# Patient Record
Sex: MALE | Race: WHITE | NOT HISPANIC OR LATINO | URBAN - METROPOLITAN AREA
[De-identification: names, ages, dates, MRNs, and addresses within clinical notes are randomized per-mention and may not be internally consistent; named-entity substitution may affect disease eponyms.]

---

## 2019-07-02 ENCOUNTER — OUTPATIENT (OUTPATIENT)
Dept: OUTPATIENT SERVICES | Facility: HOSPITAL | Age: 54
LOS: 1 days | Discharge: HOME | End: 2019-07-02

## 2019-07-02 DIAGNOSIS — E11.9 TYPE 2 DIABETES MELLITUS WITHOUT COMPLICATIONS: ICD-10-CM

## 2019-07-02 DIAGNOSIS — D64.9 ANEMIA, UNSPECIFIED: ICD-10-CM

## 2019-07-02 DIAGNOSIS — E78.00 PURE HYPERCHOLESTEROLEMIA, UNSPECIFIED: ICD-10-CM

## 2019-07-02 DIAGNOSIS — E03.9 HYPOTHYROIDISM, UNSPECIFIED: ICD-10-CM

## 2019-09-07 ENCOUNTER — EMERGENCY (EMERGENCY)
Facility: HOSPITAL | Age: 54
LOS: 0 days | Discharge: HOME | End: 2019-09-08
Attending: EMERGENCY MEDICINE | Admitting: EMERGENCY MEDICINE
Payer: MEDICARE

## 2019-09-07 VITALS
SYSTOLIC BLOOD PRESSURE: 138 MMHG | DIASTOLIC BLOOD PRESSURE: 81 MMHG | RESPIRATION RATE: 20 BRPM | OXYGEN SATURATION: 96 % | TEMPERATURE: 98 F | HEART RATE: 87 BPM

## 2019-09-07 DIAGNOSIS — M25.551 PAIN IN RIGHT HIP: ICD-10-CM
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
## 2019-09-07 DIAGNOSIS — K82.9 DISEASE OF GALLBLADDER, UNSPECIFIED: Chronic | ICD-10-CM

## 2019-09-07 DIAGNOSIS — Z79.899 OTHER LONG TERM (CURRENT) DRUG THERAPY: ICD-10-CM
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
## 2019-09-07 DIAGNOSIS — J36 PERITONSILLAR ABSCESS: Chronic | ICD-10-CM
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
## 2019-09-07 DIAGNOSIS — M25.511 PAIN IN RIGHT SHOULDER: ICD-10-CM

## 2019-09-07 DIAGNOSIS — M54.9 DORSALGIA, UNSPECIFIED: ICD-10-CM

## 2019-09-07 DIAGNOSIS — M25.552 PAIN IN LEFT HIP: ICD-10-CM

## 2019-09-07 PROCEDURE — 99284 EMERGENCY DEPT VISIT MOD MDM: CPT

## 2019-09-07 RX ORDER — MORPHINE SULFATE 50 MG/1
6 CAPSULE, EXTENDED RELEASE ORAL ONCE
Refills: 0 | Status: DISCONTINUED | OUTPATIENT
Start: 2019-09-07 | End: 2019-09-07

## 2019-09-07 RX ORDER — ROSUVASTATIN CALCIUM 5 MG/1
150 TABLET ORAL
Qty: 0 | Refills: 0 | DISCHARGE

## 2019-09-07 NOTE — ED ADULT TRIAGE NOTE - CHIEF COMPLAINT QUOTE
pt BIBA s/p fall off chair, c/o back pain, unable to ambulate. + head trauma, denies LOC, no abrasion noted. Pt has multiple hx of back surgeries and uses morphine pump for pain. Pt denies numbness or tingling.

## 2019-09-07 NOTE — ED ADULT NURSE NOTE - PMH
Hypertension, unspecified type    Hypogammaglobulinemia    Moderate mixed hyperlipidemia not requiring statin therapy    Spinal stenosis, unspecified spinal region    ST elevation myocardial infarction (STEMI), unspecified artery

## 2019-09-08 VITALS
TEMPERATURE: 98 F | DIASTOLIC BLOOD PRESSURE: 53 MMHG | RESPIRATION RATE: 20 BRPM | HEART RATE: 69 BPM | SYSTOLIC BLOOD PRESSURE: 110 MMHG

## 2019-09-08 PROCEDURE — 71045 X-RAY EXAM CHEST 1 VIEW: CPT | Mod: 26

## 2019-09-08 PROCEDURE — 73030 X-RAY EXAM OF SHOULDER: CPT | Mod: 26,RT

## 2019-09-08 PROCEDURE — 70450 CT HEAD/BRAIN W/O DYE: CPT | Mod: 26

## 2019-09-08 PROCEDURE — 73502 X-RAY EXAM HIP UNI 2-3 VIEWS: CPT | Mod: 26,RT

## 2019-09-08 PROCEDURE — 72170 X-RAY EXAM OF PELVIS: CPT | Mod: 26,59

## 2019-09-08 RX ORDER — MORPHINE SULFATE 50 MG/1
6 CAPSULE, EXTENDED RELEASE ORAL ONCE
Refills: 0 | Status: DISCONTINUED | OUTPATIENT
Start: 2019-09-08 | End: 2019-09-08

## 2019-09-08 RX ADMIN — MORPHINE SULFATE 6 MILLIGRAM(S): 50 CAPSULE, EXTENDED RELEASE ORAL at 01:53

## 2019-09-08 RX ADMIN — MORPHINE SULFATE 6 MILLIGRAM(S): 50 CAPSULE, EXTENDED RELEASE ORAL at 00:56

## 2019-09-08 RX ADMIN — MORPHINE SULFATE 6 MILLIGRAM(S): 50 CAPSULE, EXTENDED RELEASE ORAL at 02:30

## 2019-09-08 RX ADMIN — MORPHINE SULFATE 6 MILLIGRAM(S): 50 CAPSULE, EXTENDED RELEASE ORAL at 02:09

## 2019-09-08 NOTE — ED PROVIDER NOTE - OBJECTIVE STATEMENT
fall from seated height, fell on to right side, then chair fell on to his left side, he has morphine pump for chronic back pain his pain is right shoulder and bilateral hips. no loc, he did hit head.

## 2019-09-08 NOTE — ED PROVIDER NOTE - PATIENT PORTAL LINK FT
You can access the FollowMyHealth Patient Portal offered by [facility] by registering at the following website: [email or web address]. By joining [employer]’s FollowMyHealth portal, you will also be able to view your health information using other applications (apps) compatible with our system.

## 2019-09-08 NOTE — ED PROVIDER NOTE - NS ED ROS FT
Constitutional:  no fevers, no chills, no malaise  Eyes:  No visual changes  ENMT: No neck pain or stiffness, no nasal congestion, no ear pain, no throat pain  Cardiac:  No chest pain  Respiratory:  No cough or sob  GI:  No nausea, vomiting, diarrhea or abdominal pain.  :  No dysuria, frequency or burning.  MS: + joint pain  Neuro:  No headache, no dizziness, no change in mental status  Skin:  No skin rash  Except as documented in the HPI,  all other systems are negative

## 2020-08-30 ENCOUNTER — INPATIENT (INPATIENT)
Facility: HOSPITAL | Age: 55
LOS: 2 days | Discharge: HOME | DRG: 41 | End: 2020-09-02
Attending: HOSPITALIST | Admitting: HOSPITALIST
Payer: MEDICARE

## 2020-08-30 VITALS
DIASTOLIC BLOOD PRESSURE: 76 MMHG | HEART RATE: 96 BPM | HEIGHT: 70 IN | SYSTOLIC BLOOD PRESSURE: 146 MMHG | OXYGEN SATURATION: 95 % | RESPIRATION RATE: 18 BRPM | WEIGHT: 240.08 LBS | TEMPERATURE: 99 F

## 2020-08-30 DIAGNOSIS — D64.9 ANEMIA, UNSPECIFIED: ICD-10-CM

## 2020-08-30 DIAGNOSIS — K82.9 DISEASE OF GALLBLADDER, UNSPECIFIED: Chronic | ICD-10-CM

## 2020-08-30 DIAGNOSIS — D80.1 NONFAMILIAL HYPOGAMMAGLOBULINEMIA: ICD-10-CM

## 2020-08-30 DIAGNOSIS — Z45.42 ENCOUNTER FOR ADJUSTMENT AND MANAGEMENT OF NEUROSTIMULATOR: ICD-10-CM

## 2020-08-30 DIAGNOSIS — E78.00 PURE HYPERCHOLESTEROLEMIA, UNSPECIFIED: ICD-10-CM

## 2020-08-30 DIAGNOSIS — E78.5 HYPERLIPIDEMIA, UNSPECIFIED: ICD-10-CM

## 2020-08-30 DIAGNOSIS — I25.10 ATHEROSCLEROTIC HEART DISEASE OF NATIVE CORONARY ARTERY WITHOUT ANGINA PECTORIS: ICD-10-CM

## 2020-08-30 DIAGNOSIS — Z98.1 ARTHRODESIS STATUS: Chronic | ICD-10-CM

## 2020-08-30 DIAGNOSIS — M54.5 LOW BACK PAIN: ICD-10-CM

## 2020-08-30 DIAGNOSIS — Z79.84 LONG TERM (CURRENT) USE OF ORAL HYPOGLYCEMIC DRUGS: ICD-10-CM

## 2020-08-30 DIAGNOSIS — J30.2 OTHER SEASONAL ALLERGIC RHINITIS: ICD-10-CM

## 2020-08-30 DIAGNOSIS — K21.9 GASTRO-ESOPHAGEAL REFLUX DISEASE WITHOUT ESOPHAGITIS: ICD-10-CM

## 2020-08-30 DIAGNOSIS — R07.9 CHEST PAIN, UNSPECIFIED: ICD-10-CM

## 2020-08-30 DIAGNOSIS — K50.90 CROHN'S DISEASE, UNSPECIFIED, WITHOUT COMPLICATIONS: ICD-10-CM
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
## 2020-08-30 DIAGNOSIS — E11.9 TYPE 2 DIABETES MELLITUS WITHOUT COMPLICATIONS: ICD-10-CM

## 2020-08-30 DIAGNOSIS — R10.9 UNSPECIFIED ABDOMINAL PAIN: ICD-10-CM

## 2020-08-30 DIAGNOSIS — I10 ESSENTIAL (PRIMARY) HYPERTENSION: ICD-10-CM

## 2020-08-30 DIAGNOSIS — C85.90 NON-HODGKIN LYMPHOMA, UNSPECIFIED, UNSPECIFIED SITE: ICD-10-CM

## 2020-08-30 DIAGNOSIS — I25.2 OLD MYOCARDIAL INFARCTION: ICD-10-CM

## 2020-08-30 DIAGNOSIS — J36 PERITONSILLAR ABSCESS: Chronic | ICD-10-CM

## 2020-08-30 DIAGNOSIS — M54.9 DORSALGIA, UNSPECIFIED: ICD-10-CM

## 2020-08-30 DIAGNOSIS — Z79.82 LONG TERM (CURRENT) USE OF ASPIRIN: ICD-10-CM

## 2020-08-30 DIAGNOSIS — M54.16 RADICULOPATHY, LUMBAR REGION: ICD-10-CM

## 2020-08-30 DIAGNOSIS — Z29.9 ENCOUNTER FOR PROPHYLACTIC MEASURES, UNSPECIFIED: ICD-10-CM

## 2020-08-30 DIAGNOSIS — G62.9 POLYNEUROPATHY, UNSPECIFIED: ICD-10-CM

## 2020-08-30 LAB
ALBUMIN SERPL ELPH-MCNC: 3.4 G/DL — SIGNIFICANT CHANGE UP (ref 3.3–5)
ALP SERPL-CCNC: 88 U/L — SIGNIFICANT CHANGE UP (ref 40–120)
ALT FLD-CCNC: 25 U/L — SIGNIFICANT CHANGE UP (ref 10–45)
ANION GAP SERPL CALC-SCNC: 12 MMOL/L — SIGNIFICANT CHANGE UP (ref 5–17)
APTT BLD: 31.2 SEC — SIGNIFICANT CHANGE UP (ref 27.5–35.5)
AST SERPL-CCNC: 53 U/L — HIGH (ref 10–40)
BASOPHILS # BLD AUTO: 0.06 K/UL — SIGNIFICANT CHANGE UP (ref 0–0.2)
BASOPHILS NFR BLD AUTO: 0.7 % — SIGNIFICANT CHANGE UP (ref 0–2)
BILIRUB SERPL-MCNC: 0.4 MG/DL — SIGNIFICANT CHANGE UP (ref 0.2–1.2)
BLD GP AB SCN SERPL QL: NEGATIVE — SIGNIFICANT CHANGE UP
BUN SERPL-MCNC: 9 MG/DL — SIGNIFICANT CHANGE UP (ref 7–23)
CALCIUM SERPL-MCNC: 9 MG/DL — SIGNIFICANT CHANGE UP (ref 8.4–10.5)
CHLORIDE SERPL-SCNC: 98 MMOL/L — SIGNIFICANT CHANGE UP (ref 96–108)
CO2 SERPL-SCNC: 29 MMOL/L — SIGNIFICANT CHANGE UP (ref 22–31)
CREAT SERPL-MCNC: 0.68 MG/DL — SIGNIFICANT CHANGE UP (ref 0.5–1.3)
EOSINOPHIL # BLD AUTO: 0.46 K/UL — SIGNIFICANT CHANGE UP (ref 0–0.5)
EOSINOPHIL NFR BLD AUTO: 5.1 % — SIGNIFICANT CHANGE UP (ref 0–6)
GLUCOSE BLDC GLUCOMTR-MCNC: 104 MG/DL — HIGH (ref 70–99)
GLUCOSE BLDC GLUCOMTR-MCNC: 123 MG/DL — HIGH (ref 70–99)
GLUCOSE BLDC GLUCOMTR-MCNC: 150 MG/DL — HIGH (ref 70–99)
GLUCOSE SERPL-MCNC: 122 MG/DL — HIGH (ref 70–99)
HCT VFR BLD CALC: 36.9 % — LOW (ref 39–50)
HGB BLD-MCNC: 12.1 G/DL — LOW (ref 13–17)
IMM GRANULOCYTES NFR BLD AUTO: 0.3 % — SIGNIFICANT CHANGE UP (ref 0–1.5)
INR BLD: 1.17 — HIGH (ref 0.88–1.16)
LYMPHOCYTES # BLD AUTO: 2.68 K/UL — SIGNIFICANT CHANGE UP (ref 1–3.3)
LYMPHOCYTES # BLD AUTO: 29.7 % — SIGNIFICANT CHANGE UP (ref 13–44)
MCHC RBC-ENTMCNC: 28.1 PG — SIGNIFICANT CHANGE UP (ref 27–34)
MCHC RBC-ENTMCNC: 32.8 GM/DL — SIGNIFICANT CHANGE UP (ref 32–36)
MCV RBC AUTO: 85.6 FL — SIGNIFICANT CHANGE UP (ref 80–100)
MONOCYTES # BLD AUTO: 0.73 K/UL — SIGNIFICANT CHANGE UP (ref 0–0.9)
MONOCYTES NFR BLD AUTO: 8.1 % — SIGNIFICANT CHANGE UP (ref 2–14)
NEUTROPHILS # BLD AUTO: 5.05 K/UL — SIGNIFICANT CHANGE UP (ref 1.8–7.4)
NEUTROPHILS NFR BLD AUTO: 56.1 % — SIGNIFICANT CHANGE UP (ref 43–77)
NRBC # BLD: 0 /100 WBCS — SIGNIFICANT CHANGE UP (ref 0–0)
PLATELET # BLD AUTO: 149 K/UL — LOW (ref 150–400)
POTASSIUM SERPL-MCNC: 3.6 MMOL/L — SIGNIFICANT CHANGE UP (ref 3.5–5.3)
POTASSIUM SERPL-SCNC: 3.6 MMOL/L — SIGNIFICANT CHANGE UP (ref 3.5–5.3)
PROT SERPL-MCNC: 6.4 G/DL — SIGNIFICANT CHANGE UP (ref 6–8.3)
PROTHROM AB SERPL-ACNC: 13.9 SEC — HIGH (ref 10.6–13.6)
RBC # BLD: 4.31 M/UL — SIGNIFICANT CHANGE UP (ref 4.2–5.8)
RBC # FLD: 13.6 % — SIGNIFICANT CHANGE UP (ref 10.3–14.5)
RH IG SCN BLD-IMP: NEGATIVE — SIGNIFICANT CHANGE UP
SARS-COV-2 RNA SPEC QL NAA+PROBE: SIGNIFICANT CHANGE UP
SODIUM SERPL-SCNC: 139 MMOL/L — SIGNIFICANT CHANGE UP (ref 135–145)
WBC # BLD: 9.01 K/UL — SIGNIFICANT CHANGE UP (ref 3.8–10.5)
WBC # FLD AUTO: 9.01 K/UL — SIGNIFICANT CHANGE UP (ref 3.8–10.5)

## 2020-08-30 PROCEDURE — 99285 EMERGENCY DEPT VISIT HI MDM: CPT

## 2020-08-30 PROCEDURE — 71045 X-RAY EXAM CHEST 1 VIEW: CPT | Mod: 26

## 2020-08-30 PROCEDURE — 93010 ELECTROCARDIOGRAM REPORT: CPT

## 2020-08-30 PROCEDURE — 99223 1ST HOSP IP/OBS HIGH 75: CPT | Mod: GC

## 2020-08-30 RX ORDER — HYDROMORPHONE HYDROCHLORIDE 2 MG/ML
2 INJECTION INTRAMUSCULAR; INTRAVENOUS; SUBCUTANEOUS ONCE
Refills: 0 | Status: DISCONTINUED | OUTPATIENT
Start: 2020-08-30 | End: 2020-08-30

## 2020-08-30 RX ORDER — DEXTROSE 50 % IN WATER 50 %
25 SYRINGE (ML) INTRAVENOUS ONCE
Refills: 0 | Status: DISCONTINUED | OUTPATIENT
Start: 2020-08-30 | End: 2020-09-01

## 2020-08-30 RX ORDER — DEXTROSE 50 % IN WATER 50 %
15 SYRINGE (ML) INTRAVENOUS ONCE
Refills: 0 | Status: DISCONTINUED | OUTPATIENT
Start: 2020-08-30 | End: 2020-09-01

## 2020-08-30 RX ORDER — ATORVASTATIN CALCIUM 80 MG/1
20 TABLET, FILM COATED ORAL AT BEDTIME
Refills: 0 | Status: DISCONTINUED | OUTPATIENT
Start: 2020-08-30 | End: 2020-09-01

## 2020-08-30 RX ORDER — PANTOPRAZOLE SODIUM 20 MG/1
40 TABLET, DELAYED RELEASE ORAL
Refills: 0 | Status: DISCONTINUED | OUTPATIENT
Start: 2020-08-30 | End: 2020-09-01

## 2020-08-30 RX ORDER — METOPROLOL TARTRATE 50 MG
50 TABLET ORAL
Refills: 0 | Status: DISCONTINUED | OUTPATIENT
Start: 2020-08-30 | End: 2020-09-01

## 2020-08-30 RX ORDER — SODIUM CHLORIDE 9 MG/ML
1000 INJECTION INTRAMUSCULAR; INTRAVENOUS; SUBCUTANEOUS
Refills: 0 | Status: DISCONTINUED | OUTPATIENT
Start: 2020-08-30 | End: 2020-08-31

## 2020-08-30 RX ORDER — HYDROMORPHONE HYDROCHLORIDE 2 MG/ML
4 INJECTION INTRAMUSCULAR; INTRAVENOUS; SUBCUTANEOUS EVERY 4 HOURS
Refills: 0 | Status: DISCONTINUED | OUTPATIENT
Start: 2020-08-30 | End: 2020-08-31

## 2020-08-30 RX ORDER — FENOFIBRATE,MICRONIZED 130 MG
145 CAPSULE ORAL DAILY
Refills: 0 | Status: DISCONTINUED | OUTPATIENT
Start: 2020-08-30 | End: 2020-09-01

## 2020-08-30 RX ORDER — LORATADINE 10 MG/1
10 TABLET ORAL DAILY
Refills: 0 | Status: DISCONTINUED | OUTPATIENT
Start: 2020-08-30 | End: 2020-09-01

## 2020-08-30 RX ORDER — DEXTROSE 50 % IN WATER 50 %
12.5 SYRINGE (ML) INTRAVENOUS ONCE
Refills: 0 | Status: DISCONTINUED | OUTPATIENT
Start: 2020-08-30 | End: 2020-09-01

## 2020-08-30 RX ORDER — CYCLOBENZAPRINE HYDROCHLORIDE 10 MG/1
10 TABLET, FILM COATED ORAL THREE TIMES A DAY
Refills: 0 | Status: DISCONTINUED | OUTPATIENT
Start: 2020-08-30 | End: 2020-09-01

## 2020-08-30 RX ORDER — POLYETHYLENE GLYCOL 3350 17 G/17G
17 POWDER, FOR SOLUTION ORAL ONCE
Refills: 0 | Status: COMPLETED | OUTPATIENT
Start: 2020-08-30 | End: 2020-08-30

## 2020-08-30 RX ORDER — HYDROMORPHONE HYDROCHLORIDE 2 MG/ML
4 INJECTION INTRAMUSCULAR; INTRAVENOUS; SUBCUTANEOUS EVERY 4 HOURS
Refills: 0 | Status: DISCONTINUED | OUTPATIENT
Start: 2020-08-30 | End: 2020-08-30

## 2020-08-30 RX ORDER — GLUCAGON INJECTION, SOLUTION 0.5 MG/.1ML
1 INJECTION, SOLUTION SUBCUTANEOUS ONCE
Refills: 0 | Status: DISCONTINUED | OUTPATIENT
Start: 2020-08-30 | End: 2020-09-01

## 2020-08-30 RX ORDER — ASPIRIN/CALCIUM CARB/MAGNESIUM 324 MG
81 TABLET ORAL DAILY
Refills: 0 | Status: DISCONTINUED | OUTPATIENT
Start: 2020-08-30 | End: 2020-09-01

## 2020-08-30 RX ORDER — SODIUM CHLORIDE 9 MG/ML
1000 INJECTION, SOLUTION INTRAVENOUS
Refills: 0 | Status: DISCONTINUED | OUTPATIENT
Start: 2020-08-30 | End: 2020-09-01

## 2020-08-30 RX ORDER — GABAPENTIN 400 MG/1
800 CAPSULE ORAL DAILY
Refills: 0 | Status: DISCONTINUED | OUTPATIENT
Start: 2020-08-30 | End: 2020-08-31

## 2020-08-30 RX ORDER — POTASSIUM CHLORIDE 20 MEQ
40 PACKET (EA) ORAL ONCE
Refills: 0 | Status: COMPLETED | OUTPATIENT
Start: 2020-08-30 | End: 2020-08-30

## 2020-08-30 RX ORDER — INSULIN LISPRO 100/ML
VIAL (ML) SUBCUTANEOUS
Refills: 0 | Status: DISCONTINUED | OUTPATIENT
Start: 2020-08-30 | End: 2020-09-01

## 2020-08-30 RX ADMIN — CYCLOBENZAPRINE HYDROCHLORIDE 10 MILLIGRAM(S): 10 TABLET, FILM COATED ORAL at 20:33

## 2020-08-30 RX ADMIN — HYDROMORPHONE HYDROCHLORIDE 4 MILLIGRAM(S): 2 INJECTION INTRAMUSCULAR; INTRAVENOUS; SUBCUTANEOUS at 14:45

## 2020-08-30 RX ADMIN — GABAPENTIN 800 MILLIGRAM(S): 400 CAPSULE ORAL at 17:04

## 2020-08-30 RX ADMIN — HYDROMORPHONE HYDROCHLORIDE 2 MILLIGRAM(S): 2 INJECTION INTRAMUSCULAR; INTRAVENOUS; SUBCUTANEOUS at 09:39

## 2020-08-30 RX ADMIN — HYDROMORPHONE HYDROCHLORIDE 4 MILLIGRAM(S): 2 INJECTION INTRAMUSCULAR; INTRAVENOUS; SUBCUTANEOUS at 22:05

## 2020-08-30 RX ADMIN — HYDROMORPHONE HYDROCHLORIDE 4 MILLIGRAM(S): 2 INJECTION INTRAMUSCULAR; INTRAVENOUS; SUBCUTANEOUS at 18:05

## 2020-08-30 RX ADMIN — HYDROMORPHONE HYDROCHLORIDE 4 MILLIGRAM(S): 2 INJECTION INTRAMUSCULAR; INTRAVENOUS; SUBCUTANEOUS at 13:48

## 2020-08-30 RX ADMIN — HYDROMORPHONE HYDROCHLORIDE 4 MILLIGRAM(S): 2 INJECTION INTRAMUSCULAR; INTRAVENOUS; SUBCUTANEOUS at 19:05

## 2020-08-30 RX ADMIN — Medication 40 MILLIEQUIVALENT(S): at 12:11

## 2020-08-30 RX ADMIN — ATORVASTATIN CALCIUM 20 MILLIGRAM(S): 80 TABLET, FILM COATED ORAL at 21:46

## 2020-08-30 RX ADMIN — POLYETHYLENE GLYCOL 3350 17 GRAM(S): 17 POWDER, FOR SOLUTION ORAL at 19:34

## 2020-08-30 RX ADMIN — Medication 50 MILLIGRAM(S): at 17:04

## 2020-08-30 RX ADMIN — HYDROMORPHONE HYDROCHLORIDE 2 MILLIGRAM(S): 2 INJECTION INTRAMUSCULAR; INTRAVENOUS; SUBCUTANEOUS at 08:57

## 2020-08-30 NOTE — PROGRESS NOTE ADULT - SUBJECTIVE AND OBJECTIVE BOX
History of Present Illness:  History of Present Illness: 	  55 y.o. M PMHx HTN, HLD, DM, MI (2012), GERD, Crohn’s, non Hodgkins lymphoma, hypogammaglobulinemia , chronic back pain s/p nerve stimulator and pain pump with morphine presented to ED with worsening back pain 2/2 malfunctioning nerve stimulator. Patient realized stimulator stopped working night prior to admission and has been having break through pain. Pt also has pain pump with morphine that is still working. Called his pain management doctor (Dr. Gerardo [phone]) and was instructed to present to ED for evaluation.  Pain is in the mid to low back area and radiates to bilateral hips. Does not travel down his legs. Also has chronic numbness to bilateral lower extremity from neuropathy. History of cervical spine surgery and herniation in lumbar and thoracic spine. Denies new lower ext weakness or numbness. Uses cane at baseline. Denies any recent changes to bowel or bladder movements.  Endorses constipation, has not had BM since 5 days prior to admission but says he has chronic constipation. Endorses b/l LE edema. Also has diffuse abdominal pain 2/2 crohn's disease. However patient has not received treatment for Crohn's. Denies any headaches, fever, chills, n/v/d, dysuria.    Labs notable for Hgb/Hct 12.1/36/9, plt 149, PT 13.9, INR 1.17, AST 53  EKG: NSR    CXR clear            Review of Systems:  Review of Systems: As per HPI	      Allergies and Intolerances:        Allergies:  	No Known Allergies:     Home Medications:   * Patient Currently Takes Medications as of 30-Aug-2020 14:51 documented in Structured Notes  · 	Mucinex 600 mg oral tablet, extended release: Last Dose Taken:  , 1 tab(s) orally every 12 hours, As Needed  · 	gabapentin 800 mg oral tablet: Last Dose Taken:  , 1 tab(s) orally once a day  · 	metFORMIN 500 mg oral tablet: Last Dose Taken:  , 1 tab(s) orally 2 times a day  · 	metoprolol tartrate 50 mg oral tablet: Last Dose Taken:  , 1 tab(s) orally 2 times a day  · 	cyclobenzaprine 10 mg oral tablet: Last Dose Taken:  , 1 tab(s) orally 3 times a day  · 	esomeprazole 40 mg oral delayed release capsule: Last Dose Taken:  , 1 cap(s) orally once a day  · 	fenofibrate 134 mg oral capsule: Last Dose Taken:  , 1 cap(s) orally once a day  · 	Aspir 81 oral delayed release tablet: Last Dose Taken:  , 1 tab(s) orally once a day  · 	ZyrTEC 10 mg oral tablet: Last Dose Taken:  , 1 tab(s) orally once a day  · 	rosuvastatin 5 mg oral tablet: Last Dose Taken:  , 1 tab(s) orally once a day    Patient History:    Past Medical, Past Surgical, and Family History:  PAST MEDICAL HISTORY:  Chronic back pain     Crohn's disease     Diabetes     GERD (gastroesophageal reflux disease)     HTN (hypertension)     Hypercholesteremia     Myocardial infarct 2012    Seasonal allergies.     PAST SURGICAL HISTORY:  S/P cervical spinal fusion.     Social History:  Social History (marital status, living situation, occupation, tobacco use, alcohol and drug use, and sexual history): On disability due to back conditions. Former  for the [employer].	     Tobacco Screening:  · Core Measure Site	No	      Physical Exam:   Physical Exam: PHYSICAL EXAM:  GENERAL: NAD, speaks in full sentences, no signs of respiratory distress sitting in the bed, complaining of pain with discomfort in any position  HEAD:  Atraumatic, Normocephalic  EYES: EOMI, PERRLA, conjunctiva and sclera clear  NECK: Supple, No JVD  CHEST/LUNG: Clear to auscultation bilaterally; No wheeze; No crackles; No accessory muscles used  HEART: Regular rate and rhythm; No murmurs;   ABDOMEN: Soft, diffusely tender to palpation, distended due to body habitus; Bowel sounds present; No guarding  EXTREMITIES:  2+ Peripheral Pulses, 1+ edema; No cyanosis  PSYCH: AAOx3  NEUROLOGY: non-focal SKIN: No rashes or lesions  Labs reviwed as per H and P 	         Assessment:  · Assessment		  55 y.o. M PMHx HTN, HLD, DM, MI (2012), GERD, Crohn’s, non Hodgkins lymphoma, hypogammaglobulinemia , chronic back pain s/p nerve stimulator and pain pump with morphine presented to ED with worsening back pain 2/2 malfunctioning nerve stimulator.  DCS surgery for tomorrow afternoon  NPO after 6 AM  Patient may have clear liquids before 6 AM   IF fluids for tomorrow  AM   pain not controlled with oral meds   starts   Hydromorphone 4mg IV q4 prn for pain >7   continue other care   patient cleared for surgery  contact nurse in floor to call intern to place orders  any questions  please call or text   [phone]/ Ledy Aguillon)  (Signed 30-Aug-2020 14:51)  	Authored: History and Physical, History of Present Illness, Allergies/Medications, Patient History, Risk Assessment, Physical Exam, Labs and Results, Assessment and Plan

## 2020-08-30 NOTE — ED ADULT NURSE NOTE - OBJECTIVE STATEMENT
Pt states "I have a back nerve stimulator and a morphine pump, the back stimulator stopped working around 6 and since then the pain has not been tolerable and my pain management doctor told me to come to ER".

## 2020-08-30 NOTE — H&P ADULT - HISTORY OF PRESENT ILLNESS
55 y.o. PMHx HTN, HLD, DM, MI (2012), Crohn’s, chronic back pain s/p nerve stimulator and pain pump with morphine presents to ED secondary to nerve stimulator not working since night prior to this admission.  Patient realized stimulator stopped working last night and has been having break through pain.  Called his pain mgmt doctor Dr. Gerardo [phone] and was instructed to present to ED for evaluation.  As per patient, he has chronic numbness to b/l lower ext from neuropathy. History of cervical spine surgery and herniation in lumbar and thoracic spine. Denies new lower ext weakness. Uses cane at baseline. Endorses constipation, has not had BM for 5 days prior to admission. Endorses chronic constipation. Pt also has pain pump with morphine that is still working.    Denies any headaches, fever, chills, n/v/d, abdominal pain, dysuria, LE edema.     In the ED  Vitals T 98.2-98.8, HR 96->82, /76-> 102/65, RR 18, O2 sat 95-98 on RA  Labs notable for Hgb/Hct 12.1/36/9, plt 149, PT 13.9, INR 1.17, AST 53    EKG     CXR clear  Given dilaudid 2mg at 9am. 55 y.o. PMHx HTN, HLD, DM, MI (2012), Crohn’s, chronic back pain s/p nerve stimulator and pain pump with morphine presents to ED secondary to nerve stimulator not working since night prior to this admission.  Patient realized stimulator stopped working last night and has been having break through pain.  Called his pain mgmt doctor Dr. Gerardo [phone] and was instructed to present to ED for evaluation.  As per patient, he has chronic numbness to b/l lower ext from neuropathy. History of cervical spine surgery and herniation in lumbar and thoracic spine. Denies new lower ext weakness. Uses cane at baseline. Endorses constipation, has not had BM for 5 days prior to admission. Endorses chronic constipation. Pt also has pain pump with morphine that is still working. LE edema.     Denies any headaches, fever, chills, n/v/d, abdominal pain, dysuria.    In the ED  Vitals T 98.2-98.8, HR 96->82, /76-> 102/65, RR 18, O2 sat 95-98 on RA  Labs notable for Hgb/Hct 12.1/36/9, plt 149, PT 13.9, INR 1.17, AST 53    EKG     CXR clear  Given dilaudid 2mg at 9am. 55 y.o. PMHx HTN, HLD, DM, MI (2012), Crohn’s, chronic back pain s/p nerve stimulator and pain pump with morphine presents to ED secondary to nerve stimulator not working since night prior to this admission.  Patient realized stimulator stopped working last night and has been having break through pain.  Called his pain mgmt doctor Dr. Gerardo [phone] and was instructed to present to ED for evaluation.  As per patient, he has chronic numbness to b/l lower ext from neuropathy. History of cervical spine surgery and herniation in lumbar and thoracic spine. Denies new lower ext weakness. Uses cane at baseline. Endorses constipation, has not had BM for 5 days prior to admission. Endorses chronic constipation. Pt also has pain pump with morphine that is still working. LE edema.     Denies any headaches, fever, chills, n/v/d, abdominal pain, dysuria.    In the ED  Vitals T 98.2-98.8, HR 96->82, /76-> 102/65, RR 18, O2 sat 95-98 on RA  Labs notable for Hgb/Hct 12.1/36/9, plt 149, PT 13.9, INR 1.17, AST 53  EKG: NSR    CXR clear    Given dilaudid 2mg at 9am. 55 y.o. M PMHx HTN, HLD, DM, MI (2012), GERD, Crohn’s, non Hodgkins lymphoma, hypogammaglobulinemia , chronic back pain s/p nerve stimulator and pain pump with morphine presented to ED with worsening back pain 2/2 malfunctioning nerve stimulator. Patient realized stimulator stopped working night prior to admission and has been having break through pain. Pt also has pain pump with morphine that is still working. Called his pain management doctor (Dr. Gerardo [phone]) and was instructed to present to ED for evaluation.  Pain is in the mid to low back area and radiates to bilateral hips. Does not travel down his legs. Also has chronic numbness to bilateral lower extremity from neuropathy. History of cervical spine surgery and herniation in lumbar and thoracic spine. Denies new lower ext weakness or numbness. Uses cane at baseline. Denies any recent changes to bowel or bladder movements.  Endorses constipation, has not had BM since 5 days prior to admission but says he has chronic constipation. Endorses b/l LE edema. Also has diffuse abdominal pain 2/2 crohn's disease. However patient has not received treatment for Crohn's. Denies any headaches, fever, chills, n/v/d, dysuria.    In the ED  Vitals T 98.2-98.8, HR 96->82, /76-> 102/65, RR 18, O2 sat 95-98 on RA  Labs notable for Hgb/Hct 12.1/36/9, plt 149, PT 13.9, INR 1.17, AST 53  EKG: NSR    CXR clear    Given dilaudid 2mg at 9am.

## 2020-08-30 NOTE — ED ADULT NURSE NOTE - PMH
Chronic back pain    Diabetes    GERD (gastroesophageal reflux disease)    HTN (hypertension)    Hypercholesteremia    Seasonal allergies

## 2020-08-30 NOTE — H&P ADULT - NSHPPHYSICALEXAM_GEN_ALL_CORE
PHYSICAL EXAM:  GENERAL: NAD, speaks in full sentences, no signs of respiratory distress  HEAD:  Atraumatic, Normocephalic  EYES: EOMI, PERRLA, conjunctiva and sclera clear  NECK: Supple, No JVD  CHEST/LUNG: Clear to auscultation bilaterally; No wheeze; No crackles; No accessory muscles used  HEART: Regular rate and rhythm; No murmurs;   ABDOMEN: Soft, diffusely tender to palpation, distended due to body habitus; Bowel sounds present; No guarding  EXTREMITIES:  2+ Peripheral Pulses, No cyanosis or edema  PSYCH: AAOx3  NEUROLOGY: non-focal  SKIN: No rashes or lesions PHYSICAL EXAM:  GENERAL: NAD, speaks in full sentences, no signs of respiratory distress  HEAD:  Atraumatic, Normocephalic  EYES: EOMI, PERRLA, conjunctiva and sclera clear  NECK: Supple, No JVD  CHEST/LUNG: Clear to auscultation bilaterally; No wheeze; No crackles; No accessory muscles used  HEART: Regular rate and rhythm; No murmurs;   ABDOMEN: Soft, diffusely tender to palpation, distended due to body habitus; Bowel sounds present; No guarding  EXTREMITIES:  2+ Peripheral Pulses, 1+ edema; No cyanosis  PSYCH: AAOx3  NEUROLOGY: non-focal  SKIN: No rashes or lesions

## 2020-08-30 NOTE — ED ADULT NURSE NOTE - CHIEF COMPLAINT QUOTE
pt arriving to ED for c/c "my back nerve stimulator and morphine pump are broken, my pain management  told me to come for evaluation." Pt endorses back pain, radiating to Lt arm and jaw. Trouble ambulating this am. Denies fever, chills, cough, CP or SOB.

## 2020-08-30 NOTE — ED PROVIDER NOTE - OBJECTIVE STATEMENT
[facility] [address]    [address]    [facility] [address]    [facility] [address]    Phone:  [phone]    Fax:  [phone]       Thank You for choosing us for your health care visit. We are glad to serve you and happy to provide you with this summary of your visit. Please help us to ensure we have accurate records. If you find anything that needs to be changed, please let our staff know as soon as possible.          Your Demographic Information     Patient Name Sex David Durbin 1938       Ethnic Group Patient Race    Not of  or  Origin White      Your Visit Details     Date & Time Provider Department    2017 2:45 PM Mervin Lema III, MD [facility] [address]      Your Upcoming Appointment*(Max 10)     2017  2:50 PM CST   Follow-up Visit with MD Shyanne العلي  [facility] ([facility])    [address] [facility] [address]   [phone]            2017  4:15 PM CDT   Office Visit with Mervin Lema III, MD   [facility] [address] ([facility])    [address]  [facility] [address]  [facility] [address]   [phone]              Your To Do List     Future Orders Please Complete On or Around Expires    CBC NO DIFFERENTIAL  2017 (Approximate) Sep 14, 2017    PROSTATE SPECIFIC ANTIGEN  2017 (Approximate) Sep 14, 2017    TESTOSTERONE TOTAL-MALE  2017 (Approximate) Sep 14, 2017      Conditions Discussed Today or Order-Related Diagnoses        Comments    Hypogonadism male    -  Primary     Malignant neoplasm of prostate           Your Vitals Were     BP Height Weight BMI Smoking Status       112/76 5' 8\" (1.727 m) 220 lb (99.8 kg) 33.45 kg/m2 Former Smoker       Medications Prescribed or Re-Ordered Today     Testosterone (AXIRON) 30  MG/ACT Solution    Sig - Route: Place 1 pump onto the skin every morning. - Transdermal    Class: Normal    Pharmacy: [facility] [address] [place] [address] [facility] Ph #: [phone]      Your Current Medications Are        Disp Refills Start End    Testosterone (AXIRON) 30 MG/ACT Solution 90 mL 5 1/17/2017     Sig - Route: Place 1 pump onto the skin every morning. - Transdermal    metoPROLOL (TOPROL-XL) 25 MG 24 hr tablet 30 tablet 0 1/5/2017     Sig - Route: Take 1 tablet by mouth daily. - Oral    Class: Eprescribe    Notes to Pharmacy: Needs to be seen by PCP before more refills given. 1-5-17.    levothyroxine (SYNTHROID, LEVOTHROID) 100 MCG tablet 60 tablet 0 10/24/2016     Sig - Route: Take 1 tablet by mouth daily. - Oral    Class: Eprescribe    Notes to Pharmacy: Needs to be seen by PCP before more refills given. 10-24-16.    alendronate (FOSAMAX) 70 MG tablet 12 tablet 1 4/1/2016     Sig: TAKE 1 TABLET EVERY 7 DAYS    Class: Eprescribe    Cosign for Ordering: Accepted by Alejandro Calle MD on 4/1/2016  6:33 PM    alendronate (FOSAMAX) 70 MG tablet 12 tablet 3 3/7/2016     Sig: TAKE 1 TABLET EVERY 7 DAYS    Class: Eprescribe    Cosign for Ordering: Accepted by Alejandro Calle MD on 3/7/2016 11:13 AM    cholecalciferol (VITAMIN D3) 1000 UNITS tablet        Sig - Route: Take 2,000 Units by mouth daily.  - Oral    Class: Historical Med    sildenafil (VIAGRA) 100 MG tablet 10 tablet 0 7/17/2013     Sig - Route: Take 1 tablet by mouth as needed for Erectile Dysfunction. - Oral    Class: Eprescribe    aspirin  MG EC tablet        Sig - Route: Take 325 mg by mouth daily. - Oral    Class: Historical Med    Ascorbic Acid (VITAMIN C) 500 MG tablet        Sig - Route: Take 500 mg by mouth daily. - Oral    Class: Historical Med    fish oil-omega-3 fatty acids 1000 MG CAPS        Sig - Route: Take 1,200 mg by mouth daily. - Oral    Class: Historical Med     vitamin - therapeutic multivitamins w/minerals (CENTRUM SILVER,THERA-M) TABS        Sig - Route: Take 1 tablet by mouth daily. - Oral    Class: Historical Med      Allergies     No Known Allergies      Immunizations History as of 1/17/2017     Name Date    Influenza 9/25/2013, 9/30/2010, 10/1/2009, 10/16/2008, 10/19/2007      Problem List as of 1/17/2017     Hypothyroidism    Hypogonadism male    Palpitations    Sinus tachycardia    Fatigue    Neuropathy    Osteoporosis    Malignant neoplasm of prostate            Patient Instructions     None       54 y/o m with PMH of HTN, HLD, DM, chronic back pain s/p nerve stimulator and pain pump with morphine presents to ED secondary to nerve stimulator not working.  As per patient, he realized stimulator stopped working last night and has been having break through pain.  Called his pain mgmt doctor Dr. Gerardo [phone] and was instructed to present to ED for evaluation.  As per patient, he has chronic numbness to b/l lower ext from neuropathy.  Denies lower ext weakness.  No other infectious complaints.  Pt also has pain pump with morphine that is still working.

## 2020-08-30 NOTE — ED ADULT NURSE NOTE - CAS EDP DISCH DISPOSITION ADMI
Patient Instructions by Pema Bishop CMA at 05/30/18 10:20 AM     Author:  Pema Bishop CMA Service:  (none) Author Type:  Certified Medical Assistant     Filed:  05/30/18 10:20 AM Encounter Date:  5/30/2018 Status:  Signed     :  Pema Bishop CMA (Certified Medical Assistant)            PATIENT INFORMATION   -- Please go to X-ray now to have your test performed.     Follow-Up  -- Make an appointment with Chaya Nolen MD in 3 weeks.      -- You have been referred to:Physical Therapy --they should be calling you to schedule.  Physical Therapy Location     [facility]   [address] [facility]  [address] [address] [facility]  [address] [address] [facility] [address],  [place]  [address] [address] [facility]  [address] [address] [place]  [address]          [phone]            · Occupational Therapy (Hand/Upper extremity therapy) is offered at the [facility]  · Please dress according to treatment area (ex: knee treatment needs to wear shorts)  · Co-payments are due at the time of appointment  · To ensure your visit goes as smooth as possible, please check your insurance benefits for coverage of physical therapy and bring a copy of your insurance card with you.    . Please call if you have not heard from that department in 3 days.     Additional Educational Resources:  For additional resources regarding your symptoms, diagnosis, or further health information, please visit the Health Resources section on [email or web address] or the Online Health Resources section in [email or web address].            Revision History        User Key Date/Time User Provider Type Action    > [N/A] 05/30/18 10:20 AM Pema Bishop CMA Certified Medical Assistant Sign             [facility]

## 2020-08-30 NOTE — ED ADULT TRIAGE NOTE - CHIEF COMPLAINT QUOTE
pt arriving to ED for c/c "my back nerve stimulator and morphine pump are broken". My pain management  told me to come for evaluation. Pt endorses back pain, radiating to Lt arm and jaw. Denies fever, chills, cough, CP or SOB. pt arriving to ED for c/c "my back nerve stimulator and morphine pump are broken, my pain management  told me to come for evaluation." Pt endorses back pain, radiating to Lt arm and jaw. Trouble ambulating this am. Denies fever, chills, cough, CP or SOB.

## 2020-08-30 NOTE — H&P ADULT - PROBLEM SELECTOR PLAN 9
F: tolerating PO, no IVF  E: replete K<4, Mg<2  N: consistent carb    VTE Prophylaxis: Lovenox 40 Q24H  GI: not needed  C: Full Code  D: F Hgb on admission 12.1, currently no signs of active bleeding (no hematochezia, melena, hemoptysis, hematuria)  - trend CBC  - maintain active T&S  - transfuse if Hgb <7

## 2020-08-30 NOTE — H&P ADULT - ASSESSMENT
55 y.o. M PMHx HTN, HLD, DM, MI (2012), Crohn’s, chronic back pain s/p nerve stimulator and pain pump with morphine presents with worsening back pain 2/2 malfunctioning nerve stimulator. 55 y.o. M PMHx HTN, HLD, DM, MI (2012), GERD, Crohn’s, non Hodgkins lymphoma, hypogammaglobulinemia , chronic back pain s/p nerve stimulator and pain pump with morphine presented to ED with worsening back pain 2/2 malfunctioning nerve stimulator.

## 2020-08-30 NOTE — H&P ADULT - PROBLEM SELECTOR PLAN 1
Patient with history of chronic back pain, with cervical spine surgery and lumbar and thoracic disc herniations. Has nerve stimulator and morphine pain pump. Pump still working but nerve stimulator stopped working yesterday evening – was initially placed in 2011. His pain mgmt doctor (Dr. Gerardo [phone]) instructed patient to present to ED for evaluation. Patient to undergo procedure under general anesthesia to replace stimulator tomorrow.  -contact Dr. Gerardo [phone] (text preferred)  -pain management w/ dilaudid 4mg PO PRN for pain >6   -NPO at midnight for OR 8/31   -EKG NSR  -c/w cyclobenzaprine 10  -c/w gabapentin 800 Patient with history of chronic back pain, with cervical spine surgery and lumbar and thoracic disc herniations. Has nerve stimulator and morphine pain pump. Pump still working but nerve stimulator stopped working yesterday evening – was initially placed in 2011. His pain mgmt doctor (Dr. Gerardo [phone]) instructed patient to present to ED for evaluation. Patient to undergo procedure under general anesthesia to replace stimulator tomorrow.  -contact Dr. Gerardo [phone] (text preferred)  -pain management w/ dilaudid 4mg PO PRN for pain >6   -morphine pump still working  -NPO at midnight for OR 8/31   -EKG NSR  -c/w cyclobenzaprine 10  -c/w gabapentin 800

## 2020-08-30 NOTE — ED PROVIDER NOTE - CLINICAL SUMMARY MEDICAL DECISION MAKING FREE TEXT BOX
Pt here because nerve stimulator battery  - sent by pain mgmt for OR replacement tomorrow.  Preop in ED.  Contacted Dr. Gerardo who states pt can have dilaudid po in addition to pain pump and will be admitted to medicine for pain control until OR tomorrow.

## 2020-08-30 NOTE — H&P ADULT - PROBLEM SELECTOR PLAN 8
Hgb on admission 12.1, currently no signs of active bleeding (no hematochezia, melena, hemoptysis, hematuria)  - trend CBC  - maintain active T&S  - transfuse if Hgb <7 Patient with diffuse constant chronic abdominal pain he says is secondary to untreated Crohn's. TTP diffusely on exam but with no rebound or guarding.  -continue to monitor  -has morphine pump and Dilaudid PRN for pain  -outpatient follow up

## 2020-08-30 NOTE — H&P ADULT - PROBLEM SELECTOR PLAN 7
F: tolerating PO, no IVF  E: replete K<4, Mg<2  N: Dash/TLC    VTE Prophylaxis: Lovenox 40 Q24H  GI: not needed  C: Full Code  D: c/w home metoprolol

## 2020-08-30 NOTE — H&P ADULT - PROBLEM SELECTOR PLAN 2
Hx of MI in 2012. EKG in ED today wnl.   -c/w asa 81  -c/w metroprolol 50 BID Hx of MI in 2012. No stents. EKG in ED today wnl.   -c/w asa 81  -c/w metroprolol 50 BID

## 2020-08-30 NOTE — H&P ADULT - PROBLEM SELECTOR PLAN 10
F: tolerating PO, no IVF  E: replete K<4, Mg<2  N: consistent carb    VTE Prophylaxis: Lovenox 40 Q24H  GI: not needed  C: Full Code  D: F F: tolerating PO, no IVF  E: replete K<4, Mg<2  N: consistent carb    VTE Prophylaxis: none, start after OR tomorrow  GI: not needed  C: Full Code  D: F

## 2020-08-30 NOTE — H&P ADULT - ATTENDING COMMENTS
55M w/ CAD (hx of MI) and chronic pain presents for nerve stimulator replacement    Nerve stimulator malfunction: OR tomorrow, NPO at midnight.  METS >4 w/ good functions cardio/resp status.  Limitations are due to pain, patient lives in 3 floor walkup, when pain controlled, he has no problem w/ 3 flights.  RCRI Class II risk (hx of CAD w/ MI), on appropriate therapy.  EKG shows  NSR w/out ischemia (q-wave in 3).  C/w b-blocker.  Hold ASA per surgeon.  Low-intermediate risk for low risk procedure.  Patient is medically optimized.    CAD: C/w b-blocker, ASA, statin.    Chronic pain: f/u pain management recs.    Dispo: Home

## 2020-08-30 NOTE — ED ADULT NURSE NOTE - NSIMPLEMENTINTERV_GEN_ALL_ED
Implemented All Fall with Harm Risk Interventions:  [place] to call system. Call bell, personal items and telephone within reach. Instruct patient to call for assistance. Room bathroom lighting operational. Non-slip footwear when patient is off stretcher. Physically safe environment: no spills, clutter or unnecessary equipment. Stretcher in lowest position, wheels locked, appropriate side rails in place. Provide visual cue, wrist band, yellow gown, etc. Monitor gait and stability. Monitor for mental status changes and reorient to person, place, and time. Review medications for side effects contributing to fall risk. Reinforce activity limits and safety measures with patient and family. Provide visual clues: red socks.

## 2020-08-30 NOTE — H&P ADULT - NSHPLABSRESULTS_GEN_ALL_CORE
LABS:                        12.1   9.01  )-----------( 149      ( 30 Aug 2020 08:58 )             36.9     08-30    139  |  98  |  9   ----------------------------<  122<H>  3.6   |  29  |  0.68    Ca    9.0      30 Aug 2020 08:58    TPro  6.4  /  Alb  3.4  /  TBili  0.4  /  DBili  x   /  AST  53<H>  /  ALT  25  /  AlkPhos  88  08-30      PT/INR - ( 30 Aug 2020 08:58 )   PT: 13.9 sec;   INR: 1.17          PTT - ( 30 Aug 2020 08:58 )  PTT:31.2 sec  CAPILLARY BLOOD GLUCOSE                        LIVER FUNCTIONS - ( 30 Aug 2020 08:58 )  Alb: 3.4 g/dL / Pro: 6.4 g/dL / ALK PHOS: 88 U/L / ALT: 25 U/L / AST: 53 U/L / GGT: x                     I & O Summary:      Microbiology:        RADIOLOGY, EKG AND ADDITIONAL TESTS: Reviewed.

## 2020-08-30 NOTE — H&P ADULT - NSICDXPASTMEDICALHX_GEN_ALL_CORE_FT
PAST MEDICAL HISTORY:  Chronic back pain     Crohn's disease     Diabetes     GERD (gastroesophageal reflux disease)     HTN (hypertension)     Hypercholesteremia     Myocardial infarct 2012    Seasonal allergies

## 2020-08-31 ENCOUNTER — TRANSCRIPTION ENCOUNTER (OUTPATIENT)
Age: 55
End: 2020-08-31

## 2020-08-31 PROBLEM — E78.2 MIXED HYPERLIPIDEMIA: Chronic | Status: ACTIVE | Noted: 2019-09-07

## 2020-08-31 PROBLEM — M48.00 SPINAL STENOSIS, SITE UNSPECIFIED: Chronic | Status: ACTIVE | Noted: 2019-09-07

## 2020-08-31 PROBLEM — I21.3 ST ELEVATION (STEMI) MYOCARDIAL INFARCTION OF UNSPECIFIED SITE: Chronic | Status: ACTIVE | Noted: 2019-09-07

## 2020-08-31 PROBLEM — I10 ESSENTIAL (PRIMARY) HYPERTENSION: Chronic | Status: ACTIVE | Noted: 2019-09-07

## 2020-08-31 PROBLEM — D80.1 NONFAMILIAL HYPOGAMMAGLOBULINEMIA: Chronic | Status: ACTIVE | Noted: 2019-09-07

## 2020-08-31 LAB
A1C WITH ESTIMATED AVERAGE GLUCOSE RESULT: 6.3 % — HIGH (ref 4–5.6)
ALBUMIN SERPL ELPH-MCNC: 3.3 G/DL — SIGNIFICANT CHANGE UP (ref 3.3–5)
ALP SERPL-CCNC: 73 U/L — SIGNIFICANT CHANGE UP (ref 40–120)
ALT FLD-CCNC: 21 U/L — SIGNIFICANT CHANGE UP (ref 10–45)
ANION GAP SERPL CALC-SCNC: 7 MMOL/L — SIGNIFICANT CHANGE UP (ref 5–17)
APTT BLD: 34.9 SEC — SIGNIFICANT CHANGE UP (ref 27.5–35.5)
AST SERPL-CCNC: 41 U/L — HIGH (ref 10–40)
BASOPHILS # BLD AUTO: 0.05 K/UL — SIGNIFICANT CHANGE UP (ref 0–0.2)
BASOPHILS NFR BLD AUTO: 0.7 % — SIGNIFICANT CHANGE UP (ref 0–2)
BILIRUB SERPL-MCNC: 0.4 MG/DL — SIGNIFICANT CHANGE UP (ref 0.2–1.2)
BUN SERPL-MCNC: 8 MG/DL — SIGNIFICANT CHANGE UP (ref 7–23)
CALCIUM SERPL-MCNC: 8.6 MG/DL — SIGNIFICANT CHANGE UP (ref 8.4–10.5)
CHLORIDE SERPL-SCNC: 102 MMOL/L — SIGNIFICANT CHANGE UP (ref 96–108)
CO2 SERPL-SCNC: 30 MMOL/L — SIGNIFICANT CHANGE UP (ref 22–31)
CREAT SERPL-MCNC: 0.67 MG/DL — SIGNIFICANT CHANGE UP (ref 0.5–1.3)
EOSINOPHIL # BLD AUTO: 0.37 K/UL — SIGNIFICANT CHANGE UP (ref 0–0.5)
EOSINOPHIL NFR BLD AUTO: 5.1 % — SIGNIFICANT CHANGE UP (ref 0–6)
ESTIMATED AVERAGE GLUCOSE: 134 MG/DL — HIGH (ref 68–114)
GLUCOSE BLDC GLUCOMTR-MCNC: 103 MG/DL — HIGH (ref 70–99)
GLUCOSE BLDC GLUCOMTR-MCNC: 128 MG/DL — HIGH (ref 70–99)
GLUCOSE BLDC GLUCOMTR-MCNC: 137 MG/DL — HIGH (ref 70–99)
GLUCOSE BLDC GLUCOMTR-MCNC: 145 MG/DL — HIGH (ref 70–99)
GLUCOSE SERPL-MCNC: 113 MG/DL — HIGH (ref 70–99)
HCT VFR BLD CALC: 36.6 % — LOW (ref 39–50)
HGB BLD-MCNC: 11.9 G/DL — LOW (ref 13–17)
IMM GRANULOCYTES NFR BLD AUTO: 0.4 % — SIGNIFICANT CHANGE UP (ref 0–1.5)
INR BLD: 1.17 — HIGH (ref 0.88–1.16)
LYMPHOCYTES # BLD AUTO: 2.4 K/UL — SIGNIFICANT CHANGE UP (ref 1–3.3)
LYMPHOCYTES # BLD AUTO: 32.8 % — SIGNIFICANT CHANGE UP (ref 13–44)
MAGNESIUM SERPL-MCNC: 1.4 MG/DL — LOW (ref 1.6–2.6)
MCHC RBC-ENTMCNC: 27.7 PG — SIGNIFICANT CHANGE UP (ref 27–34)
MCHC RBC-ENTMCNC: 32.5 GM/DL — SIGNIFICANT CHANGE UP (ref 32–36)
MCV RBC AUTO: 85.1 FL — SIGNIFICANT CHANGE UP (ref 80–100)
MONOCYTES # BLD AUTO: 0.56 K/UL — SIGNIFICANT CHANGE UP (ref 0–0.9)
MONOCYTES NFR BLD AUTO: 7.7 % — SIGNIFICANT CHANGE UP (ref 2–14)
NEUTROPHILS # BLD AUTO: 3.9 K/UL — SIGNIFICANT CHANGE UP (ref 1.8–7.4)
NEUTROPHILS NFR BLD AUTO: 53.3 % — SIGNIFICANT CHANGE UP (ref 43–77)
NRBC # BLD: 0 /100 WBCS — SIGNIFICANT CHANGE UP (ref 0–0)
PHOSPHATE SERPL-MCNC: 2.3 MG/DL — LOW (ref 2.5–4.5)
PLATELET # BLD AUTO: 143 K/UL — LOW (ref 150–400)
POTASSIUM SERPL-MCNC: 4 MMOL/L — SIGNIFICANT CHANGE UP (ref 3.5–5.3)
POTASSIUM SERPL-SCNC: 4 MMOL/L — SIGNIFICANT CHANGE UP (ref 3.5–5.3)
PROT SERPL-MCNC: 6 G/DL — SIGNIFICANT CHANGE UP (ref 6–8.3)
PROTHROM AB SERPL-ACNC: 13.9 SEC — HIGH (ref 10.6–13.6)
RBC # BLD: 4.3 M/UL — SIGNIFICANT CHANGE UP (ref 4.2–5.8)
RBC # FLD: 13.9 % — SIGNIFICANT CHANGE UP (ref 10.3–14.5)
SODIUM SERPL-SCNC: 139 MMOL/L — SIGNIFICANT CHANGE UP (ref 135–145)
TROPONIN T SERPL-MCNC: <0.01 NG/ML — SIGNIFICANT CHANGE UP (ref 0–0.01)
TROPONIN T SERPL-MCNC: <0.01 NG/ML — SIGNIFICANT CHANGE UP (ref 0–0.01)
WBC # BLD: 7.31 K/UL — SIGNIFICANT CHANGE UP (ref 3.8–10.5)
WBC # FLD AUTO: 7.31 K/UL — SIGNIFICANT CHANGE UP (ref 3.8–10.5)

## 2020-08-31 PROCEDURE — 99233 SBSQ HOSP IP/OBS HIGH 50: CPT | Mod: GC

## 2020-08-31 RX ORDER — ROSUVASTATIN CALCIUM 5 MG/1
5 TABLET ORAL
Qty: 0 | Refills: 0 | DISCHARGE

## 2020-08-31 RX ORDER — GABAPENTIN 400 MG/1
800 CAPSULE ORAL EVERY 12 HOURS
Refills: 0 | Status: DISCONTINUED | OUTPATIENT
Start: 2020-08-31 | End: 2020-09-01

## 2020-08-31 RX ORDER — HYDROMORPHONE HYDROCHLORIDE 2 MG/ML
4 INJECTION INTRAMUSCULAR; INTRAVENOUS; SUBCUTANEOUS EVERY 4 HOURS
Refills: 0 | Status: DISCONTINUED | OUTPATIENT
Start: 2020-08-31 | End: 2020-08-31

## 2020-08-31 RX ORDER — ROSUVASTATIN CALCIUM 5 MG/1
10 TABLET ORAL
Qty: 0 | Refills: 0 | DISCHARGE

## 2020-08-31 RX ORDER — ROSUVASTATIN CALCIUM 5 MG/1
50 TABLET ORAL
Qty: 0 | Refills: 0 | DISCHARGE

## 2020-08-31 RX ORDER — SODIUM CHLORIDE 9 MG/ML
1000 INJECTION INTRAMUSCULAR; INTRAVENOUS; SUBCUTANEOUS
Refills: 0 | Status: DISCONTINUED | OUTPATIENT
Start: 2020-08-31 | End: 2020-08-31

## 2020-08-31 RX ORDER — MAGNESIUM SULFATE 500 MG/ML
2 VIAL (ML) INJECTION ONCE
Refills: 0 | Status: COMPLETED | OUTPATIENT
Start: 2020-08-31 | End: 2020-08-31

## 2020-08-31 RX ORDER — HYDROMORPHONE HYDROCHLORIDE 2 MG/ML
4 INJECTION INTRAMUSCULAR; INTRAVENOUS; SUBCUTANEOUS EVERY 4 HOURS
Refills: 0 | Status: DISCONTINUED | OUTPATIENT
Start: 2020-08-31 | End: 2020-09-01

## 2020-08-31 RX ADMIN — Medication 81 MILLIGRAM(S): at 11:59

## 2020-08-31 RX ADMIN — HYDROMORPHONE HYDROCHLORIDE 4 MILLIGRAM(S): 2 INJECTION INTRAMUSCULAR; INTRAVENOUS; SUBCUTANEOUS at 07:04

## 2020-08-31 RX ADMIN — HYDROMORPHONE HYDROCHLORIDE 4 MILLIGRAM(S): 2 INJECTION INTRAMUSCULAR; INTRAVENOUS; SUBCUTANEOUS at 23:43

## 2020-08-31 RX ADMIN — Medication 50 MILLIGRAM(S): at 06:48

## 2020-08-31 RX ADMIN — SODIUM CHLORIDE 115 MILLILITER(S): 9 INJECTION INTRAMUSCULAR; INTRAVENOUS; SUBCUTANEOUS at 11:48

## 2020-08-31 RX ADMIN — ATORVASTATIN CALCIUM 20 MILLIGRAM(S): 80 TABLET, FILM COATED ORAL at 21:29

## 2020-08-31 RX ADMIN — SODIUM CHLORIDE 115 MILLILITER(S): 9 INJECTION INTRAMUSCULAR; INTRAVENOUS; SUBCUTANEOUS at 02:02

## 2020-08-31 RX ADMIN — HYDROMORPHONE HYDROCHLORIDE 4 MILLIGRAM(S): 2 INJECTION INTRAMUSCULAR; INTRAVENOUS; SUBCUTANEOUS at 19:26

## 2020-08-31 RX ADMIN — Medication 100 GRAM(S): at 11:23

## 2020-08-31 RX ADMIN — HYDROMORPHONE HYDROCHLORIDE 4 MILLIGRAM(S): 2 INJECTION INTRAMUSCULAR; INTRAVENOUS; SUBCUTANEOUS at 20:20

## 2020-08-31 RX ADMIN — LORATADINE 10 MILLIGRAM(S): 10 TABLET ORAL at 12:00

## 2020-08-31 RX ADMIN — PANTOPRAZOLE SODIUM 40 MILLIGRAM(S): 20 TABLET, DELAYED RELEASE ORAL at 06:50

## 2020-08-31 RX ADMIN — HYDROMORPHONE HYDROCHLORIDE 4 MILLIGRAM(S): 2 INJECTION INTRAMUSCULAR; INTRAVENOUS; SUBCUTANEOUS at 01:39

## 2020-08-31 RX ADMIN — HYDROMORPHONE HYDROCHLORIDE 4 MILLIGRAM(S): 2 INJECTION INTRAMUSCULAR; INTRAVENOUS; SUBCUTANEOUS at 07:06

## 2020-08-31 RX ADMIN — GABAPENTIN 800 MILLIGRAM(S): 400 CAPSULE ORAL at 11:59

## 2020-08-31 RX ADMIN — Medication 50 MILLIGRAM(S): at 18:45

## 2020-08-31 RX ADMIN — Medication 145 MILLIGRAM(S): at 11:59

## 2020-08-31 RX ADMIN — GABAPENTIN 800 MILLIGRAM(S): 400 CAPSULE ORAL at 21:29

## 2020-08-31 NOTE — PROGRESS NOTE ADULT - PROBLEM SELECTOR PLAN 2
Hx of MI in 2012. No stents. EKG today with slight t wave changes from prior. Trops negative for now will f/u repeat.  -c/w asa 81  -c/w metroprolol 50 BID

## 2020-08-31 NOTE — PROGRESS NOTE ADULT - SUBJECTIVE AND OBJECTIVE BOX
OVERNIGHT EVENTS: BAILEE     SUBJECTIVE / INTERVAL HPI: Patient seen and examined at bedside. C/o severe pain down spine and sacrum b/l. Also c/o muscle spasms in his neck and shoulders. Also reports he has not had BM in 6 days. This afternoon patient felt "like Denies N/V, CP, SOB. All other ROS otherwise negative.    VITAL SIGNS:  Vital Signs Last 24 Hrs  T(C): 37 (31 Aug 2020 13:19), Max: 37.1 (31 Aug 2020 05:23)  T(F): 98.6 (31 Aug 2020 13:19), Max: 98.7 (31 Aug 2020 05:23)  HR: 82 (31 Aug 2020 13:19) (74 - 82)  BP: 138/85 (31 Aug 2020 13:19) (113/65 - 138/85)  BP(mean): --  RR: 18 (31 Aug 2020 13:19) (17 - 18)  SpO2: 94% (31 Aug 2020 13:19) (93% - 95%)    08-30-20 @ 07:01  -  08-31-20 @ 07:00  --------------------------------------------------------  IN: 0 mL / OUT: 1650 mL / NET: -1650 mL        PHYSICAL EXAM:    General: WDWN  HEENT: NC/AT; PERRL, anicteric sclera; MMM  Neck: supple  Cardiovascular: +S1/S2; RRR  Respiratory: CTA B/L; no W/R/R  Gastrointestinal: soft, NT/ND; +BSx4  Extremities: WWP; no edema, clubbing or cyanosis  Vascular: 2+ radial, DP/PT pulses B/L  Neurological: AAOx3; no focal deficits    MEDICATIONS:  MEDICATIONS  (STANDING):  aspirin enteric coated 81 milliGRAM(s) Oral daily  atorvastatin 20 milliGRAM(s) Oral at bedtime  dextrose 5%. 1000 milliLiter(s) (50 mL/Hr) IV Continuous <Continuous>  dextrose 50% Injectable 12.5 Gram(s) IV Push once  dextrose 50% Injectable 25 Gram(s) IV Push once  dextrose 50% Injectable 25 Gram(s) IV Push once  fenofibrate Tablet 145 milliGRAM(s) Oral daily  gabapentin 800 milliGRAM(s) Oral every 12 hours  insulin lispro (HumaLOG) corrective regimen sliding scale   SubCutaneous Before meals and at bedtime  loratadine 10 milliGRAM(s) Oral daily  metoprolol tartrate 50 milliGRAM(s) Oral two times a day  pantoprazole    Tablet 40 milliGRAM(s) Oral before breakfast  sodium chloride 0.9%. 1000 milliLiter(s) (115 mL/Hr) IV Continuous <Continuous>    MEDICATIONS  (PRN):  cyclobenzaprine 10 milliGRAM(s) Oral three times a day PRN Muscle Spasm  dextrose 40% Gel 15 Gram(s) Oral once PRN Blood Glucose LESS THAN 70 milliGRAM(s)/deciliter  glucagon  Injectable 1 milliGRAM(s) IntraMuscular once PRN Glucose LESS THAN 70 milligrams/deciliter  HYDROmorphone  Injectable 4 milliGRAM(s) IV Push every 4 hours PRN Severe Pain (7 - 10)      ALLERGIES:  Allergies    No Known Allergies    Intolerances        LABS:                        11.9   7.31  )-----------( 143      ( 31 Aug 2020 06:37 )             36.6     08-31    139  |  102  |  8   ----------------------------<  113<H>  4.0   |  30  |  0.67    Ca    8.6      31 Aug 2020 06:37  Phos  2.3     08-31  Mg     1.4     08-31    TPro  6.0  /  Alb  3.3  /  TBili  0.4  /  DBili  x   /  AST  41<H>  /  ALT  21  /  AlkPhos  73  08-31    PT/INR - ( 31 Aug 2020 06:37 )   PT: 13.9 sec;   INR: 1.17          PTT - ( 31 Aug 2020 06:37 )  PTT:34.9 sec    CAPILLARY BLOOD GLUCOSE      POCT Blood Glucose.: 103 mg/dL (31 Aug 2020 12:05)        RADIOLOGY & ADDITIONAL TESTS: Reviewed. OVERNIGHT EVENTS: BAILEE     SUBJECTIVE / INTERVAL HPI: Patient seen and examined at bedside. C/o severe pain down spine and sacrum b/l. Also c/o muscle spasms in his neck and shoulders. Also reports he has not had BM in 6 days. This afternoon patient felt "like he was leaving his body" and c/o sweating and CP after getting IV magnesium. Vitals stable at the time and EKG with slight t wave changes from prior. Troponins sent. Denies N/V, fever, chills, SOB, abdominal pain. All other ROS otherwise negative.    VITAL SIGNS:  Vital Signs Last 24 Hrs  T(C): 37 (31 Aug 2020 13:19), Max: 37.1 (31 Aug 2020 05:23)  T(F): 98.6 (31 Aug 2020 13:19), Max: 98.7 (31 Aug 2020 05:23)  HR: 82 (31 Aug 2020 13:19) (74 - 82)  BP: 138/85 (31 Aug 2020 13:19) (113/65 - 138/85)  BP(mean): --  RR: 18 (31 Aug 2020 13:19) (17 - 18)  SpO2: 94% (31 Aug 2020 13:19) (93% - 95%)    08-30-20 @ 07:01  -  08-31-20 @ 07:00  --------------------------------------------------------  IN: 0 mL / OUT: 1650 mL / NET: -1650 mL        PHYSICAL EXAM:    General: WDWN. Resting in bed. Speaking in full sentences.   HEENT: NC/AT; PERRL, anicteric sclera; MMM  Neck: supple  Cardiovascular: +S1/S2; RRR  Respiratory: CTA B/L; no W/R/R  Gastrointestinal: soft, distended,  NT; +BSx4  Extremities: WWP; no edema, clubbing or cyanosis  Vascular: 2+ radial, DP/PT pulses B/L  Neurological: AAOx3; no focal deficits    MEDICATIONS:  MEDICATIONS  (STANDING):  aspirin enteric coated 81 milliGRAM(s) Oral daily  atorvastatin 20 milliGRAM(s) Oral at bedtime  dextrose 5%. 1000 milliLiter(s) (50 mL/Hr) IV Continuous <Continuous>  dextrose 50% Injectable 12.5 Gram(s) IV Push once  dextrose 50% Injectable 25 Gram(s) IV Push once  dextrose 50% Injectable 25 Gram(s) IV Push once  fenofibrate Tablet 145 milliGRAM(s) Oral daily  gabapentin 800 milliGRAM(s) Oral every 12 hours  insulin lispro (HumaLOG) corrective regimen sliding scale   SubCutaneous Before meals and at bedtime  loratadine 10 milliGRAM(s) Oral daily  metoprolol tartrate 50 milliGRAM(s) Oral two times a day  pantoprazole    Tablet 40 milliGRAM(s) Oral before breakfast  sodium chloride 0.9%. 1000 milliLiter(s) (115 mL/Hr) IV Continuous <Continuous>    MEDICATIONS  (PRN):  cyclobenzaprine 10 milliGRAM(s) Oral three times a day PRN Muscle Spasm  dextrose 40% Gel 15 Gram(s) Oral once PRN Blood Glucose LESS THAN 70 milliGRAM(s)/deciliter  glucagon  Injectable 1 milliGRAM(s) IntraMuscular once PRN Glucose LESS THAN 70 milligrams/deciliter  HYDROmorphone  Injectable 4 milliGRAM(s) IV Push every 4 hours PRN Severe Pain (7 - 10)      ALLERGIES:  Allergies    No Known Allergies    Intolerances        LABS:                        11.9   7.31  )-----------( 143      ( 31 Aug 2020 06:37 )             36.6     08-31    139  |  102  |  8   ----------------------------<  113<H>  4.0   |  30  |  0.67    Ca    8.6      31 Aug 2020 06:37  Phos  2.3     08-31  Mg     1.4     08-31    TPro  6.0  /  Alb  3.3  /  TBili  0.4  /  DBili  x   /  AST  41<H>  /  ALT  21  /  AlkPhos  73  08-31    PT/INR - ( 31 Aug 2020 06:37 )   PT: 13.9 sec;   INR: 1.17          PTT - ( 31 Aug 2020 06:37 )  PTT:34.9 sec    CAPILLARY BLOOD GLUCOSE      POCT Blood Glucose.: 103 mg/dL (31 Aug 2020 12:05)        RADIOLOGY & ADDITIONAL TESTS: Reviewed.

## 2020-08-31 NOTE — PROGRESS NOTE ADULT - PROBLEM SELECTOR PLAN 1
Patient with history of chronic back pain, with cervical spine surgery and lumbar and thoracic disc herniations. Has nerve stimulator and morphine pain pump. Pump still working but nerve stimulator stopped working yesterday evening – was initially placed in 2011. Pain mgmt doctor (Dr. Gerardo [phone]) with plan to replace nerve stimulator in OR.  -Patient initially scheduled for OR today but procedure pushed off due to EKG changes. Trops negative now, will continue to trend and plan for OR tomorrow if negative.   -pain management w/ IV hydromorphone 4mg IVP q 4 hr PRN for pain >7   -morphine pump still working  -NPO at midnight for OR 9/1   -start IV fluids in AM   -c/w cyclobenzaprine 10mg PO TID   -c/w gabapentin 800 mg BID

## 2020-08-31 NOTE — PROGRESS NOTE ADULT - SUBJECTIVE AND OBJECTIVE BOX
Patient was seen and examined by me at bedside. I agree with resident's note, subjective, objective physical exam, assessment and plan with following modifications/additions.    Greater than 35 minutes spent on total encounter; more than 50% of the visit was spent counseling and/or coordinating care by the attending physician.    55 y.o. M PMHx HTN, HLD, DM, MI (2012), GERD, Crohn’s, non Hodgkins lymphoma, hypogammaglobulinemia , chronic back pain s/p nerve stimulator and pain pump with morphine presented to ED for malfunction of nerve stimulator and associated back pain needing stimulator replacement. Plan for replacement today evening and dc home per discussion with pain attending. However this afternoon, pt developed diaphoresis and difficulty breathing and diffuse numbness in UE in setting of magnesium infusion, infusion stopped with resolution of symptoms, EKG done unchanged from prior. Will monitor today with trop x2 and repeat EKG jeri given cardiac hx to ensure no ACS, likely drug reaction to magnesium. If stable overnight, tomorrow will do pain pump replacement and dc home. Also bowel regimen today for chronic constipation.     Jeramie Chirinos MD [phone] Patient was seen and examined by me at bedside. I agree with resident's note, subjective, objective physical exam, assessment and plan with following modifications/additions.    Greater than 35 minutes spent on total encounter; more than 50% of the visit was spent counseling and/or coordinating care by the attending physician.    55 y.o. M PMHx HTN, HLD, DM, MI (2012), GERD, Crohn’s, non Hodgkins lymphoma, hypogammaglobulinemia , chronic back pain s/p nerve stimulator and pain pump with morphine presented to ED for malfunction of nerve stimulator and associated back pain needing stimulator replacement. Plan for replacement today evening and dc home per discussion with pain attending. However this afternoon, pt developed diaphoresis and difficulty breathing and diffuse numbness in UE in setting of magnesium infusion, infusion stopped with resolution of symptoms, EKG done unchanged from prior. Will monitor today with serial EKG and trop x2 jeri given cardiac hx to ensure no ACS, likely drug reaction to magnesium. If stable overnight, tomorrow will do pain pump replacement and dc home. Also bowel regimen today for chronic constipation.     Jeramie Chirinos MD [phone]

## 2020-09-01 ENCOUNTER — TRANSCRIPTION ENCOUNTER (OUTPATIENT)
Age: 55
End: 2020-09-01

## 2020-09-01 LAB
ALBUMIN SERPL ELPH-MCNC: 4 G/DL — SIGNIFICANT CHANGE UP (ref 3.3–5)
ALP SERPL-CCNC: 85 U/L — SIGNIFICANT CHANGE UP (ref 40–120)
ALT FLD-CCNC: 20 U/L — SIGNIFICANT CHANGE UP (ref 10–45)
ANION GAP SERPL CALC-SCNC: 12 MMOL/L — SIGNIFICANT CHANGE UP (ref 5–17)
APTT BLD: 37 SEC — HIGH (ref 27.5–35.5)
AST SERPL-CCNC: 35 U/L — SIGNIFICANT CHANGE UP (ref 10–40)
BASOPHILS # BLD AUTO: 0.07 K/UL — SIGNIFICANT CHANGE UP (ref 0–0.2)
BASOPHILS NFR BLD AUTO: 0.7 % — SIGNIFICANT CHANGE UP (ref 0–2)
BILIRUB SERPL-MCNC: 0.4 MG/DL — SIGNIFICANT CHANGE UP (ref 0.2–1.2)
BUN SERPL-MCNC: 8 MG/DL — SIGNIFICANT CHANGE UP (ref 7–23)
CALCIUM SERPL-MCNC: 9 MG/DL — SIGNIFICANT CHANGE UP (ref 8.4–10.5)
CHLORIDE SERPL-SCNC: 101 MMOL/L — SIGNIFICANT CHANGE UP (ref 96–108)
CO2 SERPL-SCNC: 26 MMOL/L — SIGNIFICANT CHANGE UP (ref 22–31)
CREAT SERPL-MCNC: 0.61 MG/DL — SIGNIFICANT CHANGE UP (ref 0.5–1.3)
EOSINOPHIL # BLD AUTO: 0.42 K/UL — SIGNIFICANT CHANGE UP (ref 0–0.5)
EOSINOPHIL NFR BLD AUTO: 4.3 % — SIGNIFICANT CHANGE UP (ref 0–6)
GLUCOSE BLDC GLUCOMTR-MCNC: 115 MG/DL — HIGH (ref 70–99)
GLUCOSE BLDC GLUCOMTR-MCNC: 122 MG/DL — HIGH (ref 70–99)
GLUCOSE BLDC GLUCOMTR-MCNC: 149 MG/DL — HIGH (ref 70–99)
GLUCOSE SERPL-MCNC: 127 MG/DL — HIGH (ref 70–99)
HCT VFR BLD CALC: 42 % — SIGNIFICANT CHANGE UP (ref 39–50)
HGB BLD-MCNC: 13.4 G/DL — SIGNIFICANT CHANGE UP (ref 13–17)
IMM GRANULOCYTES NFR BLD AUTO: 0.2 % — SIGNIFICANT CHANGE UP (ref 0–1.5)
INR BLD: 1.09 — SIGNIFICANT CHANGE UP (ref 0.88–1.16)
LYMPHOCYTES # BLD AUTO: 3.01 K/UL — SIGNIFICANT CHANGE UP (ref 1–3.3)
LYMPHOCYTES # BLD AUTO: 30.9 % — SIGNIFICANT CHANGE UP (ref 13–44)
MAGNESIUM SERPL-MCNC: 1.8 MG/DL — SIGNIFICANT CHANGE UP (ref 1.6–2.6)
MCHC RBC-ENTMCNC: 27.7 PG — SIGNIFICANT CHANGE UP (ref 27–34)
MCHC RBC-ENTMCNC: 31.9 GM/DL — LOW (ref 32–36)
MCV RBC AUTO: 86.8 FL — SIGNIFICANT CHANGE UP (ref 80–100)
MONOCYTES # BLD AUTO: 0.66 K/UL — SIGNIFICANT CHANGE UP (ref 0–0.9)
MONOCYTES NFR BLD AUTO: 6.8 % — SIGNIFICANT CHANGE UP (ref 2–14)
NEUTROPHILS # BLD AUTO: 5.56 K/UL — SIGNIFICANT CHANGE UP (ref 1.8–7.4)
NEUTROPHILS NFR BLD AUTO: 57.1 % — SIGNIFICANT CHANGE UP (ref 43–77)
NRBC # BLD: 0 /100 WBCS — SIGNIFICANT CHANGE UP (ref 0–0)
PHOSPHATE SERPL-MCNC: 2.6 MG/DL — SIGNIFICANT CHANGE UP (ref 2.5–4.5)
PLATELET # BLD AUTO: 187 K/UL — SIGNIFICANT CHANGE UP (ref 150–400)
POTASSIUM SERPL-MCNC: 4.4 MMOL/L — SIGNIFICANT CHANGE UP (ref 3.5–5.3)
POTASSIUM SERPL-SCNC: 4.4 MMOL/L — SIGNIFICANT CHANGE UP (ref 3.5–5.3)
PROT SERPL-MCNC: 7.4 G/DL — SIGNIFICANT CHANGE UP (ref 6–8.3)
PROTHROM AB SERPL-ACNC: 13 SEC — SIGNIFICANT CHANGE UP (ref 10.6–13.6)
RBC # BLD: 4.84 M/UL — SIGNIFICANT CHANGE UP (ref 4.2–5.8)
RBC # FLD: 13.8 % — SIGNIFICANT CHANGE UP (ref 10.3–14.5)
SODIUM SERPL-SCNC: 139 MMOL/L — SIGNIFICANT CHANGE UP (ref 135–145)
WBC # BLD: 9.74 K/UL — SIGNIFICANT CHANGE UP (ref 3.8–10.5)
WBC # FLD AUTO: 9.74 K/UL — SIGNIFICANT CHANGE UP (ref 3.8–10.5)

## 2020-09-01 PROCEDURE — 99239 HOSP IP/OBS DSCHRG MGMT >30: CPT | Mod: GC

## 2020-09-01 PROCEDURE — 88300 SURGICAL PATH GROSS: CPT | Mod: 26

## 2020-09-01 RX ORDER — SODIUM CHLORIDE 9 MG/ML
1000 INJECTION, SOLUTION INTRAVENOUS
Refills: 0 | Status: DISCONTINUED | OUTPATIENT
Start: 2020-09-01 | End: 2020-09-02

## 2020-09-01 RX ORDER — GLUCAGON INJECTION, SOLUTION 0.5 MG/.1ML
1 INJECTION, SOLUTION SUBCUTANEOUS ONCE
Refills: 0 | Status: DISCONTINUED | OUTPATIENT
Start: 2020-09-01 | End: 2020-09-02

## 2020-09-01 RX ORDER — GABAPENTIN 400 MG/1
800 CAPSULE ORAL EVERY 12 HOURS
Refills: 0 | Status: DISCONTINUED | OUTPATIENT
Start: 2020-09-01 | End: 2020-09-02

## 2020-09-01 RX ORDER — INSULIN LISPRO 100/ML
VIAL (ML) SUBCUTANEOUS
Refills: 0 | Status: DISCONTINUED | OUTPATIENT
Start: 2020-09-01 | End: 2020-09-02

## 2020-09-01 RX ORDER — DEXTROSE 50 % IN WATER 50 %
25 SYRINGE (ML) INTRAVENOUS ONCE
Refills: 0 | Status: DISCONTINUED | OUTPATIENT
Start: 2020-09-01 | End: 2020-09-02

## 2020-09-01 RX ORDER — LORATADINE 10 MG/1
10 TABLET ORAL DAILY
Refills: 0 | Status: DISCONTINUED | OUTPATIENT
Start: 2020-09-01 | End: 2020-09-02

## 2020-09-01 RX ORDER — ASPIRIN/CALCIUM CARB/MAGNESIUM 324 MG
81 TABLET ORAL DAILY
Refills: 0 | Status: DISCONTINUED | OUTPATIENT
Start: 2020-09-01 | End: 2020-09-02

## 2020-09-01 RX ORDER — DEXTROSE 50 % IN WATER 50 %
12.5 SYRINGE (ML) INTRAVENOUS ONCE
Refills: 0 | Status: DISCONTINUED | OUTPATIENT
Start: 2020-09-01 | End: 2020-09-02

## 2020-09-01 RX ORDER — PANTOPRAZOLE SODIUM 20 MG/1
40 TABLET, DELAYED RELEASE ORAL
Refills: 0 | Status: DISCONTINUED | OUTPATIENT
Start: 2020-09-01 | End: 2020-09-02

## 2020-09-01 RX ORDER — HYDROMORPHONE HYDROCHLORIDE 2 MG/ML
4 INJECTION INTRAMUSCULAR; INTRAVENOUS; SUBCUTANEOUS ONCE
Refills: 0 | Status: DISCONTINUED | OUTPATIENT
Start: 2020-09-01 | End: 2020-09-01

## 2020-09-01 RX ORDER — CYCLOBENZAPRINE HYDROCHLORIDE 10 MG/1
10 TABLET, FILM COATED ORAL THREE TIMES A DAY
Refills: 0 | Status: DISCONTINUED | OUTPATIENT
Start: 2020-09-01 | End: 2020-09-02

## 2020-09-01 RX ORDER — ATORVASTATIN CALCIUM 80 MG/1
20 TABLET, FILM COATED ORAL AT BEDTIME
Refills: 0 | Status: DISCONTINUED | OUTPATIENT
Start: 2020-09-01 | End: 2020-09-02

## 2020-09-01 RX ORDER — SODIUM CHLORIDE 9 MG/ML
1000 INJECTION INTRAMUSCULAR; INTRAVENOUS; SUBCUTANEOUS
Refills: 0 | Status: DISCONTINUED | OUTPATIENT
Start: 2020-09-01 | End: 2020-09-01

## 2020-09-01 RX ORDER — DEXTROSE 50 % IN WATER 50 %
15 SYRINGE (ML) INTRAVENOUS ONCE
Refills: 0 | Status: DISCONTINUED | OUTPATIENT
Start: 2020-09-01 | End: 2020-09-02

## 2020-09-01 RX ORDER — METOPROLOL TARTRATE 50 MG
50 TABLET ORAL
Refills: 0 | Status: DISCONTINUED | OUTPATIENT
Start: 2020-09-01 | End: 2020-09-02

## 2020-09-01 RX ORDER — FENOFIBRATE,MICRONIZED 130 MG
145 CAPSULE ORAL DAILY
Refills: 0 | Status: DISCONTINUED | OUTPATIENT
Start: 2020-09-01 | End: 2020-09-02

## 2020-09-01 RX ADMIN — ATORVASTATIN CALCIUM 20 MILLIGRAM(S): 80 TABLET, FILM COATED ORAL at 21:55

## 2020-09-01 RX ADMIN — HYDROMORPHONE HYDROCHLORIDE 4 MILLIGRAM(S): 2 INJECTION INTRAMUSCULAR; INTRAVENOUS; SUBCUTANEOUS at 19:54

## 2020-09-01 RX ADMIN — Medication 145 MILLIGRAM(S): at 12:32

## 2020-09-01 RX ADMIN — Medication 50 MILLIGRAM(S): at 05:33

## 2020-09-01 RX ADMIN — HYDROMORPHONE HYDROCHLORIDE 4 MILLIGRAM(S): 2 INJECTION INTRAMUSCULAR; INTRAVENOUS; SUBCUTANEOUS at 04:21

## 2020-09-01 RX ADMIN — SODIUM CHLORIDE 115 MILLILITER(S): 9 INJECTION INTRAMUSCULAR; INTRAVENOUS; SUBCUTANEOUS at 12:26

## 2020-09-01 RX ADMIN — LORATADINE 10 MILLIGRAM(S): 10 TABLET ORAL at 12:32

## 2020-09-01 RX ADMIN — HYDROMORPHONE HYDROCHLORIDE 4 MILLIGRAM(S): 2 INJECTION INTRAMUSCULAR; INTRAVENOUS; SUBCUTANEOUS at 13:50

## 2020-09-01 RX ADMIN — HYDROMORPHONE HYDROCHLORIDE 4 MILLIGRAM(S): 2 INJECTION INTRAMUSCULAR; INTRAVENOUS; SUBCUTANEOUS at 08:49

## 2020-09-01 RX ADMIN — GABAPENTIN 800 MILLIGRAM(S): 400 CAPSULE ORAL at 20:24

## 2020-09-01 RX ADMIN — HYDROMORPHONE HYDROCHLORIDE 4 MILLIGRAM(S): 2 INJECTION INTRAMUSCULAR; INTRAVENOUS; SUBCUTANEOUS at 13:31

## 2020-09-01 RX ADMIN — CYCLOBENZAPRINE HYDROCHLORIDE 10 MILLIGRAM(S): 10 TABLET, FILM COATED ORAL at 21:59

## 2020-09-01 RX ADMIN — HYDROMORPHONE HYDROCHLORIDE 4 MILLIGRAM(S): 2 INJECTION INTRAMUSCULAR; INTRAVENOUS; SUBCUTANEOUS at 04:36

## 2020-09-01 RX ADMIN — HYDROMORPHONE HYDROCHLORIDE 4 MILLIGRAM(S): 2 INJECTION INTRAMUSCULAR; INTRAVENOUS; SUBCUTANEOUS at 20:25

## 2020-09-01 RX ADMIN — GABAPENTIN 800 MILLIGRAM(S): 400 CAPSULE ORAL at 09:37

## 2020-09-01 RX ADMIN — Medication 81 MILLIGRAM(S): at 21:55

## 2020-09-01 RX ADMIN — PANTOPRAZOLE SODIUM 40 MILLIGRAM(S): 20 TABLET, DELAYED RELEASE ORAL at 05:33

## 2020-09-01 RX ADMIN — HYDROMORPHONE HYDROCHLORIDE 4 MILLIGRAM(S): 2 INJECTION INTRAMUSCULAR; INTRAVENOUS; SUBCUTANEOUS at 00:01

## 2020-09-01 RX ADMIN — HYDROMORPHONE HYDROCHLORIDE 4 MILLIGRAM(S): 2 INJECTION INTRAMUSCULAR; INTRAVENOUS; SUBCUTANEOUS at 10:10

## 2020-09-01 NOTE — BRIEF OPERATIVE NOTE - NSICDXBRIEFPROCEDURE_GEN_ALL_CORE_FT
PROCEDURES:  Fluoroscopic guidance, with needle or catheter tip localization, for spine or paraspinal injection 01-Sep-2020 18:04:15  Do Latanya Hair  Complex reprogramming of spinal cord stimulator for initial 60 minutes 01-Sep-2020 18:03:11  Do Latanya Hair  Adjacent tissue transfer of trunk, defect size 5.1 to 10.0 sq cm 01-Sep-2020 18:03:03  Do Latanya Hair  Insertion, spinal pulse  01-Sep-2020 18:02:46  Do Latanya Hair

## 2020-09-01 NOTE — DISCHARGE NOTE PROVIDER - HOSPITAL COURSE
55 y.o. M PMHx HTN, HLD, DM, MI (2012), GERD, Crohn’s, non Hodgkins lymphoma, hypogammaglobulinemia , chronic back pain s/p nerve stimulator and pain pump with morphine presented to ED with worsening back pain 2/2 malfunctioning nerve stimulator.        Problem List/Main Diagnoses (system-based):              #Back pain.  Plan: Patient with history of chronic back pain, with cervical spine surgery and lumbar and thoracic disc herniations. Has nerve stimulator and morphine pain pump. Pump still working but nerve stimulator stopped working yesterday evening – was initially placed in 2011. Pain mgmt doctor (Dr. Gerardo [phone]) with plan to replace nerve stimulator in OR.    -Patient initially scheduled for OR today but procedure pushed off due to EKG changes. Trops negative now, will continue to trend and plan for OR tomorrow if negative.     -pain management w/ IV hydromorphone 4mg IVP q 4 hr PRN for pain >7     -morphine pump still working    -NPO at midnight for OR 9/1     -start IV fluids in AM     -c/w cyclobenzaprine 10mg PO TID     -c/w gabapentin 800 mg BID.             #CAD (coronary artery disease).  Plan: Hx of MI in 2012. No stents. EKG today with slight t wave changes from prior. Trops negative for now will f/u repeat.    -c/w asa 81    -c/w metroprolol 50 BID.          #Hypercholesteremia.  Plan: On rosuvastatin 5 and fenofibrate 134 at home.    -c/w atorvastatin 20    -c/w fenofibrate 145.         #Diabetes.  Plan: - moderate ISF-25 lispro sliding scale AC+qHS    - monitor FS    - consistent carb diet.      GERD (gastroesophageal reflux disease).  Plan: On esomeprazole 40 at home.    -c/w pantoprazole 40.         #HTN (hypertension). Plan: c/w home metoprolol.        Inpatient treatment course:     New medications:     Labs to be followed outpatient:     Exam to be followed outpatient: 55 y.o. M PMHx HTN, HLD, DM, MI (2012), GERD, Crohn’s, non Hodgkins lymphoma, hypogammaglobulinemia , chronic back pain s/p nerve stimulator and pain pump with morphine presented to ED with worsening back pain 2/2 malfunctioning nerve stimulator. Patient now medically stable s/p procedure for safe discharge home.        Problem List/Main Diagnoses (system-based):         #Back pain: Patient with history of chronic back pain, with cervical hx of spine surgery, lumbar and thoracic disc herniations. Has nerve stimulator and morphine pain pump. Pump still working but nerve stimulator stopped working yesterday evening – was initially placed in 2011. Patient underwent procedure to replace nerve stimulator in OR (9/1).    Patient initially scheduled for OR 8/31 but procedure pushed off due to concern for possible EKG changes. Trops negative and patient cleared for surgery.     -preop patient received IV fluids and pain management w/ IV hydromorphone 4mg IVP q 4 hr PRN for pain >7.     -post op management per Dr. Gerardo     -c/w cyclobenzaprine 10mg PO TID PRN    -c/w gabapentin 800 mg BID    -c/w morphine pump PRN        #CAD (coronary artery disease): Patient w/ Hx of MI in 2012. No stents. EKG 8/31 w/ possible EKG changes. Trops negative. Patient medically cleared for procedure.     -c/w asa 81    -c/w metroprolol 50 BID         #Hypercholesteremia: On rosuvastatin 5 and fenofibrate 134 at home.    -c/w atorvastatin 20 mg PO qhs    -c/w fenofibrate 145 mg PO qd        #Prediabetes: A1c 6.3 (8/31)    -moderate ISF-25 lispro sliding scale AC+qHS    -patient counseled on importance of nutrition and exercise         #GERD: On esomeprazole 40 at home.    -c/w pantoprazole 40mg PO daily        #HTN: hx of htn on metoprolol 50 mg at home    -c/w metoprolol 50 mg PO BID        New medications: none        Labs to be followed outpatient: none        Exam to be followed outpatient: none

## 2020-09-01 NOTE — PROGRESS NOTE ADULT - REASON FOR ADMISSION
increase pain due to   DCS battery dead
Procedure- Nerve stimulator exchange
increae pain , DCS - battery dead

## 2020-09-01 NOTE — BRIEF OPERATIVE NOTE - NSICDXBRIEFPREOP_GEN_ALL_CORE_FT
PRE-OP DIAGNOSIS:  Chronic pain disorder 01-Sep-2020 18:05:05  Do Latanya Hair  Lumbar radiculitis 01-Sep-2020 18:04:58  Do Latanya Hair

## 2020-09-01 NOTE — PACU DISCHARGE NOTE - COMMENTS
Patient aaox4, denies pain , VSS, seen by rep for nerve stimulator with instructions provided at bedside, offered food to eat transferred via bed to floor

## 2020-09-01 NOTE — PROGRESS NOTE ADULT - SUBJECTIVE AND OBJECTIVE BOX
Patient seen and examined. Agree with resident's findings, assessment and plan. Agree with discharge diagnoses, as well as with plan of care and goals.  Time spent: 35 minutes for evaluation, plan of care, patient education, medication reconciliation, coordination of care, follow ups and transition to outpatient.     PE: AOx3, lungs clear CV RRR abd soft, nt ext wwp no sig le edema    55 y.o. M PMHx HTN, HLD, DM, MI (2012), GERD, Crohn’s, non Hodgkins lymphoma, hypogammaglobulinemia , chronic back pain s/p nerve stimulator and pain pump with morphine presented to ED for malfunction of nerve stimulator and associated back pain needing stimulator replacement. Course c/b episode of chest pressure and feeling unwell in setting of Mg, ruled out for ACS. Feeling back to baseline today, plan for procedure, and then dc home with pain management followup.

## 2020-09-01 NOTE — PROGRESS NOTE ADULT - SUBJECTIVE AND OBJECTIVE BOX
Pt is a 57 year old male s/p Dorsal Angelique Stimulator battery replacement under [facility]/Local with no complications.  Patient to be discharged home today  Patient was brought into PACU , battery will be started. No complications

## 2020-09-01 NOTE — PROGRESS NOTE ADULT - ASSESSMENT
Chronic  Pain Syndrome  DCS battery failure  s/p Battery removal/replacement  no complications  dc home  sent Levaquim 750 mg po qd for 10 days  postop meds sent already electronically to his pharmacy  continue intrathecal pump   continue other meds  follow up in 10 days for postop  no change in dressing required
55 y.o. M PMHx HTN, HLD, DM, MI (2012), GERD, Crohn’s, non Hodgkins lymphoma, hypogammaglobulinemia , chronic back pain s/p nerve stimulator and pain pump with morphine presented to ED with worsening back pain 2/2 malfunctioning nerve stimulator.

## 2020-09-01 NOTE — DISCHARGE NOTE PROVIDER - NSDCMRMEDTOKEN_GEN_ALL_CORE_FT
Aspir 81 oral delayed release tablet: 1 tab(s) orally once a day  aspirin 81 mg oral tablet: 1 tab(s) orally once a day  Colace 10 mg/mL oral liquid: 10 milliliter(s) orally 2 times a day  cyclobenzaprine 10 mg oral tablet: 1 tab(s) orally 3 times a day  esomeprazole 40 mg oral delayed release capsule: 1 cap(s) orally once a day  fenofibrate 134 mg oral capsule: 1 cap(s) orally once a day  fenofibrate 134 mg oral capsule: 1 cap(s) orally once a day  Flexeril 10 mg oral tablet: 1 tab(s) orally 3 times a day  gabapentin 800 mg oral tablet: 1 tab(s) orally 3 times a day  gabapentin 800 mg oral tablet: 1 tab(s) orally once a day  metFORMIN 500 mg oral tablet: 1 tab(s) orally 2 times a day  metoprolol tartrate 50 mg oral tablet: 1 tab(s) orally 2 times a day  metoprolol tartrate 50 mg oral tablet: 1 tab(s) orally 2 times a day  morphine 24 hour extended release: orally every 4 hours  Mucinex 600 mg oral tablet, extended release: 1 tab(s) orally every 12 hours, As Needed  NexIUM 40 mg oral powder for reconstitution, delayed release: 1 each orally once a day  Percocet 10/325 oral tablet: 1 tab(s) orally every 6 hours  rosuvastatin 5 mg oral tablet: 1 tab(s) orally once a day  ZyrTEC 10 mg oral tablet: 1 tab(s) orally once a day aspirin 81 mg oral tablet: 1 tab(s) orally once a day  Colace 10 mg/mL oral liquid: 10 milliliter(s) orally 2 times a day  cyclobenzaprine 10 mg oral tablet: 1 tab(s) orally 3 times a day  esomeprazole 40 mg oral delayed release capsule: 1 cap(s) orally once a day  fenofibrate 134 mg oral capsule: 1 cap(s) orally once a day  gabapentin 800 mg oral tablet: 1 tab(s) orally 3 times a day  gabapentin 800 mg oral tablet: 1 tab(s) orally once a day  levoFLOXacin 750 mg oral tablet: 1 tab(s) orally every 24 hours for 10 days per Dr Gerardo  metFORMIN 500 mg oral tablet: 1 tab(s) orally 2 times a day  metoprolol tartrate 50 mg oral tablet: 1 tab(s) orally 2 times a day  morphine 24 hour extended release: orally every 4 hours  Mucinex 600 mg oral tablet, extended release: 1 tab(s) orally every 12 hours, As Needed  NexIUM 40 mg oral powder for reconstitution, delayed release: 1 each orally once a day  Percocet 10/325 oral tablet: 1 tab(s) orally every 6 hours  rosuvastatin 5 mg oral tablet: 1 tab(s) orally once a day  ZyrTEC 10 mg oral tablet: 1 tab(s) orally once a day

## 2020-09-01 NOTE — DISCHARGE NOTE PROVIDER - NSDCCPCAREPLAN_GEN_ALL_CORE_FT
PRINCIPAL DISCHARGE DIAGNOSIS  Diagnosis: Back pain  Assessment and Plan of Treatment: You have a history of chronic back pain which requires use of a morphine pump and nerve stimulator for pain management. Your nerve stimulator malfactioned PRINCIPAL DISCHARGE DIAGNOSIS  Diagnosis: Back pain  Assessment and Plan of Treatment: You have a history of chronic back pain which requires use of a morphine pump and nerve stimulator for pain management. Your nerve stimulator malfactioned and needed to be replaced in the OR. You were inittaly scheduled for the procedure on 8/31 but your procedure was pushed off after you had an adverse reaction to the IV magnesium. We did an EKG and checked your troponins( a test to detect injury to the heart) which were normal, and you were then cleared for the procedure on 9/1. Please follow the postoperative instructions that Dr. Rubio provides you with and schedule a follow up appointment with Dr. Burr.      SECONDARY DISCHARGE DIAGNOSES  Diagnosis: Prediabetes  Assessment and Plan of Treatment: You have a history of prediabetes for which you take metformin. Your A1c on 8/31 was 6.3. It is important that you continue to follow a low carb diet, exercise (as tolerated)  and continue to take your metformin. PRINCIPAL DISCHARGE DIAGNOSIS  Diagnosis: Back pain  Assessment and Plan of Treatment: You have a history of chronic back pain which requires use of a morphine pump and nerve stimulator for pain management. Your nerve stimulator malfactioned and needed to be replaced in the OR. You were inittaly scheduled for the procedure on 8/31 but your procedure was pushed off after you had an adverse reaction to the IV magnesium. We did an EKG and checked your troponins( a test to detect injury to the heart) which were normal, and you were then cleared for the procedure on 9/1. Please follow the postoperative instructions that Dr. Rubio provides you with and schedule a follow up appointment with Dr. Burr.  Please take an anitbiotic, Levaquin 750mg once a day for 10 days per Dr. Gerardo.  Your prescription was sent to your pharmacy.      SECONDARY DISCHARGE DIAGNOSES  Diagnosis: Prediabetes  Assessment and Plan of Treatment: You have a history of prediabetes for which you take metformin. Your A1c on 8/31 was 6.3. It is important that you continue to follow a low carb diet, exercise (as tolerated)  and continue to take your metformin.

## 2020-09-02 ENCOUNTER — TRANSCRIPTION ENCOUNTER (OUTPATIENT)
Age: 55
End: 2020-09-02

## 2020-09-02 VITALS
HEART RATE: 91 BPM | DIASTOLIC BLOOD PRESSURE: 83 MMHG | OXYGEN SATURATION: 95 % | TEMPERATURE: 99 F | RESPIRATION RATE: 18 BRPM | SYSTOLIC BLOOD PRESSURE: 137 MMHG

## 2020-09-02 LAB — GLUCOSE BLDC GLUCOMTR-MCNC: 152 MG/DL — HIGH (ref 70–99)

## 2020-09-02 PROCEDURE — 99285 EMERGENCY DEPT VISIT HI MDM: CPT

## 2020-09-02 PROCEDURE — 36415 COLL VENOUS BLD VENIPUNCTURE: CPT

## 2020-09-02 PROCEDURE — 84100 ASSAY OF PHOSPHORUS: CPT

## 2020-09-02 PROCEDURE — C1787: CPT

## 2020-09-02 PROCEDURE — 85730 THROMBOPLASTIN TIME PARTIAL: CPT

## 2020-09-02 PROCEDURE — 80053 COMPREHEN METABOLIC PANEL: CPT

## 2020-09-02 PROCEDURE — 83735 ASSAY OF MAGNESIUM: CPT

## 2020-09-02 PROCEDURE — 76000 FLUOROSCOPY <1 HR PHYS/QHP: CPT

## 2020-09-02 PROCEDURE — 83036 HEMOGLOBIN GLYCOSYLATED A1C: CPT

## 2020-09-02 PROCEDURE — 71045 X-RAY EXAM CHEST 1 VIEW: CPT

## 2020-09-02 PROCEDURE — 93005 ELECTROCARDIOGRAM TRACING: CPT

## 2020-09-02 PROCEDURE — 86850 RBC ANTIBODY SCREEN: CPT

## 2020-09-02 PROCEDURE — 87635 SARS-COV-2 COVID-19 AMP PRB: CPT

## 2020-09-02 PROCEDURE — 85025 COMPLETE CBC W/AUTO DIFF WBC: CPT

## 2020-09-02 PROCEDURE — 88300 SURGICAL PATH GROSS: CPT

## 2020-09-02 PROCEDURE — 85610 PROTHROMBIN TIME: CPT

## 2020-09-02 PROCEDURE — 84484 ASSAY OF TROPONIN QUANT: CPT

## 2020-09-02 PROCEDURE — C1820: CPT

## 2020-09-02 PROCEDURE — 82962 GLUCOSE BLOOD TEST: CPT

## 2020-09-02 PROCEDURE — 86901 BLOOD TYPING SEROLOGIC RH(D): CPT

## 2020-09-02 RX ORDER — LEVOFLOXACIN 5 MG/ML
1 INJECTION, SOLUTION INTRAVENOUS
Qty: 0 | Refills: 0 | DISCHARGE
Start: 2020-09-02

## 2020-09-02 RX ORDER — HYDROMORPHONE HYDROCHLORIDE 2 MG/ML
4 INJECTION INTRAMUSCULAR; INTRAVENOUS; SUBCUTANEOUS ONCE
Refills: 0 | Status: DISCONTINUED | OUTPATIENT
Start: 2020-09-02 | End: 2020-09-02

## 2020-09-02 RX ORDER — METOPROLOL TARTRATE 50 MG
1 TABLET ORAL
Qty: 0 | Refills: 0 | DISCHARGE

## 2020-09-02 RX ORDER — ASPIRIN/CALCIUM CARB/MAGNESIUM 324 MG
1 TABLET ORAL
Qty: 0 | Refills: 0 | DISCHARGE

## 2020-09-02 RX ORDER — CYCLOBENZAPRINE HYDROCHLORIDE 10 MG/1
1 TABLET, FILM COATED ORAL
Qty: 0 | Refills: 0 | DISCHARGE

## 2020-09-02 RX ORDER — FENOFIBRATE,MICRONIZED 130 MG
1 CAPSULE ORAL
Qty: 0 | Refills: 0 | DISCHARGE

## 2020-09-02 RX ADMIN — Medication 2: at 08:04

## 2020-09-02 RX ADMIN — HYDROMORPHONE HYDROCHLORIDE 4 MILLIGRAM(S): 2 INJECTION INTRAMUSCULAR; INTRAVENOUS; SUBCUTANEOUS at 05:29

## 2020-09-02 RX ADMIN — HYDROMORPHONE HYDROCHLORIDE 4 MILLIGRAM(S): 2 INJECTION INTRAMUSCULAR; INTRAVENOUS; SUBCUTANEOUS at 01:38

## 2020-09-02 RX ADMIN — Medication 50 MILLIGRAM(S): at 06:32

## 2020-09-02 RX ADMIN — PANTOPRAZOLE SODIUM 40 MILLIGRAM(S): 20 TABLET, DELAYED RELEASE ORAL at 06:32

## 2020-09-02 RX ADMIN — HYDROMORPHONE HYDROCHLORIDE 4 MILLIGRAM(S): 2 INJECTION INTRAMUSCULAR; INTRAVENOUS; SUBCUTANEOUS at 04:59

## 2020-09-02 RX ADMIN — LORATADINE 10 MILLIGRAM(S): 10 TABLET ORAL at 11:12

## 2020-09-02 RX ADMIN — Medication 145 MILLIGRAM(S): at 11:12

## 2020-09-02 RX ADMIN — HYDROMORPHONE HYDROCHLORIDE 4 MILLIGRAM(S): 2 INJECTION INTRAMUSCULAR; INTRAVENOUS; SUBCUTANEOUS at 11:12

## 2020-09-02 RX ADMIN — Medication 81 MILLIGRAM(S): at 11:12

## 2020-09-02 RX ADMIN — GABAPENTIN 800 MILLIGRAM(S): 400 CAPSULE ORAL at 06:32

## 2020-09-02 RX ADMIN — HYDROMORPHONE HYDROCHLORIDE 4 MILLIGRAM(S): 2 INJECTION INTRAMUSCULAR; INTRAVENOUS; SUBCUTANEOUS at 01:08

## 2020-09-02 NOTE — PROGRESS NOTE ADULT - SUBJECTIVE AND OBJECTIVE BOX
Patient was seen and examined by me at bedside. I agree with resident's note, subjective, objective physical exam, assessment and plan with following modifications/additions.    Greater than 35 minutes spent on total encounter; more than 50% of the visit was spent counseling and/or coordinating care by the attending physician.    PE: AOx3, lungs clear, CV RRR, abd soft, nt, ext wwp no sig le edema.     55 y.o. M PMHx HTN, HLD, DM, MI (2012), GERD, Crohn’s, non Hodgkins lymphoma, hypogammaglobulinemia , chronic back pain s/p nerve stimulator and pain pump with morphine presented to ED for malfunction of nerve stimulator and associated back pain needing stimulator replacement. Course c/b episode of chest pressure and feeling unwell in setting of Mg, ruled out for ACS. S/p successful replacement of back pain stimulator, dc home today with outpt pain followup

## 2020-09-02 NOTE — DISCHARGE NOTE NURSING/CASE MANAGEMENT/SOCIAL WORK - PATIENT PORTAL LINK FT
You can access the FollowMyHealth Patient Portal offered by [facility] by registering at the following website: [email or web address]. By joining [facility]’s FollowMyHealth portal, you will also be able to view your health information using other applications (apps) compatible with our system.

## 2020-09-08 LAB — SURGICAL PATHOLOGY STUDY: SIGNIFICANT CHANGE UP

## 2021-07-08 NOTE — ED PROVIDER NOTE - CHIEF COMPLAINT
The patient is a 54y Male complaining of back pain general. Fluconazole Pregnancy And Lactation Text: This medication is Pregnancy Category C and it isn't know if it is safe during pregnancy. It is also excreted in breast milk.

## 2024-03-15 NOTE — PRE-OP CHECKLIST - AS BP NONINV METHOD
Called [facility] again to see what is going on with this addendum. Joseline did not answer her phone but they gave me her extension. Left message that we have been working on this for over a week, need to know how long it takes to get an addendum done, the status of this, and whether she is even the Joseline that is working on this. Gave her my direct line to contact. Joseline extension [phone]. Signed off in errror. Remind me created to check on this next week.  
electronic
electronic

## 2024-04-29 PROBLEM — K50.90 CROHN'S DISEASE, UNSPECIFIED, WITHOUT COMPLICATIONS: Chronic | Status: ACTIVE | Noted: 2020-08-30

## 2024-04-29 PROBLEM — K21.9 GASTRO-ESOPHAGEAL REFLUX DISEASE WITHOUT ESOPHAGITIS: Chronic | Status: ACTIVE | Noted: 2020-08-30

## 2024-04-29 PROBLEM — J30.2 OTHER SEASONAL ALLERGIC RHINITIS: Chronic | Status: ACTIVE | Noted: 2020-08-30

## 2024-04-29 PROBLEM — I10 ESSENTIAL (PRIMARY) HYPERTENSION: Chronic | Status: ACTIVE | Noted: 2020-08-30

## 2024-04-29 PROBLEM — I21.9 ACUTE MYOCARDIAL INFARCTION, UNSPECIFIED: Chronic | Status: ACTIVE | Noted: 2020-08-30

## 2024-04-29 PROBLEM — M54.9 DORSALGIA, UNSPECIFIED: Chronic | Status: ACTIVE | Noted: 2020-08-30

## 2024-04-29 PROBLEM — E11.9 TYPE 2 DIABETES MELLITUS WITHOUT COMPLICATIONS: Chronic | Status: ACTIVE | Noted: 2020-08-30

## 2024-04-29 PROBLEM — E78.00 PURE HYPERCHOLESTEROLEMIA, UNSPECIFIED: Chronic | Status: ACTIVE | Noted: 2020-08-30

## 2024-05-07 ENCOUNTER — APPOINTMENT (OUTPATIENT)
Dept: ORTHOPEDIC SURGERY | Facility: CLINIC | Age: 59
End: 2024-05-07
Payer: MEDICARE

## 2024-05-07 DIAGNOSIS — T85.192A OTHER MECHANICAL COMPLICATION OF IMPLANTED ELECTRONIC NEUROSTIMULAR OF SPINAL CORD, ELECTRODE (LEAD), INITIAL ENCOUNTER: ICD-10-CM

## 2024-05-07 DIAGNOSIS — M54.50 LOW BACK PAIN, UNSPECIFIED: ICD-10-CM

## 2024-05-07 DIAGNOSIS — G89.29 LOW BACK PAIN, UNSPECIFIED: ICD-10-CM

## 2024-05-07 PROBLEM — Z00.00 ENCOUNTER FOR PREVENTIVE HEALTH EXAMINATION: Status: ACTIVE | Noted: 2024-05-07

## 2024-05-07 PROBLEM — I25.2 OLD MYOCARDIAL INFARCTION: Chronic | Status: ACTIVE | Noted: 2024-04-29

## 2024-05-07 PROCEDURE — 72110 X-RAY EXAM L-2 SPINE 4/>VWS: CPT

## 2024-05-07 PROCEDURE — 99204 OFFICE O/P NEW MOD 45 MIN: CPT

## 2024-05-07 RX ORDER — FUROSEMIDE 20 MG/1
20 TABLET ORAL
Refills: 0 | Status: ACTIVE | COMMUNITY

## 2024-05-07 RX ORDER — ASPIRIN 325 MG/1
TABLET, FILM COATED ORAL
Refills: 0 | Status: ACTIVE | COMMUNITY

## 2024-05-07 RX ORDER — CYCLOBENZAPRINE HYDROCHLORIDE 7.5 MG/1
TABLET, FILM COATED ORAL
Refills: 0 | Status: ACTIVE | COMMUNITY

## 2024-05-07 RX ORDER — DICLOFENAC SODIUM 50 MG/1
50 TABLET, DELAYED RELEASE ORAL
Refills: 0 | Status: ACTIVE | COMMUNITY

## 2024-05-07 RX ORDER — ATORVASTATIN CALCIUM 80 MG/1
TABLET, FILM COATED ORAL
Refills: 0 | Status: ACTIVE | COMMUNITY

## 2024-05-07 RX ORDER — ESOMEPRAZOLE MAGNESIUM 40 MG/1
40 CAPSULE, DELAYED RELEASE ORAL
Refills: 0 | Status: ACTIVE | COMMUNITY

## 2024-05-07 RX ORDER — METFORMIN HYDROCHLORIDE 625 MG/1
TABLET ORAL
Refills: 0 | Status: ACTIVE | COMMUNITY

## 2024-05-07 RX ORDER — METOPROLOL TARTRATE 75 MG/1
TABLET, FILM COATED ORAL
Refills: 0 | Status: ACTIVE | COMMUNITY

## 2024-05-17 NOTE — PROGRESS NOTE ADULT - PROBLEM SELECTOR PLAN 7
F: tolerating PO, no IVF  E: replete K<4, Mg<2  N: consistent carb  VTE Prophylaxis: none, start after OR tomorrow  GI: not needed  C: Full Code  D: F no arthritis/no neck pain/no back pain

## 2024-05-18 PROBLEM — T85.192A SPINAL CORD STIMULATOR DYSFUNCTION: Status: ACTIVE | Noted: 2024-05-18

## 2024-05-18 PROBLEM — M54.50 CHRONIC LUMBAR PAIN: Status: ACTIVE | Noted: 2024-05-18

## 2024-05-18 NOTE — HISTORY OF PRESENT ILLNESS
[de-identified] : 59 y/o male w/ PSH of ACDF (C2-C7?, 1994) presenting with chronic lower back pain. Pt referred by Dr. Hernandez for help with SCS revision on 5/24/24. She is planning to place new MRI compatible device. Pt endorses pain radiating into legs bilaterally. He also has weakness in his legs. He has constant numbness in his feel bilaterally. Pt also has morphine pump. He takes oxycodone-tylenol 10mg 5x daily, gabapentin 800mg BID, and flexeril 10mg.  denies recent illness, fevers, balance problems, saddle anesthesia, urinary retention or fecal incontinence.

## 2024-05-18 NOTE — ASSESSMENT
[FreeTextEntry1] : 57 y/o male w/ PSH of ACDF (C2-C7?, 1994) presenting with chronic lower back pain. Pt referred by Dr. Hernandez for help with SCS revision on 5/24/24. She is planning to place new MRI compatible device. Pt endorses pain radiating into legs bilaterally. He also has weakness in his legs. He has constant numbness in his feel bilaterally. Pt also has morphine pump. He takes oxycodone-tylenol 10mg 5x daily, gabapentin 800mg BID, and flexeril 10mg.  denies recent illness, fevers, balance problems, saddle anesthesia, urinary retention or fecal incontinence. Surgery scheduled for SCS revision 5/24/24. Continue medications. We discussed red flag symptoms that would require emergent evaluation.  He knows to call with any questions or concerns or if his symptoms acutely worsen.

## 2024-05-18 NOTE — PHYSICAL EXAM
[de-identified] :  General: No acute distress, conversant, well-nourished. Head: Normocephalic, atraumatic Neck: trachea midline, FROM Heart: normotensive and normal rate and rhythm Lungs: No labored breathing Skin: No abrasions, no rashes, no edema Psych: Alert and oriented to person, place and time Extremities: no peripheral edema or digital cyanosis Gait: Normal gait. Can perform tandem gait.  Vascular: warm and well perfused distally, palpable distal pulses   MSK: Lumbar spine: + tenderness to palpation.  No step-off, no deformity.   NEURO EXAM: Sensation Left L2  -  2/2            Left L3  -  2/2 Left L4  -  1/2 Left L5  -  0/2 Left S1  -  0/2   Right L2  -  2/2            Right L3  -  2/2 Right L4  -  1/2 Right L5  -  0/2 Right S1  -  0/2   Motor: Left L2 (hip flexion)                            5/5                Left L3 (knee extension)                   5/5                Left L4 (ankle dorsiflexion)                 5/5                Left L5 (long toe extensor)                3/5                Left S1 (ankle plantar flexion)           5/5   Right L2 (hip flexion)                            5/5                Right L3 (knee extension)                   5/5                Right L4 (ankle dorsiflexion)                 5/5                Right L5 (long toe extensor)                5/5                Right S1 (ankle plantar flexion)           5/5   Reflexes: Normal and symmetric Negative clonus.  Down-going Babinski. [de-identified] : I ordered radiographs to evaluate the patient's symptoms.  Lumbar 4 view radiographs taken in the office today show no dislocation or fracture.  Lumbar spondylosis.  No instability on dynamic series. SCS and morphine pump in place.

## 2024-05-23 ENCOUNTER — TRANSCRIPTION ENCOUNTER (OUTPATIENT)
Age: 59
End: 2024-05-23

## 2024-05-23 VITALS
HEIGHT: 69 IN | WEIGHT: 253.53 LBS | RESPIRATION RATE: 17 BRPM | OXYGEN SATURATION: 96 % | SYSTOLIC BLOOD PRESSURE: 154 MMHG | HEART RATE: 87 BPM | TEMPERATURE: 97 F | DIASTOLIC BLOOD PRESSURE: 69 MMHG

## 2024-05-23 RX ORDER — CETIRIZINE HYDROCHLORIDE 10 MG/1
1 TABLET ORAL
Qty: 0 | Refills: 0 | DISCHARGE

## 2024-05-23 RX ORDER — ROSUVASTATIN CALCIUM 5 MG/1
1 TABLET ORAL
Qty: 0 | Refills: 0 | DISCHARGE

## 2024-05-23 RX ORDER — FENOFIBRATE,MICRONIZED 130 MG
1 CAPSULE ORAL
Qty: 0 | Refills: 0 | DISCHARGE

## 2024-05-23 RX ORDER — DOCUSATE SODIUM 100 MG
10 CAPSULE ORAL
Qty: 0 | Refills: 0 | DISCHARGE

## 2024-05-23 RX ORDER — ESOMEPRAZOLE MAGNESIUM 40 MG/1
1 CAPSULE, DELAYED RELEASE ORAL
Qty: 0 | Refills: 0 | DISCHARGE

## 2024-05-23 RX ORDER — OXYCODONE AND ACETAMINOPHEN 5; 325 MG/1; MG/1
1 TABLET ORAL
Refills: 0 | DISCHARGE

## 2024-05-23 RX ORDER — MORPHINE SULFATE 50 MG/1
0 CAPSULE, EXTENDED RELEASE ORAL
Qty: 0 | Refills: 0 | DISCHARGE

## 2024-05-23 RX ORDER — METFORMIN HYDROCHLORIDE 850 MG/1
1 TABLET ORAL
Qty: 0 | Refills: 0 | DISCHARGE

## 2024-05-23 RX ORDER — GABAPENTIN 400 MG/1
1 CAPSULE ORAL
Qty: 0 | Refills: 0 | DISCHARGE

## 2024-05-24 ENCOUNTER — INPATIENT (INPATIENT)
Facility: HOSPITAL | Age: 59
LOS: 26 days | Discharge: NURSING, REHAB OR LONG-TERM CARE | DRG: 29 | End: 2024-06-20
Attending: STUDENT IN AN ORGANIZED HEALTH CARE EDUCATION/TRAINING PROGRAM | Admitting: STUDENT IN AN ORGANIZED HEALTH CARE EDUCATION/TRAINING PROGRAM
Payer: MEDICARE

## 2024-05-24 DIAGNOSIS — E83.39 OTHER DISORDERS OF PHOSPHORUS METABOLISM: ICD-10-CM

## 2024-05-24 DIAGNOSIS — G89.18 OTHER ACUTE POSTPROCEDURAL PAIN: ICD-10-CM

## 2024-05-24 DIAGNOSIS — I10 ESSENTIAL (PRIMARY) HYPERTENSION: ICD-10-CM

## 2024-05-24 DIAGNOSIS — J98.11 ATELECTASIS: ICD-10-CM

## 2024-05-24 DIAGNOSIS — R09.02 HYPOXEMIA: ICD-10-CM

## 2024-05-24 DIAGNOSIS — M96.1 POSTLAMINECTOMY SYNDROME, NOT ELSEWHERE CLASSIFIED: ICD-10-CM

## 2024-05-24 DIAGNOSIS — R41.0 DISORIENTATION, UNSPECIFIED: ICD-10-CM
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
## 2024-05-24 DIAGNOSIS — I25.2 OLD MYOCARDIAL INFARCTION: ICD-10-CM

## 2024-05-24 DIAGNOSIS — E87.6 HYPOKALEMIA: ICD-10-CM

## 2024-05-24 DIAGNOSIS — S30.1XXA CONTUSION OF ABDOMINAL WALL, INITIAL ENCOUNTER: ICD-10-CM

## 2024-05-24 DIAGNOSIS — M54.9 DORSALGIA, UNSPECIFIED: ICD-10-CM

## 2024-05-24 DIAGNOSIS — K82.9 DISEASE OF GALLBLADDER, UNSPECIFIED: Chronic | ICD-10-CM

## 2024-05-24 DIAGNOSIS — D84.9 IMMUNODEFICIENCY, UNSPECIFIED: ICD-10-CM

## 2024-05-24 DIAGNOSIS — T85.118A: ICD-10-CM

## 2024-05-24 DIAGNOSIS — Z86.711 PERSONAL HISTORY OF PULMONARY EMBOLISM: ICD-10-CM

## 2024-05-24 DIAGNOSIS — J36 PERITONSILLAR ABSCESS: Chronic | ICD-10-CM

## 2024-05-24 DIAGNOSIS — Z79.84 LONG TERM (CURRENT) USE OF ORAL HYPOGLYCEMIC DRUGS: ICD-10-CM

## 2024-05-24 DIAGNOSIS — D80.1 NONFAMILIAL HYPOGAMMAGLOBULINEMIA: ICD-10-CM

## 2024-05-24 DIAGNOSIS — E83.42 HYPOMAGNESEMIA: ICD-10-CM

## 2024-05-24 DIAGNOSIS — K21.9 GASTRO-ESOPHAGEAL REFLUX DISEASE WITHOUT ESOPHAGITIS: ICD-10-CM

## 2024-05-24 DIAGNOSIS — G89.4 CHRONIC PAIN SYNDROME: ICD-10-CM

## 2024-05-24 DIAGNOSIS — T85.840A PAIN DUE TO NERVOUS SYSTEM PROSTHETIC DEVICES, IMPLANTS AND GRAFTS, INITIAL ENCOUNTER: ICD-10-CM

## 2024-05-24 DIAGNOSIS — R44.0 AUDITORY HALLUCINATIONS: ICD-10-CM

## 2024-05-24 DIAGNOSIS — M45.9 ANKYLOSING SPONDYLITIS OF UNSPECIFIED SITES IN SPINE: ICD-10-CM

## 2024-05-24 DIAGNOSIS — R60.0 LOCALIZED EDEMA: ICD-10-CM

## 2024-05-24 DIAGNOSIS — R07.89 OTHER CHEST PAIN: ICD-10-CM
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
## 2024-05-24 DIAGNOSIS — M48.04 SPINAL STENOSIS, THORACIC REGION: ICD-10-CM

## 2024-05-24 DIAGNOSIS — Z66 DO NOT RESUSCITATE: ICD-10-CM

## 2024-05-24 DIAGNOSIS — Z85.72 PERSONAL HISTORY OF NON-HODGKIN LYMPHOMAS: ICD-10-CM

## 2024-05-24 DIAGNOSIS — M54.16 RADICULOPATHY, LUMBAR REGION: ICD-10-CM

## 2024-05-24 DIAGNOSIS — T85.192A OTHER MECHANICAL COMPLICATION OF IMPLANTED ELECTRONIC NEUROSTIMULATOR OF SPINAL CORD ELECTRODE (LEAD), INITIAL ENCOUNTER: ICD-10-CM

## 2024-05-24 DIAGNOSIS — E78.2 MIXED HYPERLIPIDEMIA: ICD-10-CM

## 2024-05-24 DIAGNOSIS — Z98.1 ARTHRODESIS STATUS: Chronic | ICD-10-CM

## 2024-05-24 DIAGNOSIS — R00.0 TACHYCARDIA, UNSPECIFIED: ICD-10-CM

## 2024-05-24 DIAGNOSIS — Z98.1 ARTHRODESIS STATUS: ICD-10-CM

## 2024-05-24 DIAGNOSIS — R44.1 VISUAL HALLUCINATIONS: ICD-10-CM

## 2024-05-24 DIAGNOSIS — I25.10 ATHEROSCLEROTIC HEART DISEASE OF NATIVE CORONARY ARTERY WITHOUT ANGINA PECTORIS: ICD-10-CM

## 2024-05-24 DIAGNOSIS — R45.851 SUICIDAL IDEATIONS: ICD-10-CM

## 2024-05-24 DIAGNOSIS — E11.9 TYPE 2 DIABETES MELLITUS WITHOUT COMPLICATIONS: ICD-10-CM

## 2024-05-24 DIAGNOSIS — B01.89 OTHER VARICELLA COMPLICATIONS: ICD-10-CM

## 2024-05-24 DIAGNOSIS — F43.20 ADJUSTMENT DISORDER, UNSPECIFIED: ICD-10-CM

## 2024-05-24 DIAGNOSIS — B02.7 DISSEMINATED ZOSTER: ICD-10-CM
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
## 2024-05-24 DIAGNOSIS — D69.6 THROMBOCYTOPENIA, UNSPECIFIED: ICD-10-CM

## 2024-05-24 DIAGNOSIS — Z86.718 PERSONAL HISTORY OF OTHER VENOUS THROMBOSIS AND EMBOLISM: ICD-10-CM

## 2024-05-24 LAB
GLUCOSE BLDC GLUCOMTR-MCNC: 100 MG/DL — HIGH (ref 70–99)
GLUCOSE BLDC GLUCOMTR-MCNC: 116 MG/DL — HIGH (ref 70–99)
GLUCOSE BLDC GLUCOMTR-MCNC: 207 MG/DL — HIGH (ref 70–99)

## 2024-05-24 PROCEDURE — 63662 REMOVE SPINE ELTRD PLATE: CPT

## 2024-05-24 PROCEDURE — 88300 SURGICAL PATH GROSS: CPT | Mod: 26

## 2024-05-24 DEVICE — SYNCROMED II INFUSION PUMP 20CC: Type: IMPLANTABLE DEVICE | Site: BILATERAL | Status: FUNCTIONAL

## 2024-05-24 DEVICE — SURGIFLO HEMOSTATIC MATRIX KIT: Type: IMPLANTABLE DEVICE | Site: BILATERAL | Status: FUNCTIONAL

## 2024-05-24 DEVICE — KIT CATH PUMP INTRATHECAL SEGMENT W/ SUTURELESS CONN: Type: IMPLANTABLE DEVICE | Site: BILATERAL | Status: FUNCTIONAL

## 2024-05-24 DEVICE — LEAD VECTRIS SURESCAN COMPACT 60CM: Type: IMPLANTABLE DEVICE | Site: BILATERAL | Status: FUNCTIONAL

## 2024-05-24 DEVICE — STIM ADAPTIVE RESTORE II INTELLIS SURESCAN MRI: Type: IMPLANTABLE DEVICE | Site: BILATERAL | Status: FUNCTIONAL

## 2024-05-24 RX ORDER — ATORVASTATIN CALCIUM 20 MG/1
40 TABLET, FILM COATED ORAL AT BEDTIME
Refills: 0 | Status: DISCONTINUED | OUTPATIENT
Start: 2024-05-24 | End: 2024-06-20

## 2024-05-24 RX ORDER — CEFAZOLIN 10 G/1
2000 INJECTION, POWDER, FOR SOLUTION INTRAVENOUS EVERY 8 HOURS
Refills: 0 | Status: COMPLETED | OUTPATIENT
Start: 2024-05-24 | End: 2024-05-25

## 2024-05-24 RX ORDER — HYDROMORPHONE HCL 0.2 MG/ML
6 INJECTION, SOLUTION INTRAVENOUS
Refills: 0 | Status: DISCONTINUED | OUTPATIENT
Start: 2024-05-24 | End: 2024-05-25

## 2024-05-24 RX ORDER — LORATADINE 10 MG/1
10 TABLET ORAL DAILY
Refills: 0 | Status: DISCONTINUED | OUTPATIENT
Start: 2024-05-24 | End: 2024-06-20

## 2024-05-24 RX ORDER — RANOLAZINE 500 MG/1
500 TABLET, EXTENDED RELEASE ORAL
Refills: 0 | Status: DISCONTINUED | OUTPATIENT
Start: 2024-05-24 | End: 2024-06-20

## 2024-05-24 RX ORDER — DEXTROSE 30 % IN WATER 30 %
15 VIAL (ML) INTRAVENOUS ONCE
Refills: 0 | Status: DISCONTINUED | OUTPATIENT
Start: 2024-05-24 | End: 2024-06-20

## 2024-05-24 RX ORDER — DEXAMETHASONE 1 MG/1
6 TABLET ORAL EVERY 6 HOURS
Refills: 0 | Status: DISCONTINUED | OUTPATIENT
Start: 2024-05-24 | End: 2024-05-26

## 2024-05-24 RX ORDER — METOPROLOL TARTRATE 50 MG
50 TABLET ORAL
Refills: 0 | Status: DISCONTINUED | OUTPATIENT
Start: 2024-05-24 | End: 2024-06-20

## 2024-05-24 RX ORDER — ONDANSETRON HYDROCHLORIDE 2 MG/ML
4 INJECTION INTRAMUSCULAR; INTRAVENOUS EVERY 6 HOURS
Refills: 0 | Status: DISCONTINUED | OUTPATIENT
Start: 2024-05-24 | End: 2024-06-20

## 2024-05-24 RX ORDER — GLUCAGON HYDROCHLORIDE 1 MG/ML
1 INJECTION, POWDER, FOR SOLUTION INTRAMUSCULAR; INTRAVENOUS; SUBCUTANEOUS ONCE
Refills: 0 | Status: DISCONTINUED | OUTPATIENT
Start: 2024-05-24 | End: 2024-06-20

## 2024-05-24 RX ORDER — MESALAMINE 500 MG/1
250 CAPSULE ORAL
Refills: 0 | Status: DISCONTINUED | OUTPATIENT
Start: 2024-05-24 | End: 2024-06-20

## 2024-05-24 RX ORDER — ISOSORBIDE DINITRATE 5 MG/1
30 TABLET ORAL
Refills: 0 | Status: DISCONTINUED | OUTPATIENT
Start: 2024-05-24 | End: 2024-06-20

## 2024-05-24 RX ORDER — OXYCODONE HYDROCHLORIDE 100 MG/5ML
30 SOLUTION ORAL
Refills: 0 | Status: DISCONTINUED | OUTPATIENT
Start: 2024-05-24 | End: 2024-05-25

## 2024-05-24 RX ORDER — DEXTROSE MONOHYDRATE AND SODIUM CHLORIDE 5; .3 G/100ML; G/100ML
1000 INJECTION, SOLUTION INTRAVENOUS
Refills: 0 | Status: DISCONTINUED | OUTPATIENT
Start: 2024-05-24 | End: 2024-06-20

## 2024-05-24 RX ORDER — DEXTROSE MONOHYDRATE 100 MG/ML
125 INJECTION, SOLUTION INTRAVENOUS ONCE
Refills: 0 | Status: DISCONTINUED | OUTPATIENT
Start: 2024-05-24 | End: 2024-06-20

## 2024-05-24 RX ORDER — SENNOSIDES 8.6 MG
2 TABLET ORAL AT BEDTIME
Refills: 0 | Status: DISCONTINUED | OUTPATIENT
Start: 2024-05-24 | End: 2024-06-20

## 2024-05-24 RX ORDER — INSULIN LISPRO 100 [IU]/ML
INJECTION, SOLUTION SUBCUTANEOUS
Refills: 0 | Status: DISCONTINUED | OUTPATIENT
Start: 2024-05-24 | End: 2024-06-20

## 2024-05-24 RX ORDER — ACETAMINOPHEN 325 MG
650 TABLET ORAL EVERY 6 HOURS
Refills: 0 | Status: DISCONTINUED | OUTPATIENT
Start: 2024-05-24 | End: 2024-05-28

## 2024-05-24 RX ORDER — GABAPENTIN
800 POWDER (GRAM) MISCELLANEOUS THREE TIMES A DAY
Refills: 0 | Status: DISCONTINUED | OUTPATIENT
Start: 2024-05-24 | End: 2024-06-20

## 2024-05-24 RX ORDER — DEXTROSE 30 % IN WATER 30 %
25 VIAL (ML) INTRAVENOUS ONCE
Refills: 0 | Status: DISCONTINUED | OUTPATIENT
Start: 2024-05-24 | End: 2024-06-20

## 2024-05-24 RX ORDER — DEXTROSE MONOHYDRATE AND SODIUM CHLORIDE 5; .3 G/100ML; G/100ML
1000 INJECTION, SOLUTION INTRAVENOUS
Refills: 0 | Status: DISCONTINUED | OUTPATIENT
Start: 2024-05-24 | End: 2024-06-05

## 2024-05-24 RX ORDER — DEXTROSE 30 % IN WATER 30 %
12.5 VIAL (ML) INTRAVENOUS ONCE
Refills: 0 | Status: DISCONTINUED | OUTPATIENT
Start: 2024-05-24 | End: 2024-06-20

## 2024-05-24 RX ORDER — PANTOPRAZOLE SODIUM 40 MG/10ML
40 INJECTION, POWDER, FOR SOLUTION INTRAVENOUS
Refills: 0 | Status: DISCONTINUED | OUTPATIENT
Start: 2024-05-24 | End: 2024-06-20

## 2024-05-24 RX ORDER — ACETAMINOPHEN 325 MG
1000 TABLET ORAL EVERY 8 HOURS
Refills: 0 | Status: DISCONTINUED | OUTPATIENT
Start: 2024-05-24 | End: 2024-06-20

## 2024-05-24 RX ORDER — POLYETHYLENE GLYCOL 3350 1 G/G
17 POWDER ORAL DAILY
Refills: 0 | Status: DISCONTINUED | OUTPATIENT
Start: 2024-05-24 | End: 2024-05-29

## 2024-05-24 RX ORDER — BENZOCAINE AND MENTHOL 15; 3.6 MG/1; MG/1
1 LOZENGE ORAL
Refills: 0 | Status: DISCONTINUED | OUTPATIENT
Start: 2024-05-24 | End: 2024-06-20

## 2024-05-24 RX ADMIN — HYDROMORPHONE HCL 6 MILLIGRAM(S): 0.2 INJECTION, SOLUTION INTRAVENOUS at 22:20

## 2024-05-24 RX ADMIN — Medication 800 MILLIGRAM(S): at 15:35

## 2024-05-24 RX ADMIN — HYDROMORPHONE HCL 6 MILLIGRAM(S): 0.2 INJECTION, SOLUTION INTRAVENOUS at 21:41

## 2024-05-24 RX ADMIN — ATORVASTATIN CALCIUM 40 MILLIGRAM(S): 20 TABLET, FILM COATED ORAL at 23:32

## 2024-05-24 RX ADMIN — Medication 650 MILLIGRAM(S): at 19:12

## 2024-05-24 RX ADMIN — OXYCODONE HYDROCHLORIDE 30 MILLIGRAM(S): 100 SOLUTION ORAL at 18:26

## 2024-05-24 RX ADMIN — INSULIN LISPRO 4: 100 INJECTION, SOLUTION SUBCUTANEOUS at 23:46

## 2024-05-24 RX ADMIN — HYDROMORPHONE HCL 6 MILLIGRAM(S): 0.2 INJECTION, SOLUTION INTRAVENOUS at 17:00

## 2024-05-24 RX ADMIN — MESALAMINE 250 MILLIGRAM(S): 500 CAPSULE ORAL at 18:27

## 2024-05-24 RX ADMIN — Medication 800 MILLIGRAM(S): at 23:32

## 2024-05-24 RX ADMIN — ONDANSETRON HYDROCHLORIDE 4 MILLIGRAM(S): 2 INJECTION INTRAMUSCULAR; INTRAVENOUS at 18:29

## 2024-05-24 RX ADMIN — OXYCODONE HYDROCHLORIDE 30 MILLIGRAM(S): 100 SOLUTION ORAL at 23:32

## 2024-05-24 RX ADMIN — Medication 1000 MILLIGRAM(S): at 23:32

## 2024-05-24 RX ADMIN — HYDROMORPHONE HCL 6 MILLIGRAM(S): 0.2 INJECTION, SOLUTION INTRAVENOUS at 15:21

## 2024-05-24 RX ADMIN — CEFAZOLIN 100 MILLIGRAM(S): 10 INJECTION, POWDER, FOR SOLUTION INTRAVENOUS at 15:35

## 2024-05-24 RX ADMIN — RANOLAZINE 500 MILLIGRAM(S): 500 TABLET, EXTENDED RELEASE ORAL at 18:27

## 2024-05-24 RX ADMIN — HYDROMORPHONE HCL 6 MILLIGRAM(S): 0.2 INJECTION, SOLUTION INTRAVENOUS at 16:24

## 2024-05-24 RX ADMIN — OXYCODONE HYDROCHLORIDE 30 MILLIGRAM(S): 100 SOLUTION ORAL at 14:06

## 2024-05-24 RX ADMIN — OXYCODONE HYDROCHLORIDE 30 MILLIGRAM(S): 100 SOLUTION ORAL at 19:31

## 2024-05-24 RX ADMIN — DEXAMETHASONE 6 MILLIGRAM(S): 1 TABLET ORAL at 18:29

## 2024-05-24 RX ADMIN — OXYCODONE HYDROCHLORIDE 30 MILLIGRAM(S): 100 SOLUTION ORAL at 15:50

## 2024-05-24 RX ADMIN — Medication 50 MILLIGRAM(S): at 18:27

## 2024-05-24 RX ADMIN — DEXTROSE MONOHYDRATE AND SODIUM CHLORIDE 110 MILLILITER(S): 5; .3 INJECTION, SOLUTION INTRAVENOUS at 23:33

## 2024-05-24 RX ADMIN — HYDROMORPHONE HCL 6 MILLIGRAM(S): 0.2 INJECTION, SOLUTION INTRAVENOUS at 13:29

## 2024-05-25 LAB
GLUCOSE BLDC GLUCOMTR-MCNC: 167 MG/DL — HIGH (ref 70–99)
GLUCOSE BLDC GLUCOMTR-MCNC: 188 MG/DL — HIGH (ref 70–99)
GLUCOSE BLDC GLUCOMTR-MCNC: 188 MG/DL — HIGH (ref 70–99)
GLUCOSE BLDC GLUCOMTR-MCNC: 193 MG/DL — HIGH (ref 70–99)

## 2024-05-25 PROCEDURE — 72158 MRI LUMBAR SPINE W/O & W/DYE: CPT | Mod: 26

## 2024-05-25 PROCEDURE — 72157 MRI CHEST SPINE W/O & W/DYE: CPT | Mod: 26

## 2024-05-25 PROCEDURE — 99223 1ST HOSP IP/OBS HIGH 75: CPT

## 2024-05-25 RX ORDER — ONDANSETRON HYDROCHLORIDE 2 MG/ML
4 INJECTION INTRAMUSCULAR; INTRAVENOUS EVERY 6 HOURS
Refills: 0 | Status: DISCONTINUED | OUTPATIENT
Start: 2024-05-25 | End: 2024-06-05

## 2024-05-25 RX ORDER — HYDROMORPHONE HCL 0.2 MG/ML
8 INJECTION, SOLUTION INTRAVENOUS
Refills: 0 | Status: DISCONTINUED | OUTPATIENT
Start: 2024-05-25 | End: 2024-05-25

## 2024-05-25 RX ORDER — HYDROMORPHONE HCL 0.2 MG/ML
8 INJECTION, SOLUTION INTRAVENOUS ONCE
Refills: 0 | Status: DISCONTINUED | OUTPATIENT
Start: 2024-05-25 | End: 2024-05-25

## 2024-05-25 RX ORDER — HYDROMORPHONE HCL 0.2 MG/ML
30 INJECTION, SOLUTION INTRAVENOUS
Refills: 0 | Status: DISCONTINUED | OUTPATIENT
Start: 2024-05-25 | End: 2024-05-28

## 2024-05-25 RX ADMIN — HYDROMORPHONE HCL 6 MILLIGRAM(S): 0.2 INJECTION, SOLUTION INTRAVENOUS at 03:45

## 2024-05-25 RX ADMIN — ONDANSETRON HYDROCHLORIDE 4 MILLIGRAM(S): 2 INJECTION INTRAMUSCULAR; INTRAVENOUS at 23:59

## 2024-05-25 RX ADMIN — Medication 1000 MILLIGRAM(S): at 21:53

## 2024-05-25 RX ADMIN — OXYCODONE HYDROCHLORIDE 30 MILLIGRAM(S): 100 SOLUTION ORAL at 11:54

## 2024-05-25 RX ADMIN — Medication 50 MILLIGRAM(S): at 06:52

## 2024-05-25 RX ADMIN — INSULIN LISPRO 2: 100 INJECTION, SOLUTION SUBCUTANEOUS at 12:48

## 2024-05-25 RX ADMIN — OXYCODONE HYDROCHLORIDE 30 MILLIGRAM(S): 100 SOLUTION ORAL at 00:15

## 2024-05-25 RX ADMIN — HYDROMORPHONE HCL 8 MILLIGRAM(S): 0.2 INJECTION, SOLUTION INTRAVENOUS at 09:10

## 2024-05-25 RX ADMIN — Medication 800 MILLIGRAM(S): at 21:53

## 2024-05-25 RX ADMIN — Medication 2 TABLET(S): at 21:53

## 2024-05-25 RX ADMIN — OXYCODONE HYDROCHLORIDE 30 MILLIGRAM(S): 100 SOLUTION ORAL at 10:54

## 2024-05-25 RX ADMIN — MESALAMINE 250 MILLIGRAM(S): 500 CAPSULE ORAL at 23:59

## 2024-05-25 RX ADMIN — INSULIN LISPRO 2: 100 INJECTION, SOLUTION SUBCUTANEOUS at 17:29

## 2024-05-25 RX ADMIN — OXYCODONE HYDROCHLORIDE 30 MILLIGRAM(S): 100 SOLUTION ORAL at 07:45

## 2024-05-25 RX ADMIN — Medication 800 MILLIGRAM(S): at 13:04

## 2024-05-25 RX ADMIN — MESALAMINE 250 MILLIGRAM(S): 500 CAPSULE ORAL at 13:03

## 2024-05-25 RX ADMIN — Medication 1000 MILLIGRAM(S): at 00:15

## 2024-05-25 RX ADMIN — MESALAMINE 250 MILLIGRAM(S): 500 CAPSULE ORAL at 07:17

## 2024-05-25 RX ADMIN — OXYCODONE HYDROCHLORIDE 30 MILLIGRAM(S): 100 SOLUTION ORAL at 06:52

## 2024-05-25 RX ADMIN — Medication 1000 MILLIGRAM(S): at 13:04

## 2024-05-25 RX ADMIN — ONDANSETRON HYDROCHLORIDE 4 MILLIGRAM(S): 2 INJECTION INTRAMUSCULAR; INTRAVENOUS at 00:08

## 2024-05-25 RX ADMIN — INSULIN LISPRO 2: 100 INJECTION, SOLUTION SUBCUTANEOUS at 22:23

## 2024-05-25 RX ADMIN — HYDROMORPHONE HCL 6 MILLIGRAM(S): 0.2 INJECTION, SOLUTION INTRAVENOUS at 04:35

## 2024-05-25 RX ADMIN — ONDANSETRON HYDROCHLORIDE 4 MILLIGRAM(S): 2 INJECTION INTRAMUSCULAR; INTRAVENOUS at 13:03

## 2024-05-25 RX ADMIN — INSULIN LISPRO 2: 100 INJECTION, SOLUTION SUBCUTANEOUS at 08:21

## 2024-05-25 RX ADMIN — ONDANSETRON HYDROCHLORIDE 4 MILLIGRAM(S): 2 INJECTION INTRAMUSCULAR; INTRAVENOUS at 17:54

## 2024-05-25 RX ADMIN — RANOLAZINE 500 MILLIGRAM(S): 500 TABLET, EXTENDED RELEASE ORAL at 17:53

## 2024-05-25 RX ADMIN — DEXAMETHASONE 6 MILLIGRAM(S): 1 TABLET ORAL at 13:03

## 2024-05-25 RX ADMIN — HYDROMORPHONE HCL 8 MILLIGRAM(S): 0.2 INJECTION, SOLUTION INTRAVENOUS at 18:28

## 2024-05-25 RX ADMIN — HYDROMORPHONE HCL 8 MILLIGRAM(S): 0.2 INJECTION, SOLUTION INTRAVENOUS at 13:44

## 2024-05-25 RX ADMIN — Medication 1000 MILLIGRAM(S): at 06:52

## 2024-05-25 RX ADMIN — MESALAMINE 250 MILLIGRAM(S): 500 CAPSULE ORAL at 17:54

## 2024-05-25 RX ADMIN — Medication 2 TABLET(S): at 00:08

## 2024-05-25 RX ADMIN — PANTOPRAZOLE SODIUM 40 MILLIGRAM(S): 40 INJECTION, POWDER, FOR SOLUTION INTRAVENOUS at 06:52

## 2024-05-25 RX ADMIN — ONDANSETRON HYDROCHLORIDE 4 MILLIGRAM(S): 2 INJECTION INTRAMUSCULAR; INTRAVENOUS at 06:52

## 2024-05-25 RX ADMIN — ATORVASTATIN CALCIUM 40 MILLIGRAM(S): 20 TABLET, FILM COATED ORAL at 21:53

## 2024-05-25 RX ADMIN — LORATADINE 10 MILLIGRAM(S): 10 TABLET ORAL at 13:03

## 2024-05-25 RX ADMIN — DEXAMETHASONE 6 MILLIGRAM(S): 1 TABLET ORAL at 00:08

## 2024-05-25 RX ADMIN — DEXAMETHASONE 6 MILLIGRAM(S): 1 TABLET ORAL at 06:53

## 2024-05-25 RX ADMIN — RANOLAZINE 500 MILLIGRAM(S): 500 TABLET, EXTENDED RELEASE ORAL at 07:17

## 2024-05-25 RX ADMIN — DEXAMETHASONE 6 MILLIGRAM(S): 1 TABLET ORAL at 23:59

## 2024-05-25 RX ADMIN — MESALAMINE 250 MILLIGRAM(S): 500 CAPSULE ORAL at 00:08

## 2024-05-25 RX ADMIN — HYDROMORPHONE HCL 8 MILLIGRAM(S): 0.2 INJECTION, SOLUTION INTRAVENOUS at 13:24

## 2024-05-25 RX ADMIN — HYDROMORPHONE HCL 8 MILLIGRAM(S): 0.2 INJECTION, SOLUTION INTRAVENOUS at 09:30

## 2024-05-25 RX ADMIN — POLYETHYLENE GLYCOL 3350 17 GRAM(S): 1 POWDER ORAL at 13:03

## 2024-05-25 RX ADMIN — Medication 800 MILLIGRAM(S): at 06:52

## 2024-05-25 RX ADMIN — CEFAZOLIN 100 MILLIGRAM(S): 10 INJECTION, POWDER, FOR SOLUTION INTRAVENOUS at 00:11

## 2024-05-25 RX ADMIN — ISOSORBIDE DINITRATE 30 MILLIGRAM(S): 5 TABLET ORAL at 10:54

## 2024-05-25 RX ADMIN — DEXAMETHASONE 6 MILLIGRAM(S): 1 TABLET ORAL at 17:53

## 2024-05-25 RX ADMIN — HYDROMORPHONE HCL 30 MILLILITER(S): 0.2 INJECTION, SOLUTION INTRAVENOUS at 14:48

## 2024-05-26 LAB
A1C WITH ESTIMATED AVERAGE GLUCOSE RESULT: 6.7 % — HIGH (ref 4–5.6)
ALBUMIN SERPL ELPH-MCNC: 3.7 G/DL — SIGNIFICANT CHANGE UP (ref 3.3–5)
ALP SERPL-CCNC: 65 U/L — SIGNIFICANT CHANGE UP (ref 40–120)
ALT FLD-CCNC: 22 U/L — SIGNIFICANT CHANGE UP (ref 10–45)
ANION GAP SERPL CALC-SCNC: 11 MMOL/L — SIGNIFICANT CHANGE UP (ref 5–17)
ANION GAP SERPL CALC-SCNC: 13 MMOL/L — SIGNIFICANT CHANGE UP (ref 5–17)
APTT BLD: 26.3 SEC — SIGNIFICANT CHANGE UP (ref 24.5–35.6)
AST SERPL-CCNC: 30 U/L — SIGNIFICANT CHANGE UP (ref 10–40)
BASOPHILS # BLD AUTO: 0.01 K/UL — SIGNIFICANT CHANGE UP (ref 0–0.2)
BASOPHILS NFR BLD AUTO: 0.1 % — SIGNIFICANT CHANGE UP (ref 0–2)
BILIRUB SERPL-MCNC: 0.5 MG/DL — SIGNIFICANT CHANGE UP (ref 0.2–1.2)
BUN SERPL-MCNC: 14 MG/DL — SIGNIFICANT CHANGE UP (ref 7–23)
BUN SERPL-MCNC: 16 MG/DL — SIGNIFICANT CHANGE UP (ref 7–23)
CALCIUM SERPL-MCNC: 8.8 MG/DL — SIGNIFICANT CHANGE UP (ref 8.4–10.5)
CALCIUM SERPL-MCNC: 8.9 MG/DL — SIGNIFICANT CHANGE UP (ref 8.4–10.5)
CHLORIDE SERPL-SCNC: 95 MMOL/L — LOW (ref 96–108)
CHLORIDE SERPL-SCNC: 97 MMOL/L — SIGNIFICANT CHANGE UP (ref 96–108)
CK MB CFR SERPL CALC: 3.9 NG/ML — SIGNIFICANT CHANGE UP (ref 0–6.7)
CK SERPL-CCNC: 876 U/L — HIGH (ref 30–200)
CO2 SERPL-SCNC: 25 MMOL/L — SIGNIFICANT CHANGE UP (ref 22–31)
CO2 SERPL-SCNC: 26 MMOL/L — SIGNIFICANT CHANGE UP (ref 22–31)
CREAT SERPL-MCNC: 0.49 MG/DL — LOW (ref 0.5–1.3)
CREAT SERPL-MCNC: 0.53 MG/DL — SIGNIFICANT CHANGE UP (ref 0.5–1.3)
D DIMER BLD IA.RAPID-MCNC: 243 NG/ML DDU — HIGH
EGFR: 117 ML/MIN/1.73M2 — SIGNIFICANT CHANGE UP
EGFR: 120 ML/MIN/1.73M2 — SIGNIFICANT CHANGE UP
EOSINOPHIL # BLD AUTO: 0 K/UL — SIGNIFICANT CHANGE UP (ref 0–0.5)
EOSINOPHIL NFR BLD AUTO: 0 % — SIGNIFICANT CHANGE UP (ref 0–6)
ESTIMATED AVERAGE GLUCOSE: 146 MG/DL — HIGH (ref 68–114)
GLUCOSE BLDC GLUCOMTR-MCNC: 169 MG/DL — HIGH (ref 70–99)
GLUCOSE BLDC GLUCOMTR-MCNC: 178 MG/DL — HIGH (ref 70–99)
GLUCOSE BLDC GLUCOMTR-MCNC: 198 MG/DL — HIGH (ref 70–99)
GLUCOSE BLDC GLUCOMTR-MCNC: 215 MG/DL — HIGH (ref 70–99)
GLUCOSE SERPL-MCNC: 158 MG/DL — HIGH (ref 70–99)
GLUCOSE SERPL-MCNC: 253 MG/DL — HIGH (ref 70–99)
HCT VFR BLD CALC: 38.6 % — LOW (ref 39–50)
HGB BLD-MCNC: 12.8 G/DL — LOW (ref 13–17)
IMM GRANULOCYTES NFR BLD AUTO: 0.8 % — SIGNIFICANT CHANGE UP (ref 0–0.9)
INR BLD: 1.05 — SIGNIFICANT CHANGE UP (ref 0.85–1.18)
LYMPHOCYTES # BLD AUTO: 1.95 K/UL — SIGNIFICANT CHANGE UP (ref 1–3.3)
LYMPHOCYTES # BLD AUTO: 11.6 % — LOW (ref 13–44)
MAGNESIUM SERPL-MCNC: 1.7 MG/DL — SIGNIFICANT CHANGE UP (ref 1.6–2.6)
MCHC RBC-ENTMCNC: 28.6 PG — SIGNIFICANT CHANGE UP (ref 27–34)
MCHC RBC-ENTMCNC: 33.2 GM/DL — SIGNIFICANT CHANGE UP (ref 32–36)
MCV RBC AUTO: 86.2 FL — SIGNIFICANT CHANGE UP (ref 80–100)
MONOCYTES # BLD AUTO: 0.84 K/UL — SIGNIFICANT CHANGE UP (ref 0–0.9)
MONOCYTES NFR BLD AUTO: 5 % — SIGNIFICANT CHANGE UP (ref 2–14)
NEUTROPHILS # BLD AUTO: 13.84 K/UL — HIGH (ref 1.8–7.4)
NEUTROPHILS NFR BLD AUTO: 82.5 % — HIGH (ref 43–77)
NRBC # BLD: 0 /100 WBCS — SIGNIFICANT CHANGE UP (ref 0–0)
PLATELET # BLD AUTO: 163 K/UL — SIGNIFICANT CHANGE UP (ref 150–400)
POTASSIUM SERPL-MCNC: 4 MMOL/L — SIGNIFICANT CHANGE UP (ref 3.5–5.3)
POTASSIUM SERPL-MCNC: 4.3 MMOL/L — SIGNIFICANT CHANGE UP (ref 3.5–5.3)
POTASSIUM SERPL-SCNC: 4 MMOL/L — SIGNIFICANT CHANGE UP (ref 3.5–5.3)
POTASSIUM SERPL-SCNC: 4.3 MMOL/L — SIGNIFICANT CHANGE UP (ref 3.5–5.3)
PROT SERPL-MCNC: 6.8 G/DL — SIGNIFICANT CHANGE UP (ref 6–8.3)
PROTHROM AB SERPL-ACNC: 11.9 SEC — SIGNIFICANT CHANGE UP (ref 9.5–13)
RBC # BLD: 4.48 M/UL — SIGNIFICANT CHANGE UP (ref 4.2–5.8)
RBC # FLD: 13.4 % — SIGNIFICANT CHANGE UP (ref 10.3–14.5)
SODIUM SERPL-SCNC: 132 MMOL/L — LOW (ref 135–145)
SODIUM SERPL-SCNC: 135 MMOL/L — SIGNIFICANT CHANGE UP (ref 135–145)
TROPONIN T, HIGH SENSITIVITY RESULT: 6 NG/L — SIGNIFICANT CHANGE UP (ref 0–51)
TROPONIN T, HIGH SENSITIVITY RESULT: 7 NG/L — SIGNIFICANT CHANGE UP (ref 0–51)
TSH SERPL-MCNC: 0.18 UIU/ML — LOW (ref 0.27–4.2)
WBC # BLD: 16.78 K/UL — HIGH (ref 3.8–10.5)
WBC # FLD AUTO: 16.78 K/UL — HIGH (ref 3.8–10.5)

## 2024-05-26 PROCEDURE — 99233 SBSQ HOSP IP/OBS HIGH 50: CPT

## 2024-05-26 PROCEDURE — 72128 CT CHEST SPINE W/O DYE: CPT | Mod: 26

## 2024-05-26 PROCEDURE — 72131 CT LUMBAR SPINE W/O DYE: CPT | Mod: 26

## 2024-05-26 RX ORDER — ENOXAPARIN SODIUM 100 MG/ML
40 INJECTION SUBCUTANEOUS EVERY 24 HOURS
Refills: 0 | Status: DISCONTINUED | OUTPATIENT
Start: 2024-05-26 | End: 2024-05-27

## 2024-05-26 RX ORDER — DEXAMETHASONE 1 MG/1
6 TABLET ORAL EVERY 6 HOURS
Refills: 0 | Status: DISCONTINUED | OUTPATIENT
Start: 2024-05-26 | End: 2024-06-03

## 2024-05-26 RX ORDER — BISACODYL 5 MG
5 TABLET, DELAYED RELEASE (ENTERIC COATED) ORAL AT BEDTIME
Refills: 0 | Status: DISCONTINUED | OUTPATIENT
Start: 2024-05-26 | End: 2024-05-29

## 2024-05-26 RX ORDER — ENOXAPARIN SODIUM 100 MG/ML
40 INJECTION SUBCUTANEOUS EVERY 24 HOURS
Refills: 0 | Status: DISCONTINUED | OUTPATIENT
Start: 2024-05-26 | End: 2024-05-26

## 2024-05-26 RX ORDER — ASPIRIN 325 MG/1
81 TABLET, FILM COATED ORAL
Refills: 0 | Status: DISCONTINUED | OUTPATIENT
Start: 2024-05-27 | End: 2024-05-27

## 2024-05-26 RX ADMIN — POLYETHYLENE GLYCOL 3350 17 GRAM(S): 1 POWDER ORAL at 13:11

## 2024-05-26 RX ADMIN — INSULIN LISPRO 2: 100 INJECTION, SOLUTION SUBCUTANEOUS at 18:12

## 2024-05-26 RX ADMIN — INSULIN LISPRO 2: 100 INJECTION, SOLUTION SUBCUTANEOUS at 22:55

## 2024-05-26 RX ADMIN — Medication 10 MILLIGRAM(S): at 21:33

## 2024-05-26 RX ADMIN — Medication 2 TABLET(S): at 21:33

## 2024-05-26 RX ADMIN — ONDANSETRON HYDROCHLORIDE 4 MILLIGRAM(S): 2 INJECTION INTRAMUSCULAR; INTRAVENOUS at 18:14

## 2024-05-26 RX ADMIN — RANOLAZINE 500 MILLIGRAM(S): 500 TABLET, EXTENDED RELEASE ORAL at 18:14

## 2024-05-26 RX ADMIN — LORATADINE 10 MILLIGRAM(S): 10 TABLET ORAL at 13:14

## 2024-05-26 RX ADMIN — ONDANSETRON HYDROCHLORIDE 4 MILLIGRAM(S): 2 INJECTION INTRAMUSCULAR; INTRAVENOUS at 05:45

## 2024-05-26 RX ADMIN — Medication 1000 MILLIGRAM(S): at 05:45

## 2024-05-26 RX ADMIN — Medication 800 MILLIGRAM(S): at 21:33

## 2024-05-26 RX ADMIN — RANOLAZINE 500 MILLIGRAM(S): 500 TABLET, EXTENDED RELEASE ORAL at 05:45

## 2024-05-26 RX ADMIN — DEXAMETHASONE 6 MILLIGRAM(S): 1 TABLET ORAL at 13:13

## 2024-05-26 RX ADMIN — MESALAMINE 250 MILLIGRAM(S): 500 CAPSULE ORAL at 05:44

## 2024-05-26 RX ADMIN — ISOSORBIDE DINITRATE 30 MILLIGRAM(S): 5 TABLET ORAL at 10:30

## 2024-05-26 RX ADMIN — Medication 1000 MILLIGRAM(S): at 21:33

## 2024-05-26 RX ADMIN — ONDANSETRON HYDROCHLORIDE 4 MILLIGRAM(S): 2 INJECTION INTRAMUSCULAR; INTRAVENOUS at 13:13

## 2024-05-26 RX ADMIN — MESALAMINE 250 MILLIGRAM(S): 500 CAPSULE ORAL at 13:12

## 2024-05-26 RX ADMIN — MESALAMINE 250 MILLIGRAM(S): 500 CAPSULE ORAL at 18:14

## 2024-05-26 RX ADMIN — INSULIN LISPRO 4: 100 INJECTION, SOLUTION SUBCUTANEOUS at 12:45

## 2024-05-26 RX ADMIN — Medication 50 MILLIGRAM(S): at 18:14

## 2024-05-26 RX ADMIN — DEXAMETHASONE 6 MILLIGRAM(S): 1 TABLET ORAL at 05:44

## 2024-05-26 RX ADMIN — DEXAMETHASONE 6 MILLIGRAM(S): 1 TABLET ORAL at 18:13

## 2024-05-26 RX ADMIN — Medication 800 MILLIGRAM(S): at 13:32

## 2024-05-26 RX ADMIN — INSULIN LISPRO 2: 100 INJECTION, SOLUTION SUBCUTANEOUS at 07:39

## 2024-05-26 RX ADMIN — Medication 800 MILLIGRAM(S): at 05:45

## 2024-05-26 RX ADMIN — PANTOPRAZOLE SODIUM 40 MILLIGRAM(S): 40 INJECTION, POWDER, FOR SOLUTION INTRAVENOUS at 05:45

## 2024-05-26 RX ADMIN — Medication 1000 MILLIGRAM(S): at 13:13

## 2024-05-26 RX ADMIN — HYDROMORPHONE HCL 30 MILLILITER(S): 0.2 INJECTION, SOLUTION INTRAVENOUS at 12:23

## 2024-05-26 RX ADMIN — ATORVASTATIN CALCIUM 40 MILLIGRAM(S): 20 TABLET, FILM COATED ORAL at 21:33

## 2024-05-27 LAB
ADD ON TEST-SPECIMEN IN LAB: SIGNIFICANT CHANGE UP
ANION GAP SERPL CALC-SCNC: 14 MMOL/L — SIGNIFICANT CHANGE UP (ref 5–17)
BASOPHILS # BLD AUTO: 0.01 K/UL — SIGNIFICANT CHANGE UP (ref 0–0.2)
BASOPHILS NFR BLD AUTO: 0.1 % — SIGNIFICANT CHANGE UP (ref 0–2)
BUN SERPL-MCNC: 15 MG/DL — SIGNIFICANT CHANGE UP (ref 7–23)
CALCIUM SERPL-MCNC: 8.4 MG/DL — SIGNIFICANT CHANGE UP (ref 8.4–10.5)
CHLORIDE SERPL-SCNC: 97 MMOL/L — SIGNIFICANT CHANGE UP (ref 96–108)
CO2 SERPL-SCNC: 25 MMOL/L — SIGNIFICANT CHANGE UP (ref 22–31)
CREAT SERPL-MCNC: 0.52 MG/DL — SIGNIFICANT CHANGE UP (ref 0.5–1.3)
EGFR: 118 ML/MIN/1.73M2 — SIGNIFICANT CHANGE UP
EOSINOPHIL # BLD AUTO: 0 K/UL — SIGNIFICANT CHANGE UP (ref 0–0.5)
EOSINOPHIL NFR BLD AUTO: 0 % — SIGNIFICANT CHANGE UP (ref 0–6)
GLUCOSE BLDC GLUCOMTR-MCNC: 152 MG/DL — HIGH (ref 70–99)
GLUCOSE BLDC GLUCOMTR-MCNC: 158 MG/DL — HIGH (ref 70–99)
GLUCOSE BLDC GLUCOMTR-MCNC: 167 MG/DL — HIGH (ref 70–99)
GLUCOSE BLDC GLUCOMTR-MCNC: 192 MG/DL — HIGH (ref 70–99)
GLUCOSE SERPL-MCNC: 170 MG/DL — HIGH (ref 70–99)
HCT VFR BLD CALC: 35 % — LOW (ref 39–50)
HGB BLD-MCNC: 11.7 G/DL — LOW (ref 13–17)
IMM GRANULOCYTES NFR BLD AUTO: 1.2 % — HIGH (ref 0–0.9)
LYMPHOCYTES # BLD AUTO: 1.53 K/UL — SIGNIFICANT CHANGE UP (ref 1–3.3)
LYMPHOCYTES # BLD AUTO: 13.9 % — SIGNIFICANT CHANGE UP (ref 13–44)
MCHC RBC-ENTMCNC: 28.7 PG — SIGNIFICANT CHANGE UP (ref 27–34)
MCHC RBC-ENTMCNC: 33.4 GM/DL — SIGNIFICANT CHANGE UP (ref 32–36)
MCV RBC AUTO: 86 FL — SIGNIFICANT CHANGE UP (ref 80–100)
MONOCYTES # BLD AUTO: 0.61 K/UL — SIGNIFICANT CHANGE UP (ref 0–0.9)
MONOCYTES NFR BLD AUTO: 5.6 % — SIGNIFICANT CHANGE UP (ref 2–14)
NEUTROPHILS # BLD AUTO: 8.7 K/UL — HIGH (ref 1.8–7.4)
NEUTROPHILS NFR BLD AUTO: 79.2 % — HIGH (ref 43–77)
NRBC # BLD: 0 /100 WBCS — SIGNIFICANT CHANGE UP (ref 0–0)
PLATELET # BLD AUTO: 144 K/UL — LOW (ref 150–400)
POTASSIUM SERPL-MCNC: 4.2 MMOL/L — SIGNIFICANT CHANGE UP (ref 3.5–5.3)
POTASSIUM SERPL-SCNC: 4.2 MMOL/L — SIGNIFICANT CHANGE UP (ref 3.5–5.3)
RBC # BLD: 4.07 M/UL — LOW (ref 4.2–5.8)
RBC # FLD: 13.5 % — SIGNIFICANT CHANGE UP (ref 10.3–14.5)
SODIUM SERPL-SCNC: 136 MMOL/L — SIGNIFICANT CHANGE UP (ref 135–145)
T4 FREE SERPL-MCNC: 1.62 NG/DL — SIGNIFICANT CHANGE UP (ref 0.93–1.7)
TROPONIN T, HIGH SENSITIVITY RESULT: 8 NG/L — SIGNIFICANT CHANGE UP (ref 0–51)
WBC # BLD: 10.98 K/UL — HIGH (ref 3.8–10.5)
WBC # FLD AUTO: 10.98 K/UL — HIGH (ref 3.8–10.5)

## 2024-05-27 PROCEDURE — 99232 SBSQ HOSP IP/OBS MODERATE 35: CPT

## 2024-05-27 RX ORDER — ENOXAPARIN SODIUM 100 MG/ML
40 INJECTION SUBCUTANEOUS EVERY 12 HOURS
Refills: 0 | Status: DISCONTINUED | OUTPATIENT
Start: 2024-05-27 | End: 2024-06-20

## 2024-05-27 RX ORDER — DIAZEPAM 10 MG/1
2 TABLET ORAL EVERY 8 HOURS
Refills: 0 | Status: DISCONTINUED | OUTPATIENT
Start: 2024-05-27 | End: 2024-05-28

## 2024-05-27 RX ORDER — ASPIRIN 325 MG/1
81 TABLET, FILM COATED ORAL DAILY
Refills: 0 | Status: DISCONTINUED | OUTPATIENT
Start: 2024-05-27 | End: 2024-06-20

## 2024-05-27 RX ORDER — INSULIN GLARGINE 100 [IU]/ML
4 INJECTION, SOLUTION SUBCUTANEOUS AT BEDTIME
Refills: 0 | Status: DISCONTINUED | OUTPATIENT
Start: 2024-05-27 | End: 2024-05-29

## 2024-05-27 RX ADMIN — ASPIRIN 81 MILLIGRAM(S): 325 TABLET, FILM COATED ORAL at 05:18

## 2024-05-27 RX ADMIN — DEXAMETHASONE 6 MILLIGRAM(S): 1 TABLET ORAL at 13:51

## 2024-05-27 RX ADMIN — PANTOPRAZOLE SODIUM 40 MILLIGRAM(S): 40 INJECTION, POWDER, FOR SOLUTION INTRAVENOUS at 05:18

## 2024-05-27 RX ADMIN — INSULIN GLARGINE 4 UNIT(S): 100 INJECTION, SOLUTION SUBCUTANEOUS at 22:24

## 2024-05-27 RX ADMIN — ONDANSETRON HYDROCHLORIDE 4 MILLIGRAM(S): 2 INJECTION INTRAMUSCULAR; INTRAVENOUS at 18:30

## 2024-05-27 RX ADMIN — POLYETHYLENE GLYCOL 3350 17 GRAM(S): 1 POWDER ORAL at 14:07

## 2024-05-27 RX ADMIN — ONDANSETRON HYDROCHLORIDE 4 MILLIGRAM(S): 2 INJECTION INTRAMUSCULAR; INTRAVENOUS at 00:02

## 2024-05-27 RX ADMIN — MESALAMINE 250 MILLIGRAM(S): 500 CAPSULE ORAL at 00:02

## 2024-05-27 RX ADMIN — DEXAMETHASONE 6 MILLIGRAM(S): 1 TABLET ORAL at 05:19

## 2024-05-27 RX ADMIN — INSULIN LISPRO 2: 100 INJECTION, SOLUTION SUBCUTANEOUS at 22:24

## 2024-05-27 RX ADMIN — MESALAMINE 250 MILLIGRAM(S): 500 CAPSULE ORAL at 13:48

## 2024-05-27 RX ADMIN — RANOLAZINE 500 MILLIGRAM(S): 500 TABLET, EXTENDED RELEASE ORAL at 18:30

## 2024-05-27 RX ADMIN — HYDROMORPHONE HCL 30 MILLILITER(S): 0.2 INJECTION, SOLUTION INTRAVENOUS at 11:18

## 2024-05-27 RX ADMIN — Medication 1000 MILLIGRAM(S): at 05:17

## 2024-05-27 RX ADMIN — Medication 800 MILLIGRAM(S): at 21:56

## 2024-05-27 RX ADMIN — Medication 800 MILLIGRAM(S): at 13:48

## 2024-05-27 RX ADMIN — ONDANSETRON HYDROCHLORIDE 4 MILLIGRAM(S): 2 INJECTION INTRAMUSCULAR; INTRAVENOUS at 05:19

## 2024-05-27 RX ADMIN — Medication 800 MILLIGRAM(S): at 05:18

## 2024-05-27 RX ADMIN — DEXAMETHASONE 6 MILLIGRAM(S): 1 TABLET ORAL at 00:02

## 2024-05-27 RX ADMIN — Medication 5 MILLIGRAM(S): at 00:27

## 2024-05-27 RX ADMIN — Medication 50 MILLIGRAM(S): at 05:18

## 2024-05-27 RX ADMIN — Medication 50 MILLIGRAM(S): at 18:30

## 2024-05-27 RX ADMIN — LORATADINE 10 MILLIGRAM(S): 10 TABLET ORAL at 13:47

## 2024-05-27 RX ADMIN — ENOXAPARIN SODIUM 40 MILLIGRAM(S): 100 INJECTION SUBCUTANEOUS at 18:32

## 2024-05-27 RX ADMIN — ISOSORBIDE DINITRATE 30 MILLIGRAM(S): 5 TABLET ORAL at 13:47

## 2024-05-27 RX ADMIN — DEXAMETHASONE 6 MILLIGRAM(S): 1 TABLET ORAL at 18:30

## 2024-05-27 RX ADMIN — MESALAMINE 250 MILLIGRAM(S): 500 CAPSULE ORAL at 18:29

## 2024-05-27 RX ADMIN — Medication 10 MILLIGRAM(S): at 05:18

## 2024-05-27 RX ADMIN — Medication 1000 MILLIGRAM(S): at 21:56

## 2024-05-27 RX ADMIN — Medication 10 MILLIGRAM(S): at 14:06

## 2024-05-27 RX ADMIN — ATORVASTATIN CALCIUM 40 MILLIGRAM(S): 20 TABLET, FILM COATED ORAL at 21:56

## 2024-05-27 RX ADMIN — Medication 10 MILLIGRAM(S): at 21:56

## 2024-05-27 RX ADMIN — Medication 2 TABLET(S): at 21:56

## 2024-05-27 RX ADMIN — INSULIN LISPRO 2: 100 INJECTION, SOLUTION SUBCUTANEOUS at 07:07

## 2024-05-27 RX ADMIN — INSULIN LISPRO 2: 100 INJECTION, SOLUTION SUBCUTANEOUS at 18:09

## 2024-05-27 RX ADMIN — Medication 5 MILLIGRAM(S): at 21:56

## 2024-05-27 RX ADMIN — Medication 1000 MILLIGRAM(S): at 13:48

## 2024-05-27 RX ADMIN — RANOLAZINE 500 MILLIGRAM(S): 500 TABLET, EXTENDED RELEASE ORAL at 05:18

## 2024-05-27 RX ADMIN — INSULIN LISPRO 2: 100 INJECTION, SOLUTION SUBCUTANEOUS at 13:49

## 2024-05-27 RX ADMIN — ASPIRIN 81 MILLIGRAM(S): 325 TABLET, FILM COATED ORAL at 13:56

## 2024-05-27 RX ADMIN — MESALAMINE 250 MILLIGRAM(S): 500 CAPSULE ORAL at 05:18

## 2024-05-27 RX ADMIN — ONDANSETRON HYDROCHLORIDE 4 MILLIGRAM(S): 2 INJECTION INTRAMUSCULAR; INTRAVENOUS at 13:48

## 2024-05-28 DIAGNOSIS — E78.5 HYPERLIPIDEMIA, UNSPECIFIED: ICD-10-CM

## 2024-05-28 DIAGNOSIS — K50.90 CROHN'S DISEASE, UNSPECIFIED, WITHOUT COMPLICATIONS: ICD-10-CM

## 2024-05-28 DIAGNOSIS — I25.10 ATHEROSCLEROTIC HEART DISEASE OF NATIVE CORONARY ARTERY WITHOUT ANGINA PECTORIS: ICD-10-CM

## 2024-05-28 DIAGNOSIS — I10 ESSENTIAL (PRIMARY) HYPERTENSION: ICD-10-CM

## 2024-05-28 DIAGNOSIS — R41.0 DISORIENTATION, UNSPECIFIED: ICD-10-CM

## 2024-05-28 DIAGNOSIS — E11.9 TYPE 2 DIABETES MELLITUS WITHOUT COMPLICATIONS: ICD-10-CM

## 2024-05-28 DIAGNOSIS — R45.851 SUICIDAL IDEATIONS: ICD-10-CM

## 2024-05-28 LAB
ANION GAP SERPL CALC-SCNC: 11 MMOL/L — SIGNIFICANT CHANGE UP (ref 5–17)
BUN SERPL-MCNC: 17 MG/DL — SIGNIFICANT CHANGE UP (ref 7–23)
CALCIUM SERPL-MCNC: 8.5 MG/DL — SIGNIFICANT CHANGE UP (ref 8.4–10.5)
CHLORIDE SERPL-SCNC: 100 MMOL/L — SIGNIFICANT CHANGE UP (ref 96–108)
CO2 SERPL-SCNC: 28 MMOL/L — SIGNIFICANT CHANGE UP (ref 22–31)
CREAT SERPL-MCNC: 0.54 MG/DL — SIGNIFICANT CHANGE UP (ref 0.5–1.3)
EGFR: 116 ML/MIN/1.73M2 — SIGNIFICANT CHANGE UP
GLUCOSE BLDC GLUCOMTR-MCNC: 159 MG/DL — HIGH (ref 70–99)
GLUCOSE BLDC GLUCOMTR-MCNC: 164 MG/DL — HIGH (ref 70–99)
GLUCOSE BLDC GLUCOMTR-MCNC: 167 MG/DL — HIGH (ref 70–99)
GLUCOSE BLDC GLUCOMTR-MCNC: 197 MG/DL — HIGH (ref 70–99)
GLUCOSE SERPL-MCNC: 172 MG/DL — HIGH (ref 70–99)
HCT VFR BLD CALC: 36.1 % — LOW (ref 39–50)
HGB BLD-MCNC: 12.3 G/DL — LOW (ref 13–17)
MCHC RBC-ENTMCNC: 29 PG — SIGNIFICANT CHANGE UP (ref 27–34)
MCHC RBC-ENTMCNC: 34.1 GM/DL — SIGNIFICANT CHANGE UP (ref 32–36)
MCV RBC AUTO: 85.1 FL — SIGNIFICANT CHANGE UP (ref 80–100)
NRBC # BLD: 0 /100 WBCS — SIGNIFICANT CHANGE UP (ref 0–0)
PLATELET # BLD AUTO: 160 K/UL — SIGNIFICANT CHANGE UP (ref 150–400)
POTASSIUM SERPL-MCNC: 4.3 MMOL/L — SIGNIFICANT CHANGE UP (ref 3.5–5.3)
POTASSIUM SERPL-SCNC: 4.3 MMOL/L — SIGNIFICANT CHANGE UP (ref 3.5–5.3)
RBC # BLD: 4.24 M/UL — SIGNIFICANT CHANGE UP (ref 4.2–5.8)
RBC # FLD: 13.6 % — SIGNIFICANT CHANGE UP (ref 10.3–14.5)
SODIUM SERPL-SCNC: 139 MMOL/L — SIGNIFICANT CHANGE UP (ref 135–145)
SURGICAL PATHOLOGY STUDY: SIGNIFICANT CHANGE UP
WBC # BLD: 9.7 K/UL — SIGNIFICANT CHANGE UP (ref 3.8–10.5)
WBC # FLD AUTO: 9.7 K/UL — SIGNIFICANT CHANGE UP (ref 3.8–10.5)

## 2024-05-28 PROCEDURE — 99223 1ST HOSP IP/OBS HIGH 75: CPT

## 2024-05-28 PROCEDURE — 74018 RADEX ABDOMEN 1 VIEW: CPT | Mod: 26

## 2024-05-28 PROCEDURE — 99233 SBSQ HOSP IP/OBS HIGH 50: CPT

## 2024-05-28 RX ORDER — DIAZEPAM 10 MG/1
2 TABLET ORAL ONCE
Refills: 0 | Status: DISCONTINUED | OUTPATIENT
Start: 2024-05-28 | End: 2024-05-28

## 2024-05-28 RX ORDER — ACETAMINOPHEN 325 MG
650 TABLET ORAL EVERY 6 HOURS
Refills: 0 | Status: DISCONTINUED | OUTPATIENT
Start: 2024-05-28 | End: 2024-05-28

## 2024-05-28 RX ORDER — HYDROMORPHONE HCL 0.2 MG/ML
30 INJECTION, SOLUTION INTRAVENOUS
Refills: 0 | Status: DISCONTINUED | OUTPATIENT
Start: 2024-05-28 | End: 2024-05-29

## 2024-05-28 RX ORDER — NALOXEGOL OXALATE 25 MG/1
25 TABLET, FILM COATED ORAL ONCE
Refills: 0 | Status: COMPLETED | OUTPATIENT
Start: 2024-05-28 | End: 2024-05-28

## 2024-05-28 RX ORDER — DIAZEPAM 10 MG/1
5 TABLET ORAL EVERY 8 HOURS
Refills: 0 | Status: DISCONTINUED | OUTPATIENT
Start: 2024-05-28 | End: 2024-06-04

## 2024-05-28 RX ORDER — HYDROMORPHONE HCL 0.2 MG/ML
2 INJECTION, SOLUTION INTRAVENOUS EVERY 4 HOURS
Refills: 0 | Status: DISCONTINUED | OUTPATIENT
Start: 2024-05-28 | End: 2024-06-03

## 2024-05-28 RX ADMIN — DEXAMETHASONE 6 MILLIGRAM(S): 1 TABLET ORAL at 06:14

## 2024-05-28 RX ADMIN — MESALAMINE 250 MILLIGRAM(S): 500 CAPSULE ORAL at 23:55

## 2024-05-28 RX ADMIN — INSULIN LISPRO 2: 100 INJECTION, SOLUTION SUBCUTANEOUS at 07:06

## 2024-05-28 RX ADMIN — ONDANSETRON HYDROCHLORIDE 4 MILLIGRAM(S): 2 INJECTION INTRAMUSCULAR; INTRAVENOUS at 06:14

## 2024-05-28 RX ADMIN — DIAZEPAM 2 MILLIGRAM(S): 10 TABLET ORAL at 00:08

## 2024-05-28 RX ADMIN — Medication 1000 MILLIGRAM(S): at 06:14

## 2024-05-28 RX ADMIN — DIAZEPAM 2 MILLIGRAM(S): 10 TABLET ORAL at 07:06

## 2024-05-28 RX ADMIN — Medication 800 MILLIGRAM(S): at 06:14

## 2024-05-28 RX ADMIN — HYDROMORPHONE HCL 30 MILLILITER(S): 0.2 INJECTION, SOLUTION INTRAVENOUS at 12:34

## 2024-05-28 RX ADMIN — ONDANSETRON HYDROCHLORIDE 4 MILLIGRAM(S): 2 INJECTION INTRAMUSCULAR; INTRAVENOUS at 00:08

## 2024-05-28 RX ADMIN — DIAZEPAM 5 MILLIGRAM(S): 10 TABLET ORAL at 15:24

## 2024-05-28 RX ADMIN — ONDANSETRON HYDROCHLORIDE 4 MILLIGRAM(S): 2 INJECTION INTRAMUSCULAR; INTRAVENOUS at 13:49

## 2024-05-28 RX ADMIN — Medication 10 MILLIGRAM(S): at 05:17

## 2024-05-28 RX ADMIN — ASPIRIN 81 MILLIGRAM(S): 325 TABLET, FILM COATED ORAL at 13:48

## 2024-05-28 RX ADMIN — DEXAMETHASONE 6 MILLIGRAM(S): 1 TABLET ORAL at 23:55

## 2024-05-28 RX ADMIN — RANOLAZINE 500 MILLIGRAM(S): 500 TABLET, EXTENDED RELEASE ORAL at 18:15

## 2024-05-28 RX ADMIN — MESALAMINE 250 MILLIGRAM(S): 500 CAPSULE ORAL at 06:13

## 2024-05-28 RX ADMIN — ENOXAPARIN SODIUM 40 MILLIGRAM(S): 100 INJECTION SUBCUTANEOUS at 18:15

## 2024-05-28 RX ADMIN — INSULIN LISPRO 2: 100 INJECTION, SOLUTION SUBCUTANEOUS at 15:36

## 2024-05-28 RX ADMIN — DIAZEPAM 5 MILLIGRAM(S): 10 TABLET ORAL at 22:13

## 2024-05-28 RX ADMIN — Medication 1000 MILLIGRAM(S): at 21:45

## 2024-05-28 RX ADMIN — MESALAMINE 250 MILLIGRAM(S): 500 CAPSULE ORAL at 18:15

## 2024-05-28 RX ADMIN — DEXAMETHASONE 6 MILLIGRAM(S): 1 TABLET ORAL at 13:47

## 2024-05-28 RX ADMIN — Medication 800 MILLIGRAM(S): at 21:45

## 2024-05-28 RX ADMIN — DIAZEPAM 2 MILLIGRAM(S): 10 TABLET ORAL at 11:21

## 2024-05-28 RX ADMIN — Medication 800 MILLIGRAM(S): at 15:25

## 2024-05-28 RX ADMIN — DEXAMETHASONE 6 MILLIGRAM(S): 1 TABLET ORAL at 18:15

## 2024-05-28 RX ADMIN — Medication 5 MILLIGRAM(S): at 00:12

## 2024-05-28 RX ADMIN — ONDANSETRON HYDROCHLORIDE 4 MILLIGRAM(S): 2 INJECTION INTRAMUSCULAR; INTRAVENOUS at 23:55

## 2024-05-28 RX ADMIN — Medication 1000 MILLIGRAM(S): at 13:48

## 2024-05-28 RX ADMIN — NALOXEGOL OXALATE 25 MILLIGRAM(S): 25 TABLET, FILM COATED ORAL at 14:15

## 2024-05-28 RX ADMIN — DEXAMETHASONE 6 MILLIGRAM(S): 1 TABLET ORAL at 00:08

## 2024-05-28 RX ADMIN — Medication 10 MILLIGRAM(S): at 21:45

## 2024-05-28 RX ADMIN — Medication 10 MILLIGRAM(S): at 13:48

## 2024-05-28 RX ADMIN — INSULIN LISPRO 2: 100 INJECTION, SOLUTION SUBCUTANEOUS at 22:35

## 2024-05-28 RX ADMIN — INSULIN GLARGINE 4 UNIT(S): 100 INJECTION, SOLUTION SUBCUTANEOUS at 22:35

## 2024-05-28 RX ADMIN — ONDANSETRON HYDROCHLORIDE 4 MILLIGRAM(S): 2 INJECTION INTRAMUSCULAR; INTRAVENOUS at 18:15

## 2024-05-28 RX ADMIN — MESALAMINE 250 MILLIGRAM(S): 500 CAPSULE ORAL at 13:49

## 2024-05-28 RX ADMIN — RANOLAZINE 500 MILLIGRAM(S): 500 TABLET, EXTENDED RELEASE ORAL at 06:14

## 2024-05-28 RX ADMIN — ATORVASTATIN CALCIUM 40 MILLIGRAM(S): 20 TABLET, FILM COATED ORAL at 21:45

## 2024-05-28 RX ADMIN — MESALAMINE 250 MILLIGRAM(S): 500 CAPSULE ORAL at 00:08

## 2024-05-28 RX ADMIN — ISOSORBIDE DINITRATE 30 MILLIGRAM(S): 5 TABLET ORAL at 13:48

## 2024-05-28 RX ADMIN — ENOXAPARIN SODIUM 40 MILLIGRAM(S): 100 INJECTION SUBCUTANEOUS at 06:14

## 2024-05-28 RX ADMIN — PANTOPRAZOLE SODIUM 40 MILLIGRAM(S): 40 INJECTION, POWDER, FOR SOLUTION INTRAVENOUS at 06:14

## 2024-05-28 RX ADMIN — LORATADINE 10 MILLIGRAM(S): 10 TABLET ORAL at 13:49

## 2024-05-28 RX ADMIN — Medication 2 TABLET(S): at 21:45

## 2024-05-29 LAB
ANION GAP SERPL CALC-SCNC: 8 MMOL/L — SIGNIFICANT CHANGE UP (ref 5–17)
BUN SERPL-MCNC: 16 MG/DL — SIGNIFICANT CHANGE UP (ref 7–23)
CALCIUM SERPL-MCNC: 8.5 MG/DL — SIGNIFICANT CHANGE UP (ref 8.4–10.5)
CHLORIDE SERPL-SCNC: 100 MMOL/L — SIGNIFICANT CHANGE UP (ref 96–108)
CO2 SERPL-SCNC: 28 MMOL/L — SIGNIFICANT CHANGE UP (ref 22–31)
CREAT SERPL-MCNC: 0.55 MG/DL — SIGNIFICANT CHANGE UP (ref 0.5–1.3)
EGFR: 116 ML/MIN/1.73M2 — SIGNIFICANT CHANGE UP
GLUCOSE BLDC GLUCOMTR-MCNC: 170 MG/DL — HIGH (ref 70–99)
GLUCOSE BLDC GLUCOMTR-MCNC: 179 MG/DL — HIGH (ref 70–99)
GLUCOSE BLDC GLUCOMTR-MCNC: 180 MG/DL — HIGH (ref 70–99)
GLUCOSE BLDC GLUCOMTR-MCNC: 202 MG/DL — HIGH (ref 70–99)
GLUCOSE SERPL-MCNC: 165 MG/DL — HIGH (ref 70–99)
HCT VFR BLD CALC: 37 % — LOW (ref 39–50)
HGB BLD-MCNC: 12.5 G/DL — LOW (ref 13–17)
MCHC RBC-ENTMCNC: 28.6 PG — SIGNIFICANT CHANGE UP (ref 27–34)
MCHC RBC-ENTMCNC: 33.8 GM/DL — SIGNIFICANT CHANGE UP (ref 32–36)
MCV RBC AUTO: 84.7 FL — SIGNIFICANT CHANGE UP (ref 80–100)
NRBC # BLD: 0 /100 WBCS — SIGNIFICANT CHANGE UP (ref 0–0)
PLATELET # BLD AUTO: 148 K/UL — LOW (ref 150–400)
POTASSIUM SERPL-MCNC: 3.9 MMOL/L — SIGNIFICANT CHANGE UP (ref 3.5–5.3)
POTASSIUM SERPL-SCNC: 3.9 MMOL/L — SIGNIFICANT CHANGE UP (ref 3.5–5.3)
RBC # BLD: 4.37 M/UL — SIGNIFICANT CHANGE UP (ref 4.2–5.8)
RBC # FLD: 13.3 % — SIGNIFICANT CHANGE UP (ref 10.3–14.5)
SODIUM SERPL-SCNC: 136 MMOL/L — SIGNIFICANT CHANGE UP (ref 135–145)
WBC # BLD: 8.56 K/UL — SIGNIFICANT CHANGE UP (ref 3.8–10.5)
WBC # FLD AUTO: 8.56 K/UL — SIGNIFICANT CHANGE UP (ref 3.8–10.5)

## 2024-05-29 PROCEDURE — 99233 SBSQ HOSP IP/OBS HIGH 50: CPT

## 2024-05-29 RX ORDER — HYDROMORPHONE HCL 0.2 MG/ML
30 INJECTION, SOLUTION INTRAVENOUS
Refills: 0 | Status: DISCONTINUED | OUTPATIENT
Start: 2024-05-29 | End: 2024-05-30

## 2024-05-29 RX ORDER — POLYETHYLENE GLYCOL 3350 1 G/G
17 POWDER ORAL
Refills: 0 | Status: DISCONTINUED | OUTPATIENT
Start: 2024-05-29 | End: 2024-06-20

## 2024-05-29 RX ORDER — NALOXEGOL OXALATE 25 MG/1
25 TABLET, FILM COATED ORAL ONCE
Refills: 0 | Status: COMPLETED | OUTPATIENT
Start: 2024-05-29 | End: 2024-05-29

## 2024-05-29 RX ORDER — BISACODYL 5 MG
5 TABLET, DELAYED RELEASE (ENTERIC COATED) ORAL AT BEDTIME
Refills: 0 | Status: DISCONTINUED | OUTPATIENT
Start: 2024-05-29 | End: 2024-06-20

## 2024-05-29 RX ORDER — INSULIN GLARGINE 100 [IU]/ML
7 INJECTION, SOLUTION SUBCUTANEOUS AT BEDTIME
Refills: 0 | Status: DISCONTINUED | OUTPATIENT
Start: 2024-05-29 | End: 2024-06-01

## 2024-05-29 RX ORDER — NALOXONE HYDROCHLORIDE 1 MG/ML
4 INJECTION PARENTERAL
Refills: 0 | Status: DISCONTINUED | OUTPATIENT
Start: 2024-05-29 | End: 2024-06-20

## 2024-05-29 RX ADMIN — ISOSORBIDE DINITRATE 30 MILLIGRAM(S): 5 TABLET ORAL at 09:09

## 2024-05-29 RX ADMIN — INSULIN LISPRO 2: 100 INJECTION, SOLUTION SUBCUTANEOUS at 13:09

## 2024-05-29 RX ADMIN — ATORVASTATIN CALCIUM 40 MILLIGRAM(S): 20 TABLET, FILM COATED ORAL at 21:38

## 2024-05-29 RX ADMIN — DEXAMETHASONE 6 MILLIGRAM(S): 1 TABLET ORAL at 11:59

## 2024-05-29 RX ADMIN — Medication 1000 MILLIGRAM(S): at 13:48

## 2024-05-29 RX ADMIN — Medication 2 TABLET(S): at 21:38

## 2024-05-29 RX ADMIN — INSULIN GLARGINE 7 UNIT(S): 100 INJECTION, SOLUTION SUBCUTANEOUS at 21:44

## 2024-05-29 RX ADMIN — DEXAMETHASONE 6 MILLIGRAM(S): 1 TABLET ORAL at 18:11

## 2024-05-29 RX ADMIN — HYDROMORPHONE HCL 30 MILLILITER(S): 0.2 INJECTION, SOLUTION INTRAVENOUS at 18:03

## 2024-05-29 RX ADMIN — RANOLAZINE 500 MILLIGRAM(S): 500 TABLET, EXTENDED RELEASE ORAL at 18:12

## 2024-05-29 RX ADMIN — INSULIN LISPRO 2: 100 INJECTION, SOLUTION SUBCUTANEOUS at 07:26

## 2024-05-29 RX ADMIN — NALOXEGOL OXALATE 25 MILLIGRAM(S): 25 TABLET, FILM COATED ORAL at 11:59

## 2024-05-29 RX ADMIN — ONDANSETRON HYDROCHLORIDE 4 MILLIGRAM(S): 2 INJECTION INTRAMUSCULAR; INTRAVENOUS at 18:12

## 2024-05-29 RX ADMIN — MESALAMINE 250 MILLIGRAM(S): 500 CAPSULE ORAL at 11:59

## 2024-05-29 RX ADMIN — POLYETHYLENE GLYCOL 3350 17 GRAM(S): 1 POWDER ORAL at 11:58

## 2024-05-29 RX ADMIN — DEXAMETHASONE 6 MILLIGRAM(S): 1 TABLET ORAL at 23:59

## 2024-05-29 RX ADMIN — RANOLAZINE 500 MILLIGRAM(S): 500 TABLET, EXTENDED RELEASE ORAL at 05:11

## 2024-05-29 RX ADMIN — DIAZEPAM 5 MILLIGRAM(S): 10 TABLET ORAL at 06:41

## 2024-05-29 RX ADMIN — Medication 1000 MILLIGRAM(S): at 05:09

## 2024-05-29 RX ADMIN — MESALAMINE 250 MILLIGRAM(S): 500 CAPSULE ORAL at 05:11

## 2024-05-29 RX ADMIN — Medication 50 MILLIGRAM(S): at 18:11

## 2024-05-29 RX ADMIN — ENOXAPARIN SODIUM 40 MILLIGRAM(S): 100 INJECTION SUBCUTANEOUS at 05:10

## 2024-05-29 RX ADMIN — Medication 1000 MILLIGRAM(S): at 21:38

## 2024-05-29 RX ADMIN — HYDROMORPHONE HCL 30 MILLILITER(S): 0.2 INJECTION, SOLUTION INTRAVENOUS at 21:34

## 2024-05-29 RX ADMIN — PANTOPRAZOLE SODIUM 40 MILLIGRAM(S): 40 INJECTION, POWDER, FOR SOLUTION INTRAVENOUS at 05:10

## 2024-05-29 RX ADMIN — MESALAMINE 250 MILLIGRAM(S): 500 CAPSULE ORAL at 23:59

## 2024-05-29 RX ADMIN — Medication 800 MILLIGRAM(S): at 05:09

## 2024-05-29 RX ADMIN — LORATADINE 10 MILLIGRAM(S): 10 TABLET ORAL at 11:59

## 2024-05-29 RX ADMIN — ONDANSETRON HYDROCHLORIDE 4 MILLIGRAM(S): 2 INJECTION INTRAMUSCULAR; INTRAVENOUS at 23:59

## 2024-05-29 RX ADMIN — Medication 50 MILLIGRAM(S): at 05:10

## 2024-05-29 RX ADMIN — DEXAMETHASONE 6 MILLIGRAM(S): 1 TABLET ORAL at 05:11

## 2024-05-29 RX ADMIN — Medication 800 MILLIGRAM(S): at 13:48

## 2024-05-29 RX ADMIN — MESALAMINE 250 MILLIGRAM(S): 500 CAPSULE ORAL at 18:12

## 2024-05-29 RX ADMIN — ONDANSETRON HYDROCHLORIDE 4 MILLIGRAM(S): 2 INJECTION INTRAMUSCULAR; INTRAVENOUS at 05:11

## 2024-05-29 RX ADMIN — ASPIRIN 81 MILLIGRAM(S): 325 TABLET, FILM COATED ORAL at 11:59

## 2024-05-29 RX ADMIN — ONDANSETRON HYDROCHLORIDE 4 MILLIGRAM(S): 2 INJECTION INTRAMUSCULAR; INTRAVENOUS at 11:59

## 2024-05-29 RX ADMIN — ENOXAPARIN SODIUM 40 MILLIGRAM(S): 100 INJECTION SUBCUTANEOUS at 18:12

## 2024-05-29 RX ADMIN — INSULIN LISPRO 4: 100 INJECTION, SOLUTION SUBCUTANEOUS at 18:16

## 2024-05-29 RX ADMIN — Medication 800 MILLIGRAM(S): at 21:37

## 2024-05-29 RX ADMIN — Medication 10 MILLIGRAM(S): at 13:48

## 2024-05-29 RX ADMIN — Medication 10 MILLIGRAM(S): at 21:37

## 2024-05-29 RX ADMIN — INSULIN LISPRO 2: 100 INJECTION, SOLUTION SUBCUTANEOUS at 21:44

## 2024-05-29 RX ADMIN — Medication 10 MILLIGRAM(S): at 05:10

## 2024-05-29 RX ADMIN — Medication 5 MILLIGRAM(S): at 21:45

## 2024-05-29 RX ADMIN — DIAZEPAM 5 MILLIGRAM(S): 10 TABLET ORAL at 22:18

## 2024-05-30 LAB
ANION GAP SERPL CALC-SCNC: 11 MMOL/L — SIGNIFICANT CHANGE UP (ref 5–17)
BUN SERPL-MCNC: 16 MG/DL — SIGNIFICANT CHANGE UP (ref 7–23)
CALCIUM SERPL-MCNC: 8.6 MG/DL — SIGNIFICANT CHANGE UP (ref 8.4–10.5)
CHLORIDE SERPL-SCNC: 98 MMOL/L — SIGNIFICANT CHANGE UP (ref 96–108)
CO2 SERPL-SCNC: 28 MMOL/L — SIGNIFICANT CHANGE UP (ref 22–31)
CREAT SERPL-MCNC: 0.57 MG/DL — SIGNIFICANT CHANGE UP (ref 0.5–1.3)
EGFR: 114 ML/MIN/1.73M2 — SIGNIFICANT CHANGE UP
GLUCOSE BLDC GLUCOMTR-MCNC: 154 MG/DL — HIGH (ref 70–99)
GLUCOSE BLDC GLUCOMTR-MCNC: 164 MG/DL — HIGH (ref 70–99)
GLUCOSE BLDC GLUCOMTR-MCNC: 226 MG/DL — HIGH (ref 70–99)
GLUCOSE BLDC GLUCOMTR-MCNC: 236 MG/DL — HIGH (ref 70–99)
GLUCOSE SERPL-MCNC: 163 MG/DL — HIGH (ref 70–99)
HCT VFR BLD CALC: 37.6 % — LOW (ref 39–50)
HGB BLD-MCNC: 12.8 G/DL — LOW (ref 13–17)
MCHC RBC-ENTMCNC: 28.8 PG — SIGNIFICANT CHANGE UP (ref 27–34)
MCHC RBC-ENTMCNC: 34 GM/DL — SIGNIFICANT CHANGE UP (ref 32–36)
MCV RBC AUTO: 84.5 FL — SIGNIFICANT CHANGE UP (ref 80–100)
NRBC # BLD: 0 /100 WBCS — SIGNIFICANT CHANGE UP (ref 0–0)
PLATELET # BLD AUTO: 160 K/UL — SIGNIFICANT CHANGE UP (ref 150–400)
POTASSIUM SERPL-MCNC: 4 MMOL/L — SIGNIFICANT CHANGE UP (ref 3.5–5.3)
POTASSIUM SERPL-SCNC: 4 MMOL/L — SIGNIFICANT CHANGE UP (ref 3.5–5.3)
RBC # BLD: 4.45 M/UL — SIGNIFICANT CHANGE UP (ref 4.2–5.8)
RBC # FLD: 13.2 % — SIGNIFICANT CHANGE UP (ref 10.3–14.5)
SODIUM SERPL-SCNC: 137 MMOL/L — SIGNIFICANT CHANGE UP (ref 135–145)
WBC # BLD: 10.56 K/UL — HIGH (ref 3.8–10.5)
WBC # FLD AUTO: 10.56 K/UL — HIGH (ref 3.8–10.5)

## 2024-05-30 PROCEDURE — 99233 SBSQ HOSP IP/OBS HIGH 50: CPT

## 2024-05-30 RX ORDER — HYDROMORPHONE HCL 0.2 MG/ML
30 INJECTION, SOLUTION INTRAVENOUS
Refills: 0 | Status: DISCONTINUED | OUTPATIENT
Start: 2024-05-30 | End: 2024-05-30

## 2024-05-30 RX ORDER — LEVETIRACETAM 100 MG/ML
250 INJECTION INTRAVENOUS EVERY 12 HOURS
Refills: 0 | Status: DISCONTINUED | OUTPATIENT
Start: 2024-05-30 | End: 2024-06-03

## 2024-05-30 RX ORDER — HYDROMORPHONE HCL 0.2 MG/ML
30 INJECTION, SOLUTION INTRAVENOUS
Refills: 0 | Status: DISCONTINUED | OUTPATIENT
Start: 2024-05-30 | End: 2024-05-31

## 2024-05-30 RX ADMIN — Medication 5 MILLIGRAM(S): at 22:35

## 2024-05-30 RX ADMIN — LORATADINE 10 MILLIGRAM(S): 10 TABLET ORAL at 13:07

## 2024-05-30 RX ADMIN — DIAZEPAM 5 MILLIGRAM(S): 10 TABLET ORAL at 07:21

## 2024-05-30 RX ADMIN — INSULIN LISPRO 4: 100 INJECTION, SOLUTION SUBCUTANEOUS at 12:40

## 2024-05-30 RX ADMIN — Medication 10 MILLIGRAM(S): at 22:35

## 2024-05-30 RX ADMIN — MESALAMINE 250 MILLIGRAM(S): 500 CAPSULE ORAL at 05:34

## 2024-05-30 RX ADMIN — Medication 1000 MILLIGRAM(S): at 22:35

## 2024-05-30 RX ADMIN — DIAZEPAM 5 MILLIGRAM(S): 10 TABLET ORAL at 13:08

## 2024-05-30 RX ADMIN — INSULIN LISPRO 2: 100 INJECTION, SOLUTION SUBCUTANEOUS at 07:12

## 2024-05-30 RX ADMIN — MESALAMINE 250 MILLIGRAM(S): 500 CAPSULE ORAL at 13:07

## 2024-05-30 RX ADMIN — Medication 1000 MILLIGRAM(S): at 13:08

## 2024-05-30 RX ADMIN — Medication 50 MILLIGRAM(S): at 05:33

## 2024-05-30 RX ADMIN — DIAZEPAM 5 MILLIGRAM(S): 10 TABLET ORAL at 22:35

## 2024-05-30 RX ADMIN — Medication 800 MILLIGRAM(S): at 22:34

## 2024-05-30 RX ADMIN — RANOLAZINE 500 MILLIGRAM(S): 500 TABLET, EXTENDED RELEASE ORAL at 18:13

## 2024-05-30 RX ADMIN — INSULIN LISPRO 2: 100 INJECTION, SOLUTION SUBCUTANEOUS at 18:30

## 2024-05-30 RX ADMIN — ONDANSETRON HYDROCHLORIDE 4 MILLIGRAM(S): 2 INJECTION INTRAMUSCULAR; INTRAVENOUS at 18:13

## 2024-05-30 RX ADMIN — Medication 10 MILLIGRAM(S): at 05:33

## 2024-05-30 RX ADMIN — ATORVASTATIN CALCIUM 40 MILLIGRAM(S): 20 TABLET, FILM COATED ORAL at 22:35

## 2024-05-30 RX ADMIN — Medication 800 MILLIGRAM(S): at 05:33

## 2024-05-30 RX ADMIN — POLYETHYLENE GLYCOL 3350 17 GRAM(S): 1 POWDER ORAL at 18:12

## 2024-05-30 RX ADMIN — ENOXAPARIN SODIUM 40 MILLIGRAM(S): 100 INJECTION SUBCUTANEOUS at 05:34

## 2024-05-30 RX ADMIN — RANOLAZINE 500 MILLIGRAM(S): 500 TABLET, EXTENDED RELEASE ORAL at 05:34

## 2024-05-30 RX ADMIN — HYDROMORPHONE HCL 30 MILLILITER(S): 0.2 INJECTION, SOLUTION INTRAVENOUS at 18:41

## 2024-05-30 RX ADMIN — PANTOPRAZOLE SODIUM 40 MILLIGRAM(S): 40 INJECTION, POWDER, FOR SOLUTION INTRAVENOUS at 05:34

## 2024-05-30 RX ADMIN — Medication 1000 MILLIGRAM(S): at 23:30

## 2024-05-30 RX ADMIN — ISOSORBIDE DINITRATE 30 MILLIGRAM(S): 5 TABLET ORAL at 11:06

## 2024-05-30 RX ADMIN — DEXAMETHASONE 6 MILLIGRAM(S): 1 TABLET ORAL at 05:34

## 2024-05-30 RX ADMIN — ENOXAPARIN SODIUM 40 MILLIGRAM(S): 100 INJECTION SUBCUTANEOUS at 18:12

## 2024-05-30 RX ADMIN — DEXAMETHASONE 6 MILLIGRAM(S): 1 TABLET ORAL at 13:09

## 2024-05-30 RX ADMIN — DEXAMETHASONE 6 MILLIGRAM(S): 1 TABLET ORAL at 18:12

## 2024-05-30 RX ADMIN — INSULIN LISPRO 4: 100 INJECTION, SOLUTION SUBCUTANEOUS at 22:31

## 2024-05-30 RX ADMIN — Medication 10 MILLIGRAM(S): at 13:08

## 2024-05-30 RX ADMIN — MESALAMINE 250 MILLIGRAM(S): 500 CAPSULE ORAL at 18:12

## 2024-05-30 RX ADMIN — Medication 1000 MILLIGRAM(S): at 05:33

## 2024-05-30 RX ADMIN — ASPIRIN 81 MILLIGRAM(S): 325 TABLET, FILM COATED ORAL at 13:08

## 2024-05-30 RX ADMIN — Medication 50 MILLIGRAM(S): at 18:13

## 2024-05-30 RX ADMIN — ONDANSETRON HYDROCHLORIDE 4 MILLIGRAM(S): 2 INJECTION INTRAMUSCULAR; INTRAVENOUS at 13:09

## 2024-05-30 RX ADMIN — INSULIN GLARGINE 7 UNIT(S): 100 INJECTION, SOLUTION SUBCUTANEOUS at 22:32

## 2024-05-30 RX ADMIN — Medication 800 MILLIGRAM(S): at 13:09

## 2024-05-30 RX ADMIN — Medication 2 TABLET(S): at 22:35

## 2024-05-31 LAB
ANION GAP SERPL CALC-SCNC: 12 MMOL/L — SIGNIFICANT CHANGE UP (ref 5–17)
BUN SERPL-MCNC: 18 MG/DL — SIGNIFICANT CHANGE UP (ref 7–23)
CALCIUM SERPL-MCNC: 8.8 MG/DL — SIGNIFICANT CHANGE UP (ref 8.4–10.5)
CHLORIDE SERPL-SCNC: 99 MMOL/L — SIGNIFICANT CHANGE UP (ref 96–108)
CO2 SERPL-SCNC: 26 MMOL/L — SIGNIFICANT CHANGE UP (ref 22–31)
CREAT SERPL-MCNC: 0.58 MG/DL — SIGNIFICANT CHANGE UP (ref 0.5–1.3)
EGFR: 114 ML/MIN/1.73M2 — SIGNIFICANT CHANGE UP
GLUCOSE BLDC GLUCOMTR-MCNC: 148 MG/DL — HIGH (ref 70–99)
GLUCOSE BLDC GLUCOMTR-MCNC: 215 MG/DL — HIGH (ref 70–99)
GLUCOSE BLDC GLUCOMTR-MCNC: 221 MG/DL — HIGH (ref 70–99)
GLUCOSE BLDC GLUCOMTR-MCNC: 258 MG/DL — HIGH (ref 70–99)
GLUCOSE SERPL-MCNC: 191 MG/DL — HIGH (ref 70–99)
HCT VFR BLD CALC: 39.1 % — SIGNIFICANT CHANGE UP (ref 39–50)
HGB BLD-MCNC: 13.4 G/DL — SIGNIFICANT CHANGE UP (ref 13–17)
MCHC RBC-ENTMCNC: 28.8 PG — SIGNIFICANT CHANGE UP (ref 27–34)
MCHC RBC-ENTMCNC: 34.3 GM/DL — SIGNIFICANT CHANGE UP (ref 32–36)
MCV RBC AUTO: 84.1 FL — SIGNIFICANT CHANGE UP (ref 80–100)
NRBC # BLD: 0 /100 WBCS — SIGNIFICANT CHANGE UP (ref 0–0)
PLATELET # BLD AUTO: 179 K/UL — SIGNIFICANT CHANGE UP (ref 150–400)
POTASSIUM SERPL-MCNC: 4 MMOL/L — SIGNIFICANT CHANGE UP (ref 3.5–5.3)
POTASSIUM SERPL-SCNC: 4 MMOL/L — SIGNIFICANT CHANGE UP (ref 3.5–5.3)
RBC # BLD: 4.65 M/UL — SIGNIFICANT CHANGE UP (ref 4.2–5.8)
RBC # FLD: 13.3 % — SIGNIFICANT CHANGE UP (ref 10.3–14.5)
SODIUM SERPL-SCNC: 137 MMOL/L — SIGNIFICANT CHANGE UP (ref 135–145)
WBC # BLD: 14.19 K/UL — HIGH (ref 3.8–10.5)
WBC # FLD AUTO: 14.19 K/UL — HIGH (ref 3.8–10.5)

## 2024-05-31 PROCEDURE — 99231 SBSQ HOSP IP/OBS SF/LOW 25: CPT

## 2024-05-31 PROCEDURE — 99233 SBSQ HOSP IP/OBS HIGH 50: CPT

## 2024-05-31 RX ORDER — HYDROMORPHONE HCL 0.2 MG/ML
30 INJECTION, SOLUTION INTRAVENOUS
Refills: 0 | Status: DISCONTINUED | OUTPATIENT
Start: 2024-05-31 | End: 2024-06-02

## 2024-05-31 RX ADMIN — ONDANSETRON HYDROCHLORIDE 4 MILLIGRAM(S): 2 INJECTION INTRAMUSCULAR; INTRAVENOUS at 06:15

## 2024-05-31 RX ADMIN — LEVETIRACETAM 250 MILLIGRAM(S): 100 INJECTION INTRAVENOUS at 18:06

## 2024-05-31 RX ADMIN — Medication 50 MILLIGRAM(S): at 06:15

## 2024-05-31 RX ADMIN — ONDANSETRON HYDROCHLORIDE 4 MILLIGRAM(S): 2 INJECTION INTRAMUSCULAR; INTRAVENOUS at 12:18

## 2024-05-31 RX ADMIN — MESALAMINE 250 MILLIGRAM(S): 500 CAPSULE ORAL at 06:28

## 2024-05-31 RX ADMIN — DIAZEPAM 5 MILLIGRAM(S): 10 TABLET ORAL at 06:10

## 2024-05-31 RX ADMIN — INSULIN GLARGINE 7 UNIT(S): 100 INJECTION, SOLUTION SUBCUTANEOUS at 22:19

## 2024-05-31 RX ADMIN — MESALAMINE 250 MILLIGRAM(S): 500 CAPSULE ORAL at 00:28

## 2024-05-31 RX ADMIN — LEVETIRACETAM 250 MILLIGRAM(S): 100 INJECTION INTRAVENOUS at 06:46

## 2024-05-31 RX ADMIN — POLYETHYLENE GLYCOL 3350 17 GRAM(S): 1 POWDER ORAL at 18:06

## 2024-05-31 RX ADMIN — Medication 1000 MILLIGRAM(S): at 07:15

## 2024-05-31 RX ADMIN — PANTOPRAZOLE SODIUM 40 MILLIGRAM(S): 40 INJECTION, POWDER, FOR SOLUTION INTRAVENOUS at 06:11

## 2024-05-31 RX ADMIN — INSULIN LISPRO 6: 100 INJECTION, SOLUTION SUBCUTANEOUS at 22:18

## 2024-05-31 RX ADMIN — Medication 800 MILLIGRAM(S): at 06:15

## 2024-05-31 RX ADMIN — Medication 10 MILLIGRAM(S): at 13:35

## 2024-05-31 RX ADMIN — Medication 1000 MILLIGRAM(S): at 06:15

## 2024-05-31 RX ADMIN — Medication 1000 MILLIGRAM(S): at 13:35

## 2024-05-31 RX ADMIN — POLYETHYLENE GLYCOL 3350 17 GRAM(S): 1 POWDER ORAL at 06:28

## 2024-05-31 RX ADMIN — ONDANSETRON HYDROCHLORIDE 4 MILLIGRAM(S): 2 INJECTION INTRAMUSCULAR; INTRAVENOUS at 18:03

## 2024-05-31 RX ADMIN — DEXAMETHASONE 6 MILLIGRAM(S): 1 TABLET ORAL at 00:28

## 2024-05-31 RX ADMIN — DEXAMETHASONE 6 MILLIGRAM(S): 1 TABLET ORAL at 18:07

## 2024-05-31 RX ADMIN — Medication 50 MILLIGRAM(S): at 18:05

## 2024-05-31 RX ADMIN — RANOLAZINE 500 MILLIGRAM(S): 500 TABLET, EXTENDED RELEASE ORAL at 18:04

## 2024-05-31 RX ADMIN — DIAZEPAM 5 MILLIGRAM(S): 10 TABLET ORAL at 13:36

## 2024-05-31 RX ADMIN — RANOLAZINE 500 MILLIGRAM(S): 500 TABLET, EXTENDED RELEASE ORAL at 06:45

## 2024-05-31 RX ADMIN — Medication 800 MILLIGRAM(S): at 21:40

## 2024-05-31 RX ADMIN — ISOSORBIDE DINITRATE 30 MILLIGRAM(S): 5 TABLET ORAL at 10:33

## 2024-05-31 RX ADMIN — ENOXAPARIN SODIUM 40 MILLIGRAM(S): 100 INJECTION SUBCUTANEOUS at 18:06

## 2024-05-31 RX ADMIN — Medication 800 MILLIGRAM(S): at 13:35

## 2024-05-31 RX ADMIN — ONDANSETRON HYDROCHLORIDE 4 MILLIGRAM(S): 2 INJECTION INTRAMUSCULAR; INTRAVENOUS at 00:28

## 2024-05-31 RX ADMIN — Medication 5 MILLIGRAM(S): at 21:40

## 2024-05-31 RX ADMIN — LORATADINE 10 MILLIGRAM(S): 10 TABLET ORAL at 13:36

## 2024-05-31 RX ADMIN — MESALAMINE 250 MILLIGRAM(S): 500 CAPSULE ORAL at 12:19

## 2024-05-31 RX ADMIN — ENOXAPARIN SODIUM 40 MILLIGRAM(S): 100 INJECTION SUBCUTANEOUS at 06:17

## 2024-05-31 RX ADMIN — INSULIN LISPRO 4: 100 INJECTION, SOLUTION SUBCUTANEOUS at 08:07

## 2024-05-31 RX ADMIN — Medication 10 MILLIGRAM(S): at 21:40

## 2024-05-31 RX ADMIN — DIAZEPAM 5 MILLIGRAM(S): 10 TABLET ORAL at 21:41

## 2024-05-31 RX ADMIN — Medication 10 MILLIGRAM(S): at 06:15

## 2024-05-31 RX ADMIN — HYDROMORPHONE HCL 30 MILLILITER(S): 0.2 INJECTION, SOLUTION INTRAVENOUS at 13:06

## 2024-05-31 RX ADMIN — Medication 1000 MILLIGRAM(S): at 21:41

## 2024-05-31 RX ADMIN — MESALAMINE 250 MILLIGRAM(S): 500 CAPSULE ORAL at 18:06

## 2024-05-31 RX ADMIN — DEXAMETHASONE 6 MILLIGRAM(S): 1 TABLET ORAL at 06:15

## 2024-05-31 RX ADMIN — HYDROMORPHONE HCL 30 MILLILITER(S): 0.2 INJECTION, SOLUTION INTRAVENOUS at 19:21

## 2024-05-31 RX ADMIN — Medication 2 TABLET(S): at 21:41

## 2024-05-31 RX ADMIN — INSULIN LISPRO 4: 100 INJECTION, SOLUTION SUBCUTANEOUS at 12:18

## 2024-05-31 RX ADMIN — ATORVASTATIN CALCIUM 40 MILLIGRAM(S): 20 TABLET, FILM COATED ORAL at 21:40

## 2024-05-31 RX ADMIN — ASPIRIN 81 MILLIGRAM(S): 325 TABLET, FILM COATED ORAL at 16:45

## 2024-05-31 RX ADMIN — DEXAMETHASONE 6 MILLIGRAM(S): 1 TABLET ORAL at 12:19

## 2024-06-01 LAB
ANION GAP SERPL CALC-SCNC: 11 MMOL/L — SIGNIFICANT CHANGE UP (ref 5–17)
BUN SERPL-MCNC: 18 MG/DL — SIGNIFICANT CHANGE UP (ref 7–23)
CALCIUM SERPL-MCNC: 8.5 MG/DL — SIGNIFICANT CHANGE UP (ref 8.4–10.5)
CHLORIDE SERPL-SCNC: 98 MMOL/L — SIGNIFICANT CHANGE UP (ref 96–108)
CO2 SERPL-SCNC: 27 MMOL/L — SIGNIFICANT CHANGE UP (ref 22–31)
CREAT SERPL-MCNC: 0.56 MG/DL — SIGNIFICANT CHANGE UP (ref 0.5–1.3)
EGFR: 115 ML/MIN/1.73M2 — SIGNIFICANT CHANGE UP
GLUCOSE BLDC GLUCOMTR-MCNC: 253 MG/DL — HIGH (ref 70–99)
GLUCOSE BLDC GLUCOMTR-MCNC: 294 MG/DL — HIGH (ref 70–99)
GLUCOSE BLDC GLUCOMTR-MCNC: 304 MG/DL — HIGH (ref 70–99)
GLUCOSE BLDC GLUCOMTR-MCNC: 341 MG/DL — HIGH (ref 70–99)
GLUCOSE SERPL-MCNC: 299 MG/DL — HIGH (ref 70–99)
HCT VFR BLD CALC: 37.5 % — LOW (ref 39–50)
HGB BLD-MCNC: 13 G/DL — SIGNIFICANT CHANGE UP (ref 13–17)
MCHC RBC-ENTMCNC: 29 PG — SIGNIFICANT CHANGE UP (ref 27–34)
MCHC RBC-ENTMCNC: 34.7 GM/DL — SIGNIFICANT CHANGE UP (ref 32–36)
MCV RBC AUTO: 83.7 FL — SIGNIFICANT CHANGE UP (ref 80–100)
NRBC # BLD: 0 /100 WBCS — SIGNIFICANT CHANGE UP (ref 0–0)
PLATELET # BLD AUTO: 166 K/UL — SIGNIFICANT CHANGE UP (ref 150–400)
POTASSIUM SERPL-MCNC: 3.9 MMOL/L — SIGNIFICANT CHANGE UP (ref 3.5–5.3)
POTASSIUM SERPL-SCNC: 3.9 MMOL/L — SIGNIFICANT CHANGE UP (ref 3.5–5.3)
RBC # BLD: 4.48 M/UL — SIGNIFICANT CHANGE UP (ref 4.2–5.8)
RBC # FLD: 13.3 % — SIGNIFICANT CHANGE UP (ref 10.3–14.5)
SODIUM SERPL-SCNC: 136 MMOL/L — SIGNIFICANT CHANGE UP (ref 135–145)
WBC # BLD: 12.21 K/UL — HIGH (ref 3.8–10.5)
WBC # FLD AUTO: 12.21 K/UL — HIGH (ref 3.8–10.5)

## 2024-06-01 PROCEDURE — 99233 SBSQ HOSP IP/OBS HIGH 50: CPT

## 2024-06-01 RX ORDER — INSULIN GLARGINE 100 [IU]/ML
10 INJECTION, SOLUTION SUBCUTANEOUS AT BEDTIME
Refills: 0 | Status: DISCONTINUED | OUTPATIENT
Start: 2024-06-01 | End: 2024-06-05

## 2024-06-01 RX ORDER — INSULIN LISPRO 100 [IU]/ML
3 INJECTION, SOLUTION SUBCUTANEOUS
Refills: 0 | Status: DISCONTINUED | OUTPATIENT
Start: 2024-06-01 | End: 2024-06-05

## 2024-06-01 RX ADMIN — POLYETHYLENE GLYCOL 3350 17 GRAM(S): 1 POWDER ORAL at 17:29

## 2024-06-01 RX ADMIN — Medication 1000 MILLIGRAM(S): at 13:51

## 2024-06-01 RX ADMIN — ONDANSETRON HYDROCHLORIDE 4 MILLIGRAM(S): 2 INJECTION INTRAMUSCULAR; INTRAVENOUS at 17:31

## 2024-06-01 RX ADMIN — HYDROMORPHONE HCL 30 MILLILITER(S): 0.2 INJECTION, SOLUTION INTRAVENOUS at 17:10

## 2024-06-01 RX ADMIN — LEVETIRACETAM 250 MILLIGRAM(S): 100 INJECTION INTRAVENOUS at 05:34

## 2024-06-01 RX ADMIN — Medication 5 MILLIGRAM(S): at 22:40

## 2024-06-01 RX ADMIN — HYDROMORPHONE HCL 2 MILLIGRAM(S): 0.2 INJECTION, SOLUTION INTRAVENOUS at 12:17

## 2024-06-01 RX ADMIN — DEXAMETHASONE 6 MILLIGRAM(S): 1 TABLET ORAL at 12:14

## 2024-06-01 RX ADMIN — Medication 50 MILLIGRAM(S): at 17:31

## 2024-06-01 RX ADMIN — POLYETHYLENE GLYCOL 3350 17 GRAM(S): 1 POWDER ORAL at 05:33

## 2024-06-01 RX ADMIN — Medication 2 TABLET(S): at 22:40

## 2024-06-01 RX ADMIN — INSULIN LISPRO 8: 100 INJECTION, SOLUTION SUBCUTANEOUS at 22:39

## 2024-06-01 RX ADMIN — ONDANSETRON HYDROCHLORIDE 4 MILLIGRAM(S): 2 INJECTION INTRAMUSCULAR; INTRAVENOUS at 05:34

## 2024-06-01 RX ADMIN — INSULIN LISPRO 6: 100 INJECTION, SOLUTION SUBCUTANEOUS at 17:33

## 2024-06-01 RX ADMIN — INSULIN LISPRO 8: 100 INJECTION, SOLUTION SUBCUTANEOUS at 07:06

## 2024-06-01 RX ADMIN — PANTOPRAZOLE SODIUM 40 MILLIGRAM(S): 40 INJECTION, POWDER, FOR SOLUTION INTRAVENOUS at 05:33

## 2024-06-01 RX ADMIN — DEXAMETHASONE 6 MILLIGRAM(S): 1 TABLET ORAL at 05:35

## 2024-06-01 RX ADMIN — LORATADINE 10 MILLIGRAM(S): 10 TABLET ORAL at 12:15

## 2024-06-01 RX ADMIN — HYDROMORPHONE HCL 2 MILLIGRAM(S): 0.2 INJECTION, SOLUTION INTRAVENOUS at 05:58

## 2024-06-01 RX ADMIN — DIAZEPAM 5 MILLIGRAM(S): 10 TABLET ORAL at 22:40

## 2024-06-01 RX ADMIN — Medication 800 MILLIGRAM(S): at 22:40

## 2024-06-01 RX ADMIN — Medication 10 MILLIGRAM(S): at 05:34

## 2024-06-01 RX ADMIN — ENOXAPARIN SODIUM 40 MILLIGRAM(S): 100 INJECTION SUBCUTANEOUS at 17:29

## 2024-06-01 RX ADMIN — Medication 1000 MILLIGRAM(S): at 22:39

## 2024-06-01 RX ADMIN — DEXAMETHASONE 6 MILLIGRAM(S): 1 TABLET ORAL at 17:30

## 2024-06-01 RX ADMIN — MESALAMINE 250 MILLIGRAM(S): 500 CAPSULE ORAL at 00:24

## 2024-06-01 RX ADMIN — HYDROMORPHONE HCL 2 MILLIGRAM(S): 0.2 INJECTION, SOLUTION INTRAVENOUS at 06:30

## 2024-06-01 RX ADMIN — MESALAMINE 250 MILLIGRAM(S): 500 CAPSULE ORAL at 12:15

## 2024-06-01 RX ADMIN — MESALAMINE 250 MILLIGRAM(S): 500 CAPSULE ORAL at 05:33

## 2024-06-01 RX ADMIN — INSULIN GLARGINE 10 UNIT(S): 100 INJECTION, SOLUTION SUBCUTANEOUS at 22:40

## 2024-06-01 RX ADMIN — Medication 800 MILLIGRAM(S): at 13:51

## 2024-06-01 RX ADMIN — ONDANSETRON HYDROCHLORIDE 4 MILLIGRAM(S): 2 INJECTION INTRAMUSCULAR; INTRAVENOUS at 12:15

## 2024-06-01 RX ADMIN — ASPIRIN 81 MILLIGRAM(S): 325 TABLET, FILM COATED ORAL at 12:15

## 2024-06-01 RX ADMIN — DEXAMETHASONE 6 MILLIGRAM(S): 1 TABLET ORAL at 00:24

## 2024-06-01 RX ADMIN — RANOLAZINE 500 MILLIGRAM(S): 500 TABLET, EXTENDED RELEASE ORAL at 17:29

## 2024-06-01 RX ADMIN — ENOXAPARIN SODIUM 40 MILLIGRAM(S): 100 INJECTION SUBCUTANEOUS at 05:33

## 2024-06-01 RX ADMIN — INSULIN LISPRO 3 UNIT(S): 100 INJECTION, SOLUTION SUBCUTANEOUS at 17:33

## 2024-06-01 RX ADMIN — DIAZEPAM 5 MILLIGRAM(S): 10 TABLET ORAL at 13:51

## 2024-06-01 RX ADMIN — ISOSORBIDE DINITRATE 30 MILLIGRAM(S): 5 TABLET ORAL at 12:17

## 2024-06-01 RX ADMIN — Medication 10 MILLIGRAM(S): at 13:51

## 2024-06-01 RX ADMIN — ONDANSETRON HYDROCHLORIDE 4 MILLIGRAM(S): 2 INJECTION INTRAMUSCULAR; INTRAVENOUS at 00:24

## 2024-06-01 RX ADMIN — RANOLAZINE 500 MILLIGRAM(S): 500 TABLET, EXTENDED RELEASE ORAL at 05:33

## 2024-06-01 RX ADMIN — Medication 10 MILLIGRAM(S): at 22:40

## 2024-06-01 RX ADMIN — DIAZEPAM 5 MILLIGRAM(S): 10 TABLET ORAL at 05:34

## 2024-06-01 RX ADMIN — Medication 800 MILLIGRAM(S): at 05:34

## 2024-06-01 RX ADMIN — LEVETIRACETAM 250 MILLIGRAM(S): 100 INJECTION INTRAVENOUS at 17:29

## 2024-06-01 RX ADMIN — HYDROMORPHONE HCL 2 MILLIGRAM(S): 0.2 INJECTION, SOLUTION INTRAVENOUS at 12:32

## 2024-06-01 RX ADMIN — Medication 1000 MILLIGRAM(S): at 05:34

## 2024-06-01 RX ADMIN — MESALAMINE 250 MILLIGRAM(S): 500 CAPSULE ORAL at 17:29

## 2024-06-01 RX ADMIN — Medication 50 MILLIGRAM(S): at 05:34

## 2024-06-01 RX ADMIN — INSULIN LISPRO 6: 100 INJECTION, SOLUTION SUBCUTANEOUS at 12:21

## 2024-06-01 RX ADMIN — ATORVASTATIN CALCIUM 40 MILLIGRAM(S): 20 TABLET, FILM COATED ORAL at 22:40

## 2024-06-02 LAB
ANION GAP SERPL CALC-SCNC: 9 MMOL/L — SIGNIFICANT CHANGE UP (ref 5–17)
BUN SERPL-MCNC: 15 MG/DL — SIGNIFICANT CHANGE UP (ref 7–23)
CALCIUM SERPL-MCNC: 8.5 MG/DL — SIGNIFICANT CHANGE UP (ref 8.4–10.5)
CHLORIDE SERPL-SCNC: 101 MMOL/L — SIGNIFICANT CHANGE UP (ref 96–108)
CO2 SERPL-SCNC: 28 MMOL/L — SIGNIFICANT CHANGE UP (ref 22–31)
CREAT SERPL-MCNC: 0.63 MG/DL — SIGNIFICANT CHANGE UP (ref 0.5–1.3)
EGFR: 111 ML/MIN/1.73M2 — SIGNIFICANT CHANGE UP
GLUCOSE BLDC GLUCOMTR-MCNC: 189 MG/DL — HIGH (ref 70–99)
GLUCOSE BLDC GLUCOMTR-MCNC: 251 MG/DL — HIGH (ref 70–99)
GLUCOSE BLDC GLUCOMTR-MCNC: 252 MG/DL — HIGH (ref 70–99)
GLUCOSE BLDC GLUCOMTR-MCNC: 286 MG/DL — HIGH (ref 70–99)
GLUCOSE SERPL-MCNC: 215 MG/DL — HIGH (ref 70–99)
HCT VFR BLD CALC: 39 % — SIGNIFICANT CHANGE UP (ref 39–50)
HGB BLD-MCNC: 13 G/DL — SIGNIFICANT CHANGE UP (ref 13–17)
MCHC RBC-ENTMCNC: 28.6 PG — SIGNIFICANT CHANGE UP (ref 27–34)
MCHC RBC-ENTMCNC: 33.3 GM/DL — SIGNIFICANT CHANGE UP (ref 32–36)
MCV RBC AUTO: 85.9 FL — SIGNIFICANT CHANGE UP (ref 80–100)
NRBC # BLD: 0 /100 WBCS — SIGNIFICANT CHANGE UP (ref 0–0)
PLATELET # BLD AUTO: 168 K/UL — SIGNIFICANT CHANGE UP (ref 150–400)
POTASSIUM SERPL-MCNC: 4 MMOL/L — SIGNIFICANT CHANGE UP (ref 3.5–5.3)
POTASSIUM SERPL-SCNC: 4 MMOL/L — SIGNIFICANT CHANGE UP (ref 3.5–5.3)
RBC # BLD: 4.54 M/UL — SIGNIFICANT CHANGE UP (ref 4.2–5.8)
RBC # FLD: 13.3 % — SIGNIFICANT CHANGE UP (ref 10.3–14.5)
SODIUM SERPL-SCNC: 138 MMOL/L — SIGNIFICANT CHANGE UP (ref 135–145)
WBC # BLD: 14.07 K/UL — HIGH (ref 3.8–10.5)
WBC # FLD AUTO: 14.07 K/UL — HIGH (ref 3.8–10.5)

## 2024-06-02 PROCEDURE — 99233 SBSQ HOSP IP/OBS HIGH 50: CPT

## 2024-06-02 RX ORDER — HYDROMORPHONE HCL 0.2 MG/ML
30 INJECTION, SOLUTION INTRAVENOUS
Refills: 0 | Status: DISCONTINUED | OUTPATIENT
Start: 2024-06-02 | End: 2024-06-03

## 2024-06-02 RX ADMIN — Medication 1000 MILLIGRAM(S): at 05:17

## 2024-06-02 RX ADMIN — Medication 800 MILLIGRAM(S): at 22:19

## 2024-06-02 RX ADMIN — RANOLAZINE 500 MILLIGRAM(S): 500 TABLET, EXTENDED RELEASE ORAL at 05:10

## 2024-06-02 RX ADMIN — POLYETHYLENE GLYCOL 3350 17 GRAM(S): 1 POWDER ORAL at 17:21

## 2024-06-02 RX ADMIN — ENOXAPARIN SODIUM 40 MILLIGRAM(S): 100 INJECTION SUBCUTANEOUS at 05:15

## 2024-06-02 RX ADMIN — LEVETIRACETAM 250 MILLIGRAM(S): 100 INJECTION INTRAVENOUS at 05:10

## 2024-06-02 RX ADMIN — Medication 800 MILLIGRAM(S): at 05:16

## 2024-06-02 RX ADMIN — LEVETIRACETAM 250 MILLIGRAM(S): 100 INJECTION INTRAVENOUS at 17:21

## 2024-06-02 RX ADMIN — INSULIN LISPRO 3 UNIT(S): 100 INJECTION, SOLUTION SUBCUTANEOUS at 17:20

## 2024-06-02 RX ADMIN — Medication 10 MILLIGRAM(S): at 22:19

## 2024-06-02 RX ADMIN — Medication 5 MILLIGRAM(S): at 22:19

## 2024-06-02 RX ADMIN — MESALAMINE 250 MILLIGRAM(S): 500 CAPSULE ORAL at 11:31

## 2024-06-02 RX ADMIN — ISOSORBIDE DINITRATE 30 MILLIGRAM(S): 5 TABLET ORAL at 10:28

## 2024-06-02 RX ADMIN — DIAZEPAM 5 MILLIGRAM(S): 10 TABLET ORAL at 05:16

## 2024-06-02 RX ADMIN — POLYETHYLENE GLYCOL 3350 17 GRAM(S): 1 POWDER ORAL at 05:15

## 2024-06-02 RX ADMIN — ATORVASTATIN CALCIUM 40 MILLIGRAM(S): 20 TABLET, FILM COATED ORAL at 22:19

## 2024-06-02 RX ADMIN — HYDROMORPHONE HCL 30 MILLILITER(S): 0.2 INJECTION, SOLUTION INTRAVENOUS at 18:08

## 2024-06-02 RX ADMIN — ONDANSETRON HYDROCHLORIDE 4 MILLIGRAM(S): 2 INJECTION INTRAMUSCULAR; INTRAVENOUS at 17:21

## 2024-06-02 RX ADMIN — INSULIN LISPRO 6: 100 INJECTION, SOLUTION SUBCUTANEOUS at 12:04

## 2024-06-02 RX ADMIN — Medication 50 MILLIGRAM(S): at 17:21

## 2024-06-02 RX ADMIN — Medication 1000 MILLIGRAM(S): at 13:16

## 2024-06-02 RX ADMIN — INSULIN LISPRO 2: 100 INJECTION, SOLUTION SUBCUTANEOUS at 07:33

## 2024-06-02 RX ADMIN — RANOLAZINE 500 MILLIGRAM(S): 500 TABLET, EXTENDED RELEASE ORAL at 17:21

## 2024-06-02 RX ADMIN — DIAZEPAM 5 MILLIGRAM(S): 10 TABLET ORAL at 13:16

## 2024-06-02 RX ADMIN — MESALAMINE 250 MILLIGRAM(S): 500 CAPSULE ORAL at 17:21

## 2024-06-02 RX ADMIN — Medication 1000 MILLIGRAM(S): at 22:19

## 2024-06-02 RX ADMIN — MESALAMINE 250 MILLIGRAM(S): 500 CAPSULE ORAL at 00:59

## 2024-06-02 RX ADMIN — ASPIRIN 81 MILLIGRAM(S): 325 TABLET, FILM COATED ORAL at 11:31

## 2024-06-02 RX ADMIN — INSULIN LISPRO 6: 100 INJECTION, SOLUTION SUBCUTANEOUS at 17:20

## 2024-06-02 RX ADMIN — LORATADINE 10 MILLIGRAM(S): 10 TABLET ORAL at 11:31

## 2024-06-02 RX ADMIN — MESALAMINE 250 MILLIGRAM(S): 500 CAPSULE ORAL at 05:12

## 2024-06-02 RX ADMIN — INSULIN LISPRO 6: 100 INJECTION, SOLUTION SUBCUTANEOUS at 22:18

## 2024-06-02 RX ADMIN — ONDANSETRON HYDROCHLORIDE 4 MILLIGRAM(S): 2 INJECTION INTRAMUSCULAR; INTRAVENOUS at 05:16

## 2024-06-02 RX ADMIN — HYDROMORPHONE HCL 2 MILLIGRAM(S): 0.2 INJECTION, SOLUTION INTRAVENOUS at 05:42

## 2024-06-02 RX ADMIN — Medication 10 MILLIGRAM(S): at 05:16

## 2024-06-02 RX ADMIN — DEXAMETHASONE 6 MILLIGRAM(S): 1 TABLET ORAL at 11:31

## 2024-06-02 RX ADMIN — Medication 10 MILLIGRAM(S): at 13:16

## 2024-06-02 RX ADMIN — DEXAMETHASONE 6 MILLIGRAM(S): 1 TABLET ORAL at 01:00

## 2024-06-02 RX ADMIN — HYDROMORPHONE HCL 30 MILLILITER(S): 0.2 INJECTION, SOLUTION INTRAVENOUS at 19:55

## 2024-06-02 RX ADMIN — ONDANSETRON HYDROCHLORIDE 4 MILLIGRAM(S): 2 INJECTION INTRAMUSCULAR; INTRAVENOUS at 11:31

## 2024-06-02 RX ADMIN — Medication 2 TABLET(S): at 22:19

## 2024-06-02 RX ADMIN — PANTOPRAZOLE SODIUM 40 MILLIGRAM(S): 40 INJECTION, POWDER, FOR SOLUTION INTRAVENOUS at 05:16

## 2024-06-02 RX ADMIN — Medication 800 MILLIGRAM(S): at 13:16

## 2024-06-02 RX ADMIN — INSULIN LISPRO 3 UNIT(S): 100 INJECTION, SOLUTION SUBCUTANEOUS at 07:33

## 2024-06-02 RX ADMIN — DEXAMETHASONE 6 MILLIGRAM(S): 1 TABLET ORAL at 17:20

## 2024-06-02 RX ADMIN — Medication 50 MILLIGRAM(S): at 05:16

## 2024-06-02 RX ADMIN — DIAZEPAM 5 MILLIGRAM(S): 10 TABLET ORAL at 22:19

## 2024-06-02 RX ADMIN — INSULIN GLARGINE 10 UNIT(S): 100 INJECTION, SOLUTION SUBCUTANEOUS at 22:18

## 2024-06-02 RX ADMIN — DEXAMETHASONE 6 MILLIGRAM(S): 1 TABLET ORAL at 05:16

## 2024-06-02 RX ADMIN — INSULIN LISPRO 3 UNIT(S): 100 INJECTION, SOLUTION SUBCUTANEOUS at 12:04

## 2024-06-02 RX ADMIN — HYDROMORPHONE HCL 2 MILLIGRAM(S): 0.2 INJECTION, SOLUTION INTRAVENOUS at 04:52

## 2024-06-02 RX ADMIN — ENOXAPARIN SODIUM 40 MILLIGRAM(S): 100 INJECTION SUBCUTANEOUS at 17:22

## 2024-06-03 LAB
ANION GAP SERPL CALC-SCNC: 15 MMOL/L — SIGNIFICANT CHANGE UP (ref 5–17)
BUN SERPL-MCNC: 18 MG/DL — SIGNIFICANT CHANGE UP (ref 7–23)
CALCIUM SERPL-MCNC: 8.2 MG/DL — LOW (ref 8.4–10.5)
CHLORIDE SERPL-SCNC: 98 MMOL/L — SIGNIFICANT CHANGE UP (ref 96–108)
CO2 SERPL-SCNC: 24 MMOL/L — SIGNIFICANT CHANGE UP (ref 22–31)
CREAT SERPL-MCNC: 0.73 MG/DL — SIGNIFICANT CHANGE UP (ref 0.5–1.3)
EGFR: 106 ML/MIN/1.73M2 — SIGNIFICANT CHANGE UP
GLUCOSE BLDC GLUCOMTR-MCNC: 204 MG/DL — HIGH (ref 70–99)
GLUCOSE BLDC GLUCOMTR-MCNC: 229 MG/DL — HIGH (ref 70–99)
GLUCOSE BLDC GLUCOMTR-MCNC: 251 MG/DL — HIGH (ref 70–99)
GLUCOSE BLDC GLUCOMTR-MCNC: 285 MG/DL — HIGH (ref 70–99)
GLUCOSE SERPL-MCNC: 314 MG/DL — HIGH (ref 70–99)
HCT VFR BLD CALC: 37.5 % — LOW (ref 39–50)
HGB BLD-MCNC: 12.6 G/DL — LOW (ref 13–17)
MCHC RBC-ENTMCNC: 29 PG — SIGNIFICANT CHANGE UP (ref 27–34)
MCHC RBC-ENTMCNC: 33.6 GM/DL — SIGNIFICANT CHANGE UP (ref 32–36)
MCV RBC AUTO: 86.4 FL — SIGNIFICANT CHANGE UP (ref 80–100)
NRBC # BLD: 0 /100 WBCS — SIGNIFICANT CHANGE UP (ref 0–0)
PLATELET # BLD AUTO: 150 K/UL — SIGNIFICANT CHANGE UP (ref 150–400)
POTASSIUM SERPL-MCNC: 4.1 MMOL/L — SIGNIFICANT CHANGE UP (ref 3.5–5.3)
POTASSIUM SERPL-SCNC: 4.1 MMOL/L — SIGNIFICANT CHANGE UP (ref 3.5–5.3)
RBC # BLD: 4.34 M/UL — SIGNIFICANT CHANGE UP (ref 4.2–5.8)
RBC # FLD: 13.6 % — SIGNIFICANT CHANGE UP (ref 10.3–14.5)
SODIUM SERPL-SCNC: 137 MMOL/L — SIGNIFICANT CHANGE UP (ref 135–145)
WBC # BLD: 14.53 K/UL — HIGH (ref 3.8–10.5)
WBC # FLD AUTO: 14.53 K/UL — HIGH (ref 3.8–10.5)

## 2024-06-03 PROCEDURE — 99233 SBSQ HOSP IP/OBS HIGH 50: CPT

## 2024-06-03 RX ORDER — HYDROMORPHONE HCL 0.2 MG/ML
30 INJECTION, SOLUTION INTRAVENOUS
Refills: 0 | Status: DISCONTINUED | OUTPATIENT
Start: 2024-06-03 | End: 2024-06-03

## 2024-06-03 RX ORDER — HYDROMORPHONE HCL 0.2 MG/ML
2 INJECTION, SOLUTION INTRAVENOUS EVERY 4 HOURS
Refills: 0 | Status: DISCONTINUED | OUTPATIENT
Start: 2024-06-03 | End: 2024-06-04

## 2024-06-03 RX ORDER — HYDROMORPHONE HCL 0.2 MG/ML
30 INJECTION, SOLUTION INTRAVENOUS
Refills: 0 | Status: DISCONTINUED | OUTPATIENT
Start: 2024-06-03 | End: 2024-06-04

## 2024-06-03 RX ADMIN — DIAZEPAM 5 MILLIGRAM(S): 10 TABLET ORAL at 21:22

## 2024-06-03 RX ADMIN — HYDROMORPHONE HCL 2 MILLIGRAM(S): 0.2 INJECTION, SOLUTION INTRAVENOUS at 00:49

## 2024-06-03 RX ADMIN — HYDROMORPHONE HCL 2 MILLIGRAM(S): 0.2 INJECTION, SOLUTION INTRAVENOUS at 19:55

## 2024-06-03 RX ADMIN — Medication 5 MILLIGRAM(S): at 21:22

## 2024-06-03 RX ADMIN — ISOSORBIDE DINITRATE 30 MILLIGRAM(S): 5 TABLET ORAL at 10:37

## 2024-06-03 RX ADMIN — INSULIN LISPRO 3 UNIT(S): 100 INJECTION, SOLUTION SUBCUTANEOUS at 06:59

## 2024-06-03 RX ADMIN — LEVETIRACETAM 250 MILLIGRAM(S): 100 INJECTION INTRAVENOUS at 06:17

## 2024-06-03 RX ADMIN — POLYETHYLENE GLYCOL 3350 17 GRAM(S): 1 POWDER ORAL at 17:10

## 2024-06-03 RX ADMIN — DEXAMETHASONE 6 MILLIGRAM(S): 1 TABLET ORAL at 00:33

## 2024-06-03 RX ADMIN — Medication 800 MILLIGRAM(S): at 13:14

## 2024-06-03 RX ADMIN — DIAZEPAM 5 MILLIGRAM(S): 10 TABLET ORAL at 06:18

## 2024-06-03 RX ADMIN — ENOXAPARIN SODIUM 40 MILLIGRAM(S): 100 INJECTION SUBCUTANEOUS at 06:17

## 2024-06-03 RX ADMIN — ASPIRIN 81 MILLIGRAM(S): 325 TABLET, FILM COATED ORAL at 11:51

## 2024-06-03 RX ADMIN — MESALAMINE 250 MILLIGRAM(S): 500 CAPSULE ORAL at 17:09

## 2024-06-03 RX ADMIN — LORATADINE 10 MILLIGRAM(S): 10 TABLET ORAL at 11:52

## 2024-06-03 RX ADMIN — DEXAMETHASONE 6 MILLIGRAM(S): 1 TABLET ORAL at 11:52

## 2024-06-03 RX ADMIN — MESALAMINE 250 MILLIGRAM(S): 500 CAPSULE ORAL at 11:51

## 2024-06-03 RX ADMIN — RANOLAZINE 500 MILLIGRAM(S): 500 TABLET, EXTENDED RELEASE ORAL at 17:09

## 2024-06-03 RX ADMIN — ONDANSETRON HYDROCHLORIDE 4 MILLIGRAM(S): 2 INJECTION INTRAMUSCULAR; INTRAVENOUS at 00:33

## 2024-06-03 RX ADMIN — HYDROMORPHONE HCL 2 MILLIGRAM(S): 0.2 INJECTION, SOLUTION INTRAVENOUS at 04:49

## 2024-06-03 RX ADMIN — Medication 50 MILLIGRAM(S): at 17:09

## 2024-06-03 RX ADMIN — INSULIN LISPRO 3 UNIT(S): 100 INJECTION, SOLUTION SUBCUTANEOUS at 12:32

## 2024-06-03 RX ADMIN — INSULIN GLARGINE 10 UNIT(S): 100 INJECTION, SOLUTION SUBCUTANEOUS at 22:05

## 2024-06-03 RX ADMIN — HYDROMORPHONE HCL 2 MILLIGRAM(S): 0.2 INJECTION, SOLUTION INTRAVENOUS at 09:10

## 2024-06-03 RX ADMIN — INSULIN LISPRO 6: 100 INJECTION, SOLUTION SUBCUTANEOUS at 22:05

## 2024-06-03 RX ADMIN — INSULIN LISPRO 3 UNIT(S): 100 INJECTION, SOLUTION SUBCUTANEOUS at 16:32

## 2024-06-03 RX ADMIN — MESALAMINE 250 MILLIGRAM(S): 500 CAPSULE ORAL at 00:33

## 2024-06-03 RX ADMIN — MESALAMINE 250 MILLIGRAM(S): 500 CAPSULE ORAL at 06:19

## 2024-06-03 RX ADMIN — Medication 10 MILLIGRAM(S): at 13:13

## 2024-06-03 RX ADMIN — Medication 10 MILLIGRAM(S): at 21:22

## 2024-06-03 RX ADMIN — ONDANSETRON HYDROCHLORIDE 4 MILLIGRAM(S): 2 INJECTION INTRAMUSCULAR; INTRAVENOUS at 06:18

## 2024-06-03 RX ADMIN — Medication 1000 MILLIGRAM(S): at 21:22

## 2024-06-03 RX ADMIN — Medication 2 TABLET(S): at 21:22

## 2024-06-03 RX ADMIN — HYDROMORPHONE HCL 2 MILLIGRAM(S): 0.2 INJECTION, SOLUTION INTRAVENOUS at 05:08

## 2024-06-03 RX ADMIN — Medication 10 MILLIGRAM(S): at 06:19

## 2024-06-03 RX ADMIN — Medication 1000 MILLIGRAM(S): at 06:18

## 2024-06-03 RX ADMIN — HYDROMORPHONE HCL 2 MILLIGRAM(S): 0.2 INJECTION, SOLUTION INTRAVENOUS at 01:35

## 2024-06-03 RX ADMIN — INSULIN LISPRO 6: 100 INJECTION, SOLUTION SUBCUTANEOUS at 16:32

## 2024-06-03 RX ADMIN — INSULIN LISPRO 4: 100 INJECTION, SOLUTION SUBCUTANEOUS at 07:00

## 2024-06-03 RX ADMIN — ATORVASTATIN CALCIUM 40 MILLIGRAM(S): 20 TABLET, FILM COATED ORAL at 21:21

## 2024-06-03 RX ADMIN — DEXAMETHASONE 6 MILLIGRAM(S): 1 TABLET ORAL at 06:18

## 2024-06-03 RX ADMIN — DIAZEPAM 5 MILLIGRAM(S): 10 TABLET ORAL at 13:14

## 2024-06-03 RX ADMIN — Medication 1000 MILLIGRAM(S): at 13:13

## 2024-06-03 RX ADMIN — Medication 800 MILLIGRAM(S): at 06:18

## 2024-06-03 RX ADMIN — POLYETHYLENE GLYCOL 3350 17 GRAM(S): 1 POWDER ORAL at 06:17

## 2024-06-03 RX ADMIN — HYDROMORPHONE HCL 2 MILLIGRAM(S): 0.2 INJECTION, SOLUTION INTRAVENOUS at 08:52

## 2024-06-03 RX ADMIN — Medication 800 MILLIGRAM(S): at 21:22

## 2024-06-03 RX ADMIN — PANTOPRAZOLE SODIUM 40 MILLIGRAM(S): 40 INJECTION, POWDER, FOR SOLUTION INTRAVENOUS at 06:19

## 2024-06-03 RX ADMIN — ENOXAPARIN SODIUM 40 MILLIGRAM(S): 100 INJECTION SUBCUTANEOUS at 17:09

## 2024-06-03 RX ADMIN — INSULIN LISPRO 4: 100 INJECTION, SOLUTION SUBCUTANEOUS at 12:31

## 2024-06-03 RX ADMIN — RANOLAZINE 500 MILLIGRAM(S): 500 TABLET, EXTENDED RELEASE ORAL at 06:19

## 2024-06-03 RX ADMIN — Medication 50 MILLIGRAM(S): at 06:19

## 2024-06-03 RX ADMIN — HYDROMORPHONE HCL 30 MILLILITER(S): 0.2 INJECTION, SOLUTION INTRAVENOUS at 09:33

## 2024-06-03 RX ADMIN — HYDROMORPHONE HCL 30 MILLILITER(S): 0.2 INJECTION, SOLUTION INTRAVENOUS at 18:52

## 2024-06-04 LAB
ANION GAP SERPL CALC-SCNC: 13 MMOL/L — SIGNIFICANT CHANGE UP (ref 5–17)
BUN SERPL-MCNC: 17 MG/DL — SIGNIFICANT CHANGE UP (ref 7–23)
CALCIUM SERPL-MCNC: 8.3 MG/DL — LOW (ref 8.4–10.5)
CHLORIDE SERPL-SCNC: 99 MMOL/L — SIGNIFICANT CHANGE UP (ref 96–108)
CO2 SERPL-SCNC: 27 MMOL/L — SIGNIFICANT CHANGE UP (ref 22–31)
CREAT SERPL-MCNC: 0.83 MG/DL — SIGNIFICANT CHANGE UP (ref 0.5–1.3)
EGFR: 102 ML/MIN/1.73M2 — SIGNIFICANT CHANGE UP
GLUCOSE BLDC GLUCOMTR-MCNC: 125 MG/DL — HIGH (ref 70–99)
GLUCOSE BLDC GLUCOMTR-MCNC: 165 MG/DL — HIGH (ref 70–99)
GLUCOSE BLDC GLUCOMTR-MCNC: 201 MG/DL — HIGH (ref 70–99)
GLUCOSE BLDC GLUCOMTR-MCNC: 218 MG/DL — HIGH (ref 70–99)
GLUCOSE SERPL-MCNC: 188 MG/DL — HIGH (ref 70–99)
HCT VFR BLD CALC: 40.4 % — SIGNIFICANT CHANGE UP (ref 39–50)
HGB BLD-MCNC: 13.2 G/DL — SIGNIFICANT CHANGE UP (ref 13–17)
MCHC RBC-ENTMCNC: 28.7 PG — SIGNIFICANT CHANGE UP (ref 27–34)
MCHC RBC-ENTMCNC: 32.7 GM/DL — SIGNIFICANT CHANGE UP (ref 32–36)
MCV RBC AUTO: 87.8 FL — SIGNIFICANT CHANGE UP (ref 80–100)
NRBC # BLD: 0 /100 WBCS — SIGNIFICANT CHANGE UP (ref 0–0)
PLATELET # BLD AUTO: 172 K/UL — SIGNIFICANT CHANGE UP (ref 150–400)
POTASSIUM SERPL-MCNC: 3.9 MMOL/L — SIGNIFICANT CHANGE UP (ref 3.5–5.3)
POTASSIUM SERPL-SCNC: 3.9 MMOL/L — SIGNIFICANT CHANGE UP (ref 3.5–5.3)
RBC # BLD: 4.6 M/UL — SIGNIFICANT CHANGE UP (ref 4.2–5.8)
RBC # FLD: 13.7 % — SIGNIFICANT CHANGE UP (ref 10.3–14.5)
SODIUM SERPL-SCNC: 139 MMOL/L — SIGNIFICANT CHANGE UP (ref 135–145)
WBC # BLD: 19.6 K/UL — HIGH (ref 3.8–10.5)
WBC # FLD AUTO: 19.6 K/UL — HIGH (ref 3.8–10.5)

## 2024-06-04 PROCEDURE — 99233 SBSQ HOSP IP/OBS HIGH 50: CPT

## 2024-06-04 RX ORDER — DIAZEPAM 10 MG/1
5 TABLET ORAL EVERY 8 HOURS
Refills: 0 | Status: DISCONTINUED | OUTPATIENT
Start: 2024-06-04 | End: 2024-06-11

## 2024-06-04 RX ORDER — OXYCODONE HYDROCHLORIDE 100 MG/5ML
60 SOLUTION ORAL EVERY 4 HOURS
Refills: 0 | Status: DISCONTINUED | OUTPATIENT
Start: 2024-06-04 | End: 2024-06-05

## 2024-06-04 RX ORDER — OXYCODONE HYDROCHLORIDE 100 MG/5ML
60 SOLUTION ORAL
Refills: 0 | Status: DISCONTINUED | OUTPATIENT
Start: 2024-06-04 | End: 2024-06-04

## 2024-06-04 RX ORDER — HYDROMORPHONE HCL 0.2 MG/ML
2 INJECTION, SOLUTION INTRAVENOUS
Refills: 0 | Status: DISCONTINUED | OUTPATIENT
Start: 2024-06-04 | End: 2024-06-05

## 2024-06-04 RX ORDER — HYDROMORPHONE HCL 0.2 MG/ML
2 INJECTION, SOLUTION INTRAVENOUS
Refills: 0 | Status: DISCONTINUED | OUTPATIENT
Start: 2024-06-04 | End: 2024-06-04

## 2024-06-04 RX ADMIN — INSULIN LISPRO 3 UNIT(S): 100 INJECTION, SOLUTION SUBCUTANEOUS at 07:46

## 2024-06-04 RX ADMIN — ONDANSETRON HYDROCHLORIDE 4 MILLIGRAM(S): 2 INJECTION INTRAMUSCULAR; INTRAVENOUS at 05:29

## 2024-06-04 RX ADMIN — MESALAMINE 250 MILLIGRAM(S): 500 CAPSULE ORAL at 11:33

## 2024-06-04 RX ADMIN — INSULIN LISPRO 2: 100 INJECTION, SOLUTION SUBCUTANEOUS at 22:06

## 2024-06-04 RX ADMIN — Medication 50 MILLIGRAM(S): at 17:25

## 2024-06-04 RX ADMIN — INSULIN LISPRO 4: 100 INJECTION, SOLUTION SUBCUTANEOUS at 11:34

## 2024-06-04 RX ADMIN — HYDROMORPHONE HCL 2 MILLIGRAM(S): 0.2 INJECTION, SOLUTION INTRAVENOUS at 04:05

## 2024-06-04 RX ADMIN — OXYCODONE HYDROCHLORIDE 60 MILLIGRAM(S): 100 SOLUTION ORAL at 20:06

## 2024-06-04 RX ADMIN — Medication 800 MILLIGRAM(S): at 21:05

## 2024-06-04 RX ADMIN — Medication 1000 MILLIGRAM(S): at 05:29

## 2024-06-04 RX ADMIN — ONDANSETRON HYDROCHLORIDE 4 MILLIGRAM(S): 2 INJECTION INTRAMUSCULAR; INTRAVENOUS at 11:34

## 2024-06-04 RX ADMIN — PANTOPRAZOLE SODIUM 40 MILLIGRAM(S): 40 INJECTION, POWDER, FOR SOLUTION INTRAVENOUS at 06:24

## 2024-06-04 RX ADMIN — HYDROMORPHONE HCL 2 MILLIGRAM(S): 0.2 INJECTION, SOLUTION INTRAVENOUS at 22:06

## 2024-06-04 RX ADMIN — OXYCODONE HYDROCHLORIDE 60 MILLIGRAM(S): 100 SOLUTION ORAL at 16:25

## 2024-06-04 RX ADMIN — ENOXAPARIN SODIUM 40 MILLIGRAM(S): 100 INJECTION SUBCUTANEOUS at 05:30

## 2024-06-04 RX ADMIN — RANOLAZINE 500 MILLIGRAM(S): 500 TABLET, EXTENDED RELEASE ORAL at 17:24

## 2024-06-04 RX ADMIN — DIAZEPAM 5 MILLIGRAM(S): 10 TABLET ORAL at 05:28

## 2024-06-04 RX ADMIN — Medication 800 MILLIGRAM(S): at 05:28

## 2024-06-04 RX ADMIN — HYDROMORPHONE HCL 2 MILLIGRAM(S): 0.2 INJECTION, SOLUTION INTRAVENOUS at 21:06

## 2024-06-04 RX ADMIN — HYDROMORPHONE HCL 2 MILLIGRAM(S): 0.2 INJECTION, SOLUTION INTRAVENOUS at 23:11

## 2024-06-04 RX ADMIN — Medication 1000 MILLIGRAM(S): at 13:51

## 2024-06-04 RX ADMIN — ASPIRIN 81 MILLIGRAM(S): 325 TABLET, FILM COATED ORAL at 11:33

## 2024-06-04 RX ADMIN — OXYCODONE HYDROCHLORIDE 60 MILLIGRAM(S): 100 SOLUTION ORAL at 21:06

## 2024-06-04 RX ADMIN — ATORVASTATIN CALCIUM 40 MILLIGRAM(S): 20 TABLET, FILM COATED ORAL at 21:06

## 2024-06-04 RX ADMIN — DIAZEPAM 5 MILLIGRAM(S): 10 TABLET ORAL at 21:06

## 2024-06-04 RX ADMIN — Medication 50 MILLIGRAM(S): at 05:29

## 2024-06-04 RX ADMIN — DIAZEPAM 5 MILLIGRAM(S): 10 TABLET ORAL at 13:51

## 2024-06-04 RX ADMIN — MESALAMINE 250 MILLIGRAM(S): 500 CAPSULE ORAL at 17:24

## 2024-06-04 RX ADMIN — Medication 10 MILLIGRAM(S): at 21:05

## 2024-06-04 RX ADMIN — MESALAMINE 250 MILLIGRAM(S): 500 CAPSULE ORAL at 05:29

## 2024-06-04 RX ADMIN — OXYCODONE HYDROCHLORIDE 60 MILLIGRAM(S): 100 SOLUTION ORAL at 17:25

## 2024-06-04 RX ADMIN — Medication 10 MILLIGRAM(S): at 13:51

## 2024-06-04 RX ADMIN — INSULIN GLARGINE 10 UNIT(S): 100 INJECTION, SOLUTION SUBCUTANEOUS at 22:05

## 2024-06-04 RX ADMIN — Medication 10 MILLIGRAM(S): at 05:29

## 2024-06-04 RX ADMIN — ENOXAPARIN SODIUM 40 MILLIGRAM(S): 100 INJECTION SUBCUTANEOUS at 17:25

## 2024-06-04 RX ADMIN — INSULIN LISPRO 3 UNIT(S): 100 INJECTION, SOLUTION SUBCUTANEOUS at 16:03

## 2024-06-04 RX ADMIN — POLYETHYLENE GLYCOL 3350 17 GRAM(S): 1 POWDER ORAL at 05:30

## 2024-06-04 RX ADMIN — ISOSORBIDE DINITRATE 30 MILLIGRAM(S): 5 TABLET ORAL at 09:09

## 2024-06-04 RX ADMIN — Medication 800 MILLIGRAM(S): at 13:51

## 2024-06-04 RX ADMIN — INSULIN LISPRO 3 UNIT(S): 100 INJECTION, SOLUTION SUBCUTANEOUS at 11:34

## 2024-06-04 RX ADMIN — LORATADINE 10 MILLIGRAM(S): 10 TABLET ORAL at 11:34

## 2024-06-04 RX ADMIN — INSULIN LISPRO 4: 100 INJECTION, SOLUTION SUBCUTANEOUS at 07:46

## 2024-06-04 RX ADMIN — ONDANSETRON HYDROCHLORIDE 4 MILLIGRAM(S): 2 INJECTION INTRAMUSCULAR; INTRAVENOUS at 17:25

## 2024-06-04 RX ADMIN — ONDANSETRON HYDROCHLORIDE 4 MILLIGRAM(S): 2 INJECTION INTRAMUSCULAR; INTRAVENOUS at 00:22

## 2024-06-04 RX ADMIN — RANOLAZINE 500 MILLIGRAM(S): 500 TABLET, EXTENDED RELEASE ORAL at 05:29

## 2024-06-04 RX ADMIN — POLYETHYLENE GLYCOL 3350 17 GRAM(S): 1 POWDER ORAL at 17:24

## 2024-06-04 RX ADMIN — Medication 1000 MILLIGRAM(S): at 21:06

## 2024-06-04 RX ADMIN — MESALAMINE 250 MILLIGRAM(S): 500 CAPSULE ORAL at 00:22

## 2024-06-05 DIAGNOSIS — B02.9 ZOSTER WITHOUT COMPLICATIONS: ICD-10-CM

## 2024-06-05 LAB
ANION GAP SERPL CALC-SCNC: 11 MMOL/L — SIGNIFICANT CHANGE UP (ref 5–17)
BUN SERPL-MCNC: 16 MG/DL — SIGNIFICANT CHANGE UP (ref 7–23)
CALCIUM SERPL-MCNC: 8.1 MG/DL — LOW (ref 8.4–10.5)
CHLORIDE SERPL-SCNC: 97 MMOL/L — SIGNIFICANT CHANGE UP (ref 96–108)
CO2 SERPL-SCNC: 29 MMOL/L — SIGNIFICANT CHANGE UP (ref 22–31)
CREAT SERPL-MCNC: 0.53 MG/DL — SIGNIFICANT CHANGE UP (ref 0.5–1.3)
EGFR: 117 ML/MIN/1.73M2 — SIGNIFICANT CHANGE UP
GLUCOSE BLDC GLUCOMTR-MCNC: 112 MG/DL — HIGH (ref 70–99)
GLUCOSE BLDC GLUCOMTR-MCNC: 117 MG/DL — HIGH (ref 70–99)
GLUCOSE BLDC GLUCOMTR-MCNC: 151 MG/DL — HIGH (ref 70–99)
GLUCOSE BLDC GLUCOMTR-MCNC: 191 MG/DL — HIGH (ref 70–99)
GLUCOSE SERPL-MCNC: 124 MG/DL — HIGH (ref 70–99)
HCT VFR BLD CALC: 40.2 % — SIGNIFICANT CHANGE UP (ref 39–50)
HGB BLD-MCNC: 13.4 G/DL — SIGNIFICANT CHANGE UP (ref 13–17)
MCHC RBC-ENTMCNC: 29 PG — SIGNIFICANT CHANGE UP (ref 27–34)
MCHC RBC-ENTMCNC: 33.3 GM/DL — SIGNIFICANT CHANGE UP (ref 32–36)
MCV RBC AUTO: 87 FL — SIGNIFICANT CHANGE UP (ref 80–100)
NRBC # BLD: 0 /100 WBCS — SIGNIFICANT CHANGE UP (ref 0–0)
PLATELET # BLD AUTO: 134 K/UL — LOW (ref 150–400)
POTASSIUM SERPL-MCNC: 3.9 MMOL/L — SIGNIFICANT CHANGE UP (ref 3.5–5.3)
POTASSIUM SERPL-SCNC: 3.9 MMOL/L — SIGNIFICANT CHANGE UP (ref 3.5–5.3)
RBC # BLD: 4.62 M/UL — SIGNIFICANT CHANGE UP (ref 4.2–5.8)
RBC # FLD: 14 % — SIGNIFICANT CHANGE UP (ref 10.3–14.5)
SODIUM SERPL-SCNC: 137 MMOL/L — SIGNIFICANT CHANGE UP (ref 135–145)
WBC # BLD: 18.35 K/UL — HIGH (ref 3.8–10.5)
WBC # FLD AUTO: 18.35 K/UL — HIGH (ref 3.8–10.5)

## 2024-06-05 PROCEDURE — 99233 SBSQ HOSP IP/OBS HIGH 50: CPT

## 2024-06-05 RX ORDER — HYDROMORPHONE HCL 0.2 MG/ML
1 INJECTION, SOLUTION INTRAVENOUS
Refills: 0 | Status: DISCONTINUED | OUTPATIENT
Start: 2024-06-05 | End: 2024-06-12

## 2024-06-05 RX ORDER — FUROSEMIDE 10 MG/ML
20 INJECTION, SOLUTION INTRAMUSCULAR; INTRAVENOUS ONCE
Refills: 0 | Status: COMPLETED | OUTPATIENT
Start: 2024-06-05 | End: 2024-06-05

## 2024-06-05 RX ORDER — FUROSEMIDE 10 MG/ML
20 INJECTION, SOLUTION INTRAMUSCULAR; INTRAVENOUS ONCE
Refills: 0 | Status: DISCONTINUED | OUTPATIENT
Start: 2024-06-05 | End: 2024-06-05

## 2024-06-05 RX ORDER — VALACYCLOVIR HYDROCHLORIDE 500 MG/1
1000 TABLET, FILM COATED ORAL EVERY 8 HOURS
Refills: 0 | Status: DISCONTINUED | OUTPATIENT
Start: 2024-06-05 | End: 2024-06-07

## 2024-06-05 RX ORDER — OXYCODONE HYDROCHLORIDE 100 MG/5ML
60 SOLUTION ORAL
Refills: 0 | Status: DISCONTINUED | OUTPATIENT
Start: 2024-06-05 | End: 2024-06-08

## 2024-06-05 RX ORDER — OXYCODONE HYDROCHLORIDE 100 MG/5ML
60 SOLUTION ORAL
Refills: 0 | Status: DISCONTINUED | OUTPATIENT
Start: 2024-06-05 | End: 2024-06-05

## 2024-06-05 RX ORDER — INSULIN GLARGINE 100 [IU]/ML
5 INJECTION, SOLUTION SUBCUTANEOUS AT BEDTIME
Refills: 0 | Status: DISCONTINUED | OUTPATIENT
Start: 2024-06-05 | End: 2024-06-20

## 2024-06-05 RX ADMIN — FUROSEMIDE 20 MILLIGRAM(S): 10 INJECTION, SOLUTION INTRAMUSCULAR; INTRAVENOUS at 12:29

## 2024-06-05 RX ADMIN — OXYCODONE HYDROCHLORIDE 60 MILLIGRAM(S): 100 SOLUTION ORAL at 09:46

## 2024-06-05 RX ADMIN — Medication 1000 MILLIGRAM(S): at 14:16

## 2024-06-05 RX ADMIN — Medication 10 MILLIGRAM(S): at 14:17

## 2024-06-05 RX ADMIN — ENOXAPARIN SODIUM 40 MILLIGRAM(S): 100 INJECTION SUBCUTANEOUS at 05:42

## 2024-06-05 RX ADMIN — HYDROMORPHONE HCL 2 MILLIGRAM(S): 0.2 INJECTION, SOLUTION INTRAVENOUS at 03:37

## 2024-06-05 RX ADMIN — OXYCODONE HYDROCHLORIDE 60 MILLIGRAM(S): 100 SOLUTION ORAL at 05:51

## 2024-06-05 RX ADMIN — OXYCODONE HYDROCHLORIDE 60 MILLIGRAM(S): 100 SOLUTION ORAL at 12:49

## 2024-06-05 RX ADMIN — Medication 800 MILLIGRAM(S): at 14:16

## 2024-06-05 RX ADMIN — MESALAMINE 250 MILLIGRAM(S): 500 CAPSULE ORAL at 12:29

## 2024-06-05 RX ADMIN — RANOLAZINE 500 MILLIGRAM(S): 500 TABLET, EXTENDED RELEASE ORAL at 05:42

## 2024-06-05 RX ADMIN — INSULIN LISPRO 3 UNIT(S): 100 INJECTION, SOLUTION SUBCUTANEOUS at 07:34

## 2024-06-05 RX ADMIN — PANTOPRAZOLE SODIUM 40 MILLIGRAM(S): 40 INJECTION, POWDER, FOR SOLUTION INTRAVENOUS at 05:43

## 2024-06-05 RX ADMIN — INSULIN LISPRO 2: 100 INJECTION, SOLUTION SUBCUTANEOUS at 12:51

## 2024-06-05 RX ADMIN — ASPIRIN 81 MILLIGRAM(S): 325 TABLET, FILM COATED ORAL at 12:30

## 2024-06-05 RX ADMIN — Medication 1000 MILLIGRAM(S): at 23:26

## 2024-06-05 RX ADMIN — Medication 10 MILLIGRAM(S): at 22:28

## 2024-06-05 RX ADMIN — POLYETHYLENE GLYCOL 3350 17 GRAM(S): 1 POWDER ORAL at 18:55

## 2024-06-05 RX ADMIN — INSULIN GLARGINE 5 UNIT(S): 100 INJECTION, SOLUTION SUBCUTANEOUS at 22:28

## 2024-06-05 RX ADMIN — DIAZEPAM 5 MILLIGRAM(S): 10 TABLET ORAL at 05:43

## 2024-06-05 RX ADMIN — Medication 800 MILLIGRAM(S): at 22:27

## 2024-06-05 RX ADMIN — OXYCODONE HYDROCHLORIDE 60 MILLIGRAM(S): 100 SOLUTION ORAL at 00:27

## 2024-06-05 RX ADMIN — OXYCODONE HYDROCHLORIDE 60 MILLIGRAM(S): 100 SOLUTION ORAL at 01:27

## 2024-06-05 RX ADMIN — Medication 2 TABLET(S): at 22:27

## 2024-06-05 RX ADMIN — OXYCODONE HYDROCHLORIDE 60 MILLIGRAM(S): 100 SOLUTION ORAL at 19:43

## 2024-06-05 RX ADMIN — HYDROMORPHONE HCL 2 MILLIGRAM(S): 0.2 INJECTION, SOLUTION INTRAVENOUS at 06:09

## 2024-06-05 RX ADMIN — HYDROMORPHONE HCL 2 MILLIGRAM(S): 0.2 INJECTION, SOLUTION INTRAVENOUS at 00:27

## 2024-06-05 RX ADMIN — Medication 10 MILLIGRAM(S): at 05:42

## 2024-06-05 RX ADMIN — DIAZEPAM 5 MILLIGRAM(S): 10 TABLET ORAL at 22:27

## 2024-06-05 RX ADMIN — HYDROMORPHONE HCL 2 MILLIGRAM(S): 0.2 INJECTION, SOLUTION INTRAVENOUS at 04:51

## 2024-06-05 RX ADMIN — Medication 1000 MILLIGRAM(S): at 22:27

## 2024-06-05 RX ADMIN — ONDANSETRON HYDROCHLORIDE 4 MILLIGRAM(S): 2 INJECTION INTRAMUSCULAR; INTRAVENOUS at 23:16

## 2024-06-05 RX ADMIN — Medication 50 MILLIGRAM(S): at 05:42

## 2024-06-05 RX ADMIN — OXYCODONE HYDROCHLORIDE 60 MILLIGRAM(S): 100 SOLUTION ORAL at 20:43

## 2024-06-05 RX ADMIN — ENOXAPARIN SODIUM 40 MILLIGRAM(S): 100 INJECTION SUBCUTANEOUS at 18:55

## 2024-06-05 RX ADMIN — HYDROMORPHONE HCL 2 MILLIGRAM(S): 0.2 INJECTION, SOLUTION INTRAVENOUS at 07:09

## 2024-06-05 RX ADMIN — VALACYCLOVIR HYDROCHLORIDE 1000 MILLIGRAM(S): 500 TABLET, FILM COATED ORAL at 12:29

## 2024-06-05 RX ADMIN — OXYCODONE HYDROCHLORIDE 60 MILLIGRAM(S): 100 SOLUTION ORAL at 23:16

## 2024-06-05 RX ADMIN — INSULIN LISPRO 2: 100 INJECTION, SOLUTION SUBCUTANEOUS at 23:28

## 2024-06-05 RX ADMIN — OXYCODONE HYDROCHLORIDE 60 MILLIGRAM(S): 100 SOLUTION ORAL at 04:51

## 2024-06-05 RX ADMIN — RANOLAZINE 500 MILLIGRAM(S): 500 TABLET, EXTENDED RELEASE ORAL at 18:56

## 2024-06-05 RX ADMIN — Medication 50 MILLIGRAM(S): at 18:56

## 2024-06-05 RX ADMIN — ISOSORBIDE DINITRATE 30 MILLIGRAM(S): 5 TABLET ORAL at 09:46

## 2024-06-05 RX ADMIN — MESALAMINE 250 MILLIGRAM(S): 500 CAPSULE ORAL at 05:43

## 2024-06-05 RX ADMIN — Medication 5 MILLIGRAM(S): at 22:27

## 2024-06-05 RX ADMIN — MESALAMINE 250 MILLIGRAM(S): 500 CAPSULE ORAL at 00:27

## 2024-06-05 RX ADMIN — ATORVASTATIN CALCIUM 40 MILLIGRAM(S): 20 TABLET, FILM COATED ORAL at 22:27

## 2024-06-05 RX ADMIN — OXYCODONE HYDROCHLORIDE 60 MILLIGRAM(S): 100 SOLUTION ORAL at 16:25

## 2024-06-05 RX ADMIN — OXYCODONE HYDROCHLORIDE 60 MILLIGRAM(S): 100 SOLUTION ORAL at 17:25

## 2024-06-05 RX ADMIN — MESALAMINE 250 MILLIGRAM(S): 500 CAPSULE ORAL at 23:16

## 2024-06-05 RX ADMIN — Medication 800 MILLIGRAM(S): at 05:42

## 2024-06-05 RX ADMIN — DIAZEPAM 5 MILLIGRAM(S): 10 TABLET ORAL at 14:16

## 2024-06-05 RX ADMIN — OXYCODONE HYDROCHLORIDE 60 MILLIGRAM(S): 100 SOLUTION ORAL at 10:45

## 2024-06-05 RX ADMIN — LORATADINE 10 MILLIGRAM(S): 10 TABLET ORAL at 12:29

## 2024-06-05 RX ADMIN — Medication 1000 MILLIGRAM(S): at 05:42

## 2024-06-05 RX ADMIN — BENZOCAINE AND MENTHOL 1 LOZENGE: 15; 3.6 LOZENGE ORAL at 18:56

## 2024-06-05 RX ADMIN — MESALAMINE 250 MILLIGRAM(S): 500 CAPSULE ORAL at 18:55

## 2024-06-05 RX ADMIN — VALACYCLOVIR HYDROCHLORIDE 1000 MILLIGRAM(S): 500 TABLET, FILM COATED ORAL at 18:59

## 2024-06-06 LAB
ANION GAP SERPL CALC-SCNC: 13 MMOL/L — SIGNIFICANT CHANGE UP (ref 5–17)
ANISOCYTOSIS BLD QL: SLIGHT — SIGNIFICANT CHANGE UP
BASOPHILS # BLD AUTO: 0 K/UL — SIGNIFICANT CHANGE UP (ref 0–0.2)
BASOPHILS NFR BLD AUTO: 0 % — SIGNIFICANT CHANGE UP (ref 0–2)
BUN SERPL-MCNC: 15 MG/DL — SIGNIFICANT CHANGE UP (ref 7–23)
CALCIUM SERPL-MCNC: 8.6 MG/DL — SIGNIFICANT CHANGE UP (ref 8.4–10.5)
CHLORIDE SERPL-SCNC: 98 MMOL/L — SIGNIFICANT CHANGE UP (ref 96–108)
CO2 SERPL-SCNC: 31 MMOL/L — SIGNIFICANT CHANGE UP (ref 22–31)
CREAT SERPL-MCNC: 0.56 MG/DL — SIGNIFICANT CHANGE UP (ref 0.5–1.3)
DACRYOCYTES BLD QL SMEAR: SIGNIFICANT CHANGE UP
EGFR: 115 ML/MIN/1.73M2 — SIGNIFICANT CHANGE UP
EOSINOPHIL # BLD AUTO: 0.14 K/UL — SIGNIFICANT CHANGE UP (ref 0–0.5)
EOSINOPHIL NFR BLD AUTO: 0.9 % — SIGNIFICANT CHANGE UP (ref 0–6)
GLUCOSE BLDC GLUCOMTR-MCNC: 136 MG/DL — HIGH (ref 70–99)
GLUCOSE BLDC GLUCOMTR-MCNC: 145 MG/DL — HIGH (ref 70–99)
GLUCOSE BLDC GLUCOMTR-MCNC: 158 MG/DL — HIGH (ref 70–99)
GLUCOSE BLDC GLUCOMTR-MCNC: 170 MG/DL — HIGH (ref 70–99)
GLUCOSE SERPL-MCNC: 149 MG/DL — HIGH (ref 70–99)
HCT VFR BLD CALC: 41 % — SIGNIFICANT CHANGE UP (ref 39–50)
HGB BLD-MCNC: 13.4 G/DL — SIGNIFICANT CHANGE UP (ref 13–17)
LYMPHOCYTES # BLD AUTO: 16.1 % — SIGNIFICANT CHANGE UP (ref 13–44)
LYMPHOCYTES # BLD AUTO: 2.48 K/UL — SIGNIFICANT CHANGE UP (ref 1–3.3)
MACROCYTES BLD QL: SLIGHT — SIGNIFICANT CHANGE UP
MANUAL SMEAR VERIFICATION: SIGNIFICANT CHANGE UP
MCHC RBC-ENTMCNC: 28.7 PG — SIGNIFICANT CHANGE UP (ref 27–34)
MCHC RBC-ENTMCNC: 32.7 GM/DL — SIGNIFICANT CHANGE UP (ref 32–36)
MCV RBC AUTO: 87.8 FL — SIGNIFICANT CHANGE UP (ref 80–100)
MICROCYTES BLD QL: SLIGHT — SIGNIFICANT CHANGE UP
MONOCYTES # BLD AUTO: 0.95 K/UL — HIGH (ref 0–0.9)
MONOCYTES NFR BLD AUTO: 6.2 % — SIGNIFICANT CHANGE UP (ref 2–14)
NEUTROPHILS # BLD AUTO: 11.83 K/UL — HIGH (ref 1.8–7.4)
NEUTROPHILS NFR BLD AUTO: 75 % — SIGNIFICANT CHANGE UP (ref 43–77)
NEUTS BAND # BLD: 1.8 % — SIGNIFICANT CHANGE UP (ref 0–8)
OVALOCYTES BLD QL SMEAR: SLIGHT — SIGNIFICANT CHANGE UP
PLAT MORPH BLD: NORMAL — SIGNIFICANT CHANGE UP
PLATELET # BLD AUTO: 130 K/UL — LOW (ref 150–400)
POIKILOCYTOSIS BLD QL AUTO: SIGNIFICANT CHANGE UP
POLYCHROMASIA BLD QL SMEAR: SIGNIFICANT CHANGE UP
POTASSIUM SERPL-MCNC: 3.7 MMOL/L — SIGNIFICANT CHANGE UP (ref 3.5–5.3)
POTASSIUM SERPL-SCNC: 3.7 MMOL/L — SIGNIFICANT CHANGE UP (ref 3.5–5.3)
RBC # BLD: 4.67 M/UL — SIGNIFICANT CHANGE UP (ref 4.2–5.8)
RBC # FLD: 13.8 % — SIGNIFICANT CHANGE UP (ref 10.3–14.5)
RBC BLD AUTO: ABNORMAL
SODIUM SERPL-SCNC: 142 MMOL/L — SIGNIFICANT CHANGE UP (ref 135–145)
SPHEROCYTES BLD QL SMEAR: SLIGHT — SIGNIFICANT CHANGE UP
WBC # BLD: 15.4 K/UL — HIGH (ref 3.8–10.5)
WBC # FLD AUTO: 15.4 K/UL — HIGH (ref 3.8–10.5)

## 2024-06-06 PROCEDURE — 99232 SBSQ HOSP IP/OBS MODERATE 35: CPT

## 2024-06-06 RX ADMIN — OXYCODONE HYDROCHLORIDE 60 MILLIGRAM(S): 100 SOLUTION ORAL at 19:07

## 2024-06-06 RX ADMIN — Medication 1000 MILLIGRAM(S): at 22:11

## 2024-06-06 RX ADMIN — POLYETHYLENE GLYCOL 3350 17 GRAM(S): 1 POWDER ORAL at 06:07

## 2024-06-06 RX ADMIN — DIAZEPAM 5 MILLIGRAM(S): 10 TABLET ORAL at 06:06

## 2024-06-06 RX ADMIN — MESALAMINE 250 MILLIGRAM(S): 500 CAPSULE ORAL at 19:08

## 2024-06-06 RX ADMIN — Medication 1000 MILLIGRAM(S): at 14:41

## 2024-06-06 RX ADMIN — ASPIRIN 81 MILLIGRAM(S): 325 TABLET, FILM COATED ORAL at 12:32

## 2024-06-06 RX ADMIN — ENOXAPARIN SODIUM 40 MILLIGRAM(S): 100 INJECTION SUBCUTANEOUS at 19:10

## 2024-06-06 RX ADMIN — ENOXAPARIN SODIUM 40 MILLIGRAM(S): 100 INJECTION SUBCUTANEOUS at 06:08

## 2024-06-06 RX ADMIN — OXYCODONE HYDROCHLORIDE 60 MILLIGRAM(S): 100 SOLUTION ORAL at 03:45

## 2024-06-06 RX ADMIN — Medication 1000 MILLIGRAM(S): at 07:20

## 2024-06-06 RX ADMIN — OXYCODONE HYDROCHLORIDE 60 MILLIGRAM(S): 100 SOLUTION ORAL at 12:33

## 2024-06-06 RX ADMIN — Medication 10 MILLIGRAM(S): at 22:10

## 2024-06-06 RX ADMIN — ONDANSETRON HYDROCHLORIDE 4 MILLIGRAM(S): 2 INJECTION INTRAMUSCULAR; INTRAVENOUS at 06:07

## 2024-06-06 RX ADMIN — Medication 10 MILLIGRAM(S): at 06:07

## 2024-06-06 RX ADMIN — VALACYCLOVIR HYDROCHLORIDE 1000 MILLIGRAM(S): 500 TABLET, FILM COATED ORAL at 19:07

## 2024-06-06 RX ADMIN — Medication 10 MILLIGRAM(S): at 14:41

## 2024-06-06 RX ADMIN — INSULIN LISPRO 2: 100 INJECTION, SOLUTION SUBCUTANEOUS at 22:12

## 2024-06-06 RX ADMIN — POLYETHYLENE GLYCOL 3350 17 GRAM(S): 1 POWDER ORAL at 19:09

## 2024-06-06 RX ADMIN — Medication 800 MILLIGRAM(S): at 22:10

## 2024-06-06 RX ADMIN — MESALAMINE 250 MILLIGRAM(S): 500 CAPSULE ORAL at 06:06

## 2024-06-06 RX ADMIN — Medication 1000 MILLIGRAM(S): at 06:07

## 2024-06-06 RX ADMIN — OXYCODONE HYDROCHLORIDE 60 MILLIGRAM(S): 100 SOLUTION ORAL at 10:21

## 2024-06-06 RX ADMIN — Medication 50 MILLIGRAM(S): at 06:07

## 2024-06-06 RX ADMIN — OXYCODONE HYDROCHLORIDE 60 MILLIGRAM(S): 100 SOLUTION ORAL at 22:10

## 2024-06-06 RX ADMIN — RANOLAZINE 500 MILLIGRAM(S): 500 TABLET, EXTENDED RELEASE ORAL at 19:09

## 2024-06-06 RX ADMIN — OXYCODONE HYDROCHLORIDE 60 MILLIGRAM(S): 100 SOLUTION ORAL at 20:07

## 2024-06-06 RX ADMIN — LORATADINE 10 MILLIGRAM(S): 10 TABLET ORAL at 12:32

## 2024-06-06 RX ADMIN — ISOSORBIDE DINITRATE 30 MILLIGRAM(S): 5 TABLET ORAL at 09:22

## 2024-06-06 RX ADMIN — INSULIN LISPRO 2: 100 INJECTION, SOLUTION SUBCUTANEOUS at 07:49

## 2024-06-06 RX ADMIN — OXYCODONE HYDROCHLORIDE 60 MILLIGRAM(S): 100 SOLUTION ORAL at 09:21

## 2024-06-06 RX ADMIN — DIAZEPAM 5 MILLIGRAM(S): 10 TABLET ORAL at 14:41

## 2024-06-06 RX ADMIN — Medication 800 MILLIGRAM(S): at 06:07

## 2024-06-06 RX ADMIN — Medication 50 MILLIGRAM(S): at 17:21

## 2024-06-06 RX ADMIN — OXYCODONE HYDROCHLORIDE 60 MILLIGRAM(S): 100 SOLUTION ORAL at 23:10

## 2024-06-06 RX ADMIN — Medication 2 TABLET(S): at 22:11

## 2024-06-06 RX ADMIN — OXYCODONE HYDROCHLORIDE 60 MILLIGRAM(S): 100 SOLUTION ORAL at 00:16

## 2024-06-06 RX ADMIN — PANTOPRAZOLE SODIUM 40 MILLIGRAM(S): 40 INJECTION, POWDER, FOR SOLUTION INTRAVENOUS at 09:22

## 2024-06-06 RX ADMIN — VALACYCLOVIR HYDROCHLORIDE 1000 MILLIGRAM(S): 500 TABLET, FILM COATED ORAL at 12:32

## 2024-06-06 RX ADMIN — VALACYCLOVIR HYDROCHLORIDE 1000 MILLIGRAM(S): 500 TABLET, FILM COATED ORAL at 03:38

## 2024-06-06 RX ADMIN — ATORVASTATIN CALCIUM 40 MILLIGRAM(S): 20 TABLET, FILM COATED ORAL at 22:11

## 2024-06-06 RX ADMIN — OXYCODONE HYDROCHLORIDE 60 MILLIGRAM(S): 100 SOLUTION ORAL at 04:45

## 2024-06-06 RX ADMIN — Medication 5 MILLIGRAM(S): at 22:11

## 2024-06-06 RX ADMIN — Medication 800 MILLIGRAM(S): at 14:41

## 2024-06-06 RX ADMIN — DIAZEPAM 5 MILLIGRAM(S): 10 TABLET ORAL at 22:10

## 2024-06-06 RX ADMIN — MESALAMINE 250 MILLIGRAM(S): 500 CAPSULE ORAL at 12:32

## 2024-06-06 RX ADMIN — OXYCODONE HYDROCHLORIDE 60 MILLIGRAM(S): 100 SOLUTION ORAL at 13:33

## 2024-06-06 RX ADMIN — INSULIN GLARGINE 5 UNIT(S): 100 INJECTION, SOLUTION SUBCUTANEOUS at 22:11

## 2024-06-06 RX ADMIN — RANOLAZINE 500 MILLIGRAM(S): 500 TABLET, EXTENDED RELEASE ORAL at 06:06

## 2024-06-07 DIAGNOSIS — D69.6 THROMBOCYTOPENIA, UNSPECIFIED: ICD-10-CM

## 2024-06-07 LAB
ANION GAP SERPL CALC-SCNC: 14 MMOL/L — SIGNIFICANT CHANGE UP (ref 5–17)
BUN SERPL-MCNC: 16 MG/DL — SIGNIFICANT CHANGE UP (ref 7–23)
CALCIUM SERPL-MCNC: 8.6 MG/DL — SIGNIFICANT CHANGE UP (ref 8.4–10.5)
CHLORIDE SERPL-SCNC: 94 MMOL/L — LOW (ref 96–108)
CO2 SERPL-SCNC: 26 MMOL/L — SIGNIFICANT CHANGE UP (ref 22–31)
CREAT SERPL-MCNC: 0.59 MG/DL — SIGNIFICANT CHANGE UP (ref 0.5–1.3)
EGFR: 113 ML/MIN/1.73M2 — SIGNIFICANT CHANGE UP
GLUCOSE BLDC GLUCOMTR-MCNC: 138 MG/DL — HIGH (ref 70–99)
GLUCOSE BLDC GLUCOMTR-MCNC: 146 MG/DL — HIGH (ref 70–99)
GLUCOSE BLDC GLUCOMTR-MCNC: 167 MG/DL — HIGH (ref 70–99)
GLUCOSE BLDC GLUCOMTR-MCNC: 173 MG/DL — HIGH (ref 70–99)
GLUCOSE SERPL-MCNC: 154 MG/DL — HIGH (ref 70–99)
HCT VFR BLD CALC: 39.1 % — SIGNIFICANT CHANGE UP (ref 39–50)
HGB BLD-MCNC: 12.5 G/DL — LOW (ref 13–17)
MCHC RBC-ENTMCNC: 28.3 PG — SIGNIFICANT CHANGE UP (ref 27–34)
MCHC RBC-ENTMCNC: 32 GM/DL — SIGNIFICANT CHANGE UP (ref 32–36)
MCV RBC AUTO: 88.7 FL — SIGNIFICANT CHANGE UP (ref 80–100)
NRBC # BLD: 0 /100 WBCS — SIGNIFICANT CHANGE UP (ref 0–0)
PLATELET # BLD AUTO: 104 K/UL — LOW (ref 150–400)
POTASSIUM SERPL-MCNC: 4 MMOL/L — SIGNIFICANT CHANGE UP (ref 3.5–5.3)
POTASSIUM SERPL-SCNC: 4 MMOL/L — SIGNIFICANT CHANGE UP (ref 3.5–5.3)
RBC # BLD: 4.41 M/UL — SIGNIFICANT CHANGE UP (ref 4.2–5.8)
RBC # FLD: 14 % — SIGNIFICANT CHANGE UP (ref 10.3–14.5)
SODIUM SERPL-SCNC: 134 MMOL/L — LOW (ref 135–145)
WBC # BLD: 10.93 K/UL — HIGH (ref 3.8–10.5)
WBC # FLD AUTO: 10.93 K/UL — HIGH (ref 3.8–10.5)

## 2024-06-07 PROCEDURE — 99223 1ST HOSP IP/OBS HIGH 75: CPT

## 2024-06-07 PROCEDURE — 99233 SBSQ HOSP IP/OBS HIGH 50: CPT

## 2024-06-07 RX ORDER — ACYCLOVIR SODIUM 50 MG/ML
700 VIAL (ML) INTRAVENOUS EVERY 8 HOURS
Refills: 0 | Status: DISCONTINUED | OUTPATIENT
Start: 2024-06-07 | End: 2024-06-10

## 2024-06-07 RX ORDER — DEXTROSE MONOHYDRATE AND SODIUM CHLORIDE 5; .3 G/100ML; G/100ML
1000 INJECTION, SOLUTION INTRAVENOUS
Refills: 0 | Status: DISCONTINUED | OUTPATIENT
Start: 2024-06-07 | End: 2024-06-11

## 2024-06-07 RX ADMIN — DIAZEPAM 5 MILLIGRAM(S): 10 TABLET ORAL at 22:29

## 2024-06-07 RX ADMIN — POLYETHYLENE GLYCOL 3350 17 GRAM(S): 1 POWDER ORAL at 17:45

## 2024-06-07 RX ADMIN — Medication 114 MILLIGRAM(S): at 19:07

## 2024-06-07 RX ADMIN — RANOLAZINE 500 MILLIGRAM(S): 500 TABLET, EXTENDED RELEASE ORAL at 17:44

## 2024-06-07 RX ADMIN — INSULIN LISPRO 2: 100 INJECTION, SOLUTION SUBCUTANEOUS at 12:44

## 2024-06-07 RX ADMIN — OXYCODONE HYDROCHLORIDE 60 MILLIGRAM(S): 100 SOLUTION ORAL at 18:44

## 2024-06-07 RX ADMIN — DEXTROSE MONOHYDRATE AND SODIUM CHLORIDE 125 MILLILITER(S): 5; .3 INJECTION, SOLUTION INTRAVENOUS at 17:46

## 2024-06-07 RX ADMIN — Medication 800 MILLIGRAM(S): at 06:37

## 2024-06-07 RX ADMIN — ENOXAPARIN SODIUM 40 MILLIGRAM(S): 100 INJECTION SUBCUTANEOUS at 17:44

## 2024-06-07 RX ADMIN — MESALAMINE 250 MILLIGRAM(S): 500 CAPSULE ORAL at 17:44

## 2024-06-07 RX ADMIN — Medication 10 MILLIGRAM(S): at 13:41

## 2024-06-07 RX ADMIN — OXYCODONE HYDROCHLORIDE 60 MILLIGRAM(S): 100 SOLUTION ORAL at 11:43

## 2024-06-07 RX ADMIN — Medication 800 MILLIGRAM(S): at 13:41

## 2024-06-07 RX ADMIN — OXYCODONE HYDROCHLORIDE 60 MILLIGRAM(S): 100 SOLUTION ORAL at 22:30

## 2024-06-07 RX ADMIN — DIAZEPAM 5 MILLIGRAM(S): 10 TABLET ORAL at 13:42

## 2024-06-07 RX ADMIN — Medication 10 MILLIGRAM(S): at 22:30

## 2024-06-07 RX ADMIN — Medication 1000 MILLIGRAM(S): at 13:41

## 2024-06-07 RX ADMIN — INSULIN GLARGINE 5 UNIT(S): 100 INJECTION, SOLUTION SUBCUTANEOUS at 22:31

## 2024-06-07 RX ADMIN — Medication 2 TABLET(S): at 22:30

## 2024-06-07 RX ADMIN — ONDANSETRON HYDROCHLORIDE 4 MILLIGRAM(S): 2 INJECTION INTRAMUSCULAR; INTRAVENOUS at 06:37

## 2024-06-07 RX ADMIN — Medication 5 MILLIGRAM(S): at 22:29

## 2024-06-07 RX ADMIN — PANTOPRAZOLE SODIUM 40 MILLIGRAM(S): 40 INJECTION, POWDER, FOR SOLUTION INTRAVENOUS at 06:37

## 2024-06-07 RX ADMIN — ENOXAPARIN SODIUM 40 MILLIGRAM(S): 100 INJECTION SUBCUTANEOUS at 06:38

## 2024-06-07 RX ADMIN — OXYCODONE HYDROCHLORIDE 60 MILLIGRAM(S): 100 SOLUTION ORAL at 17:44

## 2024-06-07 RX ADMIN — OXYCODONE HYDROCHLORIDE 60 MILLIGRAM(S): 100 SOLUTION ORAL at 10:43

## 2024-06-07 RX ADMIN — INSULIN LISPRO 2: 100 INJECTION, SOLUTION SUBCUTANEOUS at 17:53

## 2024-06-07 RX ADMIN — ISOSORBIDE DINITRATE 30 MILLIGRAM(S): 5 TABLET ORAL at 09:21

## 2024-06-07 RX ADMIN — OXYCODONE HYDROCHLORIDE 60 MILLIGRAM(S): 100 SOLUTION ORAL at 06:36

## 2024-06-07 RX ADMIN — OXYCODONE HYDROCHLORIDE 60 MILLIGRAM(S): 100 SOLUTION ORAL at 07:36

## 2024-06-07 RX ADMIN — Medication 50 MILLIGRAM(S): at 16:45

## 2024-06-07 RX ADMIN — ATORVASTATIN CALCIUM 40 MILLIGRAM(S): 20 TABLET, FILM COATED ORAL at 22:29

## 2024-06-07 RX ADMIN — Medication 1000 MILLIGRAM(S): at 22:29

## 2024-06-07 RX ADMIN — Medication 10 MILLIGRAM(S): at 06:37

## 2024-06-07 RX ADMIN — MESALAMINE 250 MILLIGRAM(S): 500 CAPSULE ORAL at 00:02

## 2024-06-07 RX ADMIN — RANOLAZINE 500 MILLIGRAM(S): 500 TABLET, EXTENDED RELEASE ORAL at 06:38

## 2024-06-07 RX ADMIN — MESALAMINE 250 MILLIGRAM(S): 500 CAPSULE ORAL at 12:42

## 2024-06-07 RX ADMIN — Medication 800 MILLIGRAM(S): at 22:30

## 2024-06-07 RX ADMIN — OXYCODONE HYDROCHLORIDE 60 MILLIGRAM(S): 100 SOLUTION ORAL at 23:30

## 2024-06-07 RX ADMIN — POLYETHYLENE GLYCOL 3350 17 GRAM(S): 1 POWDER ORAL at 06:38

## 2024-06-07 RX ADMIN — Medication 50 MILLIGRAM(S): at 06:38

## 2024-06-07 RX ADMIN — VALACYCLOVIR HYDROCHLORIDE 1000 MILLIGRAM(S): 500 TABLET, FILM COATED ORAL at 12:42

## 2024-06-07 RX ADMIN — LORATADINE 10 MILLIGRAM(S): 10 TABLET ORAL at 12:42

## 2024-06-07 RX ADMIN — Medication 1000 MILLIGRAM(S): at 06:37

## 2024-06-07 RX ADMIN — ASPIRIN 81 MILLIGRAM(S): 325 TABLET, FILM COATED ORAL at 12:42

## 2024-06-07 RX ADMIN — DIAZEPAM 5 MILLIGRAM(S): 10 TABLET ORAL at 06:37

## 2024-06-07 RX ADMIN — VALACYCLOVIR HYDROCHLORIDE 1000 MILLIGRAM(S): 500 TABLET, FILM COATED ORAL at 04:58

## 2024-06-07 RX ADMIN — MESALAMINE 250 MILLIGRAM(S): 500 CAPSULE ORAL at 06:37

## 2024-06-08 LAB
ADD ON TEST-SPECIMEN IN LAB: SIGNIFICANT CHANGE UP
ALBUMIN SERPL ELPH-MCNC: 3.3 G/DL — SIGNIFICANT CHANGE UP (ref 3.3–5)
ALP SERPL-CCNC: 83 U/L — SIGNIFICANT CHANGE UP (ref 40–120)
ALT FLD-CCNC: 34 U/L — SIGNIFICANT CHANGE UP (ref 10–45)
ANION GAP SERPL CALC-SCNC: 7 MMOL/L — SIGNIFICANT CHANGE UP (ref 5–17)
AST SERPL-CCNC: 19 U/L — SIGNIFICANT CHANGE UP (ref 10–40)
BASOPHILS # BLD AUTO: 0 K/UL — SIGNIFICANT CHANGE UP (ref 0–0.2)
BASOPHILS NFR BLD AUTO: 0 % — SIGNIFICANT CHANGE UP (ref 0–2)
BILIRUB DIRECT SERPL-MCNC: <0.2 MG/DL — SIGNIFICANT CHANGE UP (ref 0–0.3)
BILIRUB INDIRECT FLD-MCNC: SIGNIFICANT CHANGE UP MG/DL (ref 0.2–1)
BILIRUB SERPL-MCNC: 0.4 MG/DL — SIGNIFICANT CHANGE UP (ref 0.2–1.2)
BUN SERPL-MCNC: 13 MG/DL — SIGNIFICANT CHANGE UP (ref 7–23)
CALCIUM SERPL-MCNC: 8.8 MG/DL — SIGNIFICANT CHANGE UP (ref 8.4–10.5)
CHLORIDE SERPL-SCNC: 100 MMOL/L — SIGNIFICANT CHANGE UP (ref 96–108)
CO2 SERPL-SCNC: 30 MMOL/L — SIGNIFICANT CHANGE UP (ref 22–31)
CREAT SERPL-MCNC: 0.61 MG/DL — SIGNIFICANT CHANGE UP (ref 0.5–1.3)
EGFR: 112 ML/MIN/1.73M2 — SIGNIFICANT CHANGE UP
EOSINOPHIL # BLD AUTO: 0.14 K/UL — SIGNIFICANT CHANGE UP (ref 0–0.5)
EOSINOPHIL NFR BLD AUTO: 1.6 % — SIGNIFICANT CHANGE UP (ref 0–6)
GLUCOSE BLDC GLUCOMTR-MCNC: 130 MG/DL — HIGH (ref 70–99)
GLUCOSE BLDC GLUCOMTR-MCNC: 137 MG/DL — HIGH (ref 70–99)
GLUCOSE BLDC GLUCOMTR-MCNC: 140 MG/DL — HIGH (ref 70–99)
GLUCOSE BLDC GLUCOMTR-MCNC: 178 MG/DL — HIGH (ref 70–99)
GLUCOSE SERPL-MCNC: 153 MG/DL — HIGH (ref 70–99)
HCT VFR BLD CALC: 36.5 % — LOW (ref 39–50)
HGB BLD-MCNC: 12.1 G/DL — LOW (ref 13–17)
HIV 1+2 AB+HIV1 P24 AG SERPL QL IA: SIGNIFICANT CHANGE UP
IMM GRANULOCYTES NFR BLD AUTO: 1 % — HIGH (ref 0–0.9)
LYMPHOCYTES # BLD AUTO: 1.44 K/UL — SIGNIFICANT CHANGE UP (ref 1–3.3)
LYMPHOCYTES # BLD AUTO: 16.5 % — SIGNIFICANT CHANGE UP (ref 13–44)
MCHC RBC-ENTMCNC: 28.9 PG — SIGNIFICANT CHANGE UP (ref 27–34)
MCHC RBC-ENTMCNC: 33.2 GM/DL — SIGNIFICANT CHANGE UP (ref 32–36)
MCV RBC AUTO: 87.3 FL — SIGNIFICANT CHANGE UP (ref 80–100)
MONOCYTES # BLD AUTO: 0.86 K/UL — SIGNIFICANT CHANGE UP (ref 0–0.9)
MONOCYTES NFR BLD AUTO: 9.9 % — SIGNIFICANT CHANGE UP (ref 2–14)
NEUTROPHILS # BLD AUTO: 6.2 K/UL — SIGNIFICANT CHANGE UP (ref 1.8–7.4)
NEUTROPHILS NFR BLD AUTO: 71 % — SIGNIFICANT CHANGE UP (ref 43–77)
NRBC # BLD: 0 /100 WBCS — SIGNIFICANT CHANGE UP (ref 0–0)
PLATELET # BLD AUTO: 102 K/UL — LOW (ref 150–400)
POTASSIUM SERPL-MCNC: 3.8 MMOL/L — SIGNIFICANT CHANGE UP (ref 3.5–5.3)
POTASSIUM SERPL-SCNC: 3.8 MMOL/L — SIGNIFICANT CHANGE UP (ref 3.5–5.3)
PROT SERPL-MCNC: 6 G/DL — SIGNIFICANT CHANGE UP (ref 6–8.3)
RBC # BLD: 4.18 M/UL — LOW (ref 4.2–5.8)
RBC # FLD: 13.9 % — SIGNIFICANT CHANGE UP (ref 10.3–14.5)
SODIUM SERPL-SCNC: 137 MMOL/L — SIGNIFICANT CHANGE UP (ref 135–145)
WBC # BLD: 8.73 K/UL — SIGNIFICANT CHANGE UP (ref 3.8–10.5)
WBC # FLD AUTO: 8.73 K/UL — SIGNIFICANT CHANGE UP (ref 3.8–10.5)

## 2024-06-08 PROCEDURE — 99232 SBSQ HOSP IP/OBS MODERATE 35: CPT

## 2024-06-08 PROCEDURE — 99233 SBSQ HOSP IP/OBS HIGH 50: CPT

## 2024-06-08 RX ORDER — OXYCODONE HYDROCHLORIDE 100 MG/5ML
60 SOLUTION ORAL
Refills: 0 | Status: DISCONTINUED | OUTPATIENT
Start: 2024-06-08 | End: 2024-06-11

## 2024-06-08 RX ADMIN — Medication 800 MILLIGRAM(S): at 06:58

## 2024-06-08 RX ADMIN — INSULIN GLARGINE 5 UNIT(S): 100 INJECTION, SOLUTION SUBCUTANEOUS at 22:55

## 2024-06-08 RX ADMIN — Medication 1000 MILLIGRAM(S): at 06:59

## 2024-06-08 RX ADMIN — ENOXAPARIN SODIUM 40 MILLIGRAM(S): 100 INJECTION SUBCUTANEOUS at 06:58

## 2024-06-08 RX ADMIN — Medication 10 MILLIGRAM(S): at 15:14

## 2024-06-08 RX ADMIN — OXYCODONE HYDROCHLORIDE 60 MILLIGRAM(S): 100 SOLUTION ORAL at 11:36

## 2024-06-08 RX ADMIN — Medication 50 MILLIGRAM(S): at 07:20

## 2024-06-08 RX ADMIN — INSULIN LISPRO 2: 100 INJECTION, SOLUTION SUBCUTANEOUS at 23:14

## 2024-06-08 RX ADMIN — DIAZEPAM 5 MILLIGRAM(S): 10 TABLET ORAL at 15:14

## 2024-06-08 RX ADMIN — Medication 114 MILLIGRAM(S): at 12:20

## 2024-06-08 RX ADMIN — ONDANSETRON HYDROCHLORIDE 4 MILLIGRAM(S): 2 INJECTION INTRAMUSCULAR; INTRAVENOUS at 06:59

## 2024-06-08 RX ADMIN — OXYCODONE HYDROCHLORIDE 60 MILLIGRAM(S): 100 SOLUTION ORAL at 08:00

## 2024-06-08 RX ADMIN — RANOLAZINE 500 MILLIGRAM(S): 500 TABLET, EXTENDED RELEASE ORAL at 18:33

## 2024-06-08 RX ADMIN — Medication 50 MILLIGRAM(S): at 18:33

## 2024-06-08 RX ADMIN — ONDANSETRON HYDROCHLORIDE 4 MILLIGRAM(S): 2 INJECTION INTRAMUSCULAR; INTRAVENOUS at 00:22

## 2024-06-08 RX ADMIN — Medication 1000 MILLIGRAM(S): at 01:08

## 2024-06-08 RX ADMIN — ASPIRIN 81 MILLIGRAM(S): 325 TABLET, FILM COATED ORAL at 11:37

## 2024-06-08 RX ADMIN — DIAZEPAM 5 MILLIGRAM(S): 10 TABLET ORAL at 22:54

## 2024-06-08 RX ADMIN — Medication 114 MILLIGRAM(S): at 20:21

## 2024-06-08 RX ADMIN — Medication 1000 MILLIGRAM(S): at 07:15

## 2024-06-08 RX ADMIN — DEXTROSE MONOHYDRATE AND SODIUM CHLORIDE 125 MILLILITER(S): 5; .3 INJECTION, SOLUTION INTRAVENOUS at 12:19

## 2024-06-08 RX ADMIN — MESALAMINE 250 MILLIGRAM(S): 500 CAPSULE ORAL at 18:34

## 2024-06-08 RX ADMIN — Medication 800 MILLIGRAM(S): at 22:53

## 2024-06-08 RX ADMIN — POLYETHYLENE GLYCOL 3350 17 GRAM(S): 1 POWDER ORAL at 07:00

## 2024-06-08 RX ADMIN — OXYCODONE HYDROCHLORIDE 60 MILLIGRAM(S): 100 SOLUTION ORAL at 07:00

## 2024-06-08 RX ADMIN — MESALAMINE 250 MILLIGRAM(S): 500 CAPSULE ORAL at 06:59

## 2024-06-08 RX ADMIN — OXYCODONE HYDROCHLORIDE 60 MILLIGRAM(S): 100 SOLUTION ORAL at 23:13

## 2024-06-08 RX ADMIN — LORATADINE 10 MILLIGRAM(S): 10 TABLET ORAL at 12:50

## 2024-06-08 RX ADMIN — MESALAMINE 250 MILLIGRAM(S): 500 CAPSULE ORAL at 11:37

## 2024-06-08 RX ADMIN — Medication 1000 MILLIGRAM(S): at 22:54

## 2024-06-08 RX ADMIN — POLYETHYLENE GLYCOL 3350 17 GRAM(S): 1 POWDER ORAL at 18:33

## 2024-06-08 RX ADMIN — ENOXAPARIN SODIUM 40 MILLIGRAM(S): 100 INJECTION SUBCUTANEOUS at 18:32

## 2024-06-08 RX ADMIN — Medication 1000 MILLIGRAM(S): at 15:14

## 2024-06-08 RX ADMIN — Medication 2 TABLET(S): at 22:54

## 2024-06-08 RX ADMIN — PANTOPRAZOLE SODIUM 40 MILLIGRAM(S): 40 INJECTION, POWDER, FOR SOLUTION INTRAVENOUS at 06:59

## 2024-06-08 RX ADMIN — OXYCODONE HYDROCHLORIDE 60 MILLIGRAM(S): 100 SOLUTION ORAL at 12:08

## 2024-06-08 RX ADMIN — Medication 10 MILLIGRAM(S): at 06:59

## 2024-06-08 RX ADMIN — Medication 1000 MILLIGRAM(S): at 23:54

## 2024-06-08 RX ADMIN — DIAZEPAM 5 MILLIGRAM(S): 10 TABLET ORAL at 06:59

## 2024-06-08 RX ADMIN — Medication 1000 MILLIGRAM(S): at 15:50

## 2024-06-08 RX ADMIN — RANOLAZINE 500 MILLIGRAM(S): 500 TABLET, EXTENDED RELEASE ORAL at 06:59

## 2024-06-08 RX ADMIN — Medication 5 MILLIGRAM(S): at 22:54

## 2024-06-08 RX ADMIN — ONDANSETRON HYDROCHLORIDE 4 MILLIGRAM(S): 2 INJECTION INTRAMUSCULAR; INTRAVENOUS at 18:33

## 2024-06-08 RX ADMIN — ATORVASTATIN CALCIUM 40 MILLIGRAM(S): 20 TABLET, FILM COATED ORAL at 22:54

## 2024-06-08 RX ADMIN — ONDANSETRON HYDROCHLORIDE 4 MILLIGRAM(S): 2 INJECTION INTRAMUSCULAR; INTRAVENOUS at 12:50

## 2024-06-08 RX ADMIN — Medication 800 MILLIGRAM(S): at 15:14

## 2024-06-08 RX ADMIN — Medication 114 MILLIGRAM(S): at 02:55

## 2024-06-08 RX ADMIN — Medication 10 MILLIGRAM(S): at 22:54

## 2024-06-08 RX ADMIN — MESALAMINE 250 MILLIGRAM(S): 500 CAPSULE ORAL at 00:22

## 2024-06-09 LAB
ANION GAP SERPL CALC-SCNC: 9 MMOL/L — SIGNIFICANT CHANGE UP (ref 5–17)
BUN SERPL-MCNC: 10 MG/DL — SIGNIFICANT CHANGE UP (ref 7–23)
CALCIUM SERPL-MCNC: 8.7 MG/DL — SIGNIFICANT CHANGE UP (ref 8.4–10.5)
CHLORIDE SERPL-SCNC: 100 MMOL/L — SIGNIFICANT CHANGE UP (ref 96–108)
CO2 SERPL-SCNC: 27 MMOL/L — SIGNIFICANT CHANGE UP (ref 22–31)
CREAT SERPL-MCNC: 0.54 MG/DL — SIGNIFICANT CHANGE UP (ref 0.5–1.3)
EGFR: 116 ML/MIN/1.73M2 — SIGNIFICANT CHANGE UP
GLUCOSE BLDC GLUCOMTR-MCNC: 135 MG/DL — HIGH (ref 70–99)
GLUCOSE BLDC GLUCOMTR-MCNC: 137 MG/DL — HIGH (ref 70–99)
GLUCOSE BLDC GLUCOMTR-MCNC: 139 MG/DL — HIGH (ref 70–99)
GLUCOSE BLDC GLUCOMTR-MCNC: 173 MG/DL — HIGH (ref 70–99)
GLUCOSE SERPL-MCNC: 155 MG/DL — HIGH (ref 70–99)
HCT VFR BLD CALC: 39.4 % — SIGNIFICANT CHANGE UP (ref 39–50)
HGB BLD-MCNC: 12 G/DL — LOW (ref 13–17)
HSV+VZV DNA SPEC QL NAA+PROBE: ABNORMAL
MCHC RBC-ENTMCNC: 28.4 PG — SIGNIFICANT CHANGE UP (ref 27–34)
MCHC RBC-ENTMCNC: 30.5 GM/DL — LOW (ref 32–36)
MCV RBC AUTO: 93.4 FL — SIGNIFICANT CHANGE UP (ref 80–100)
NRBC # BLD: 0 /100 WBCS — SIGNIFICANT CHANGE UP (ref 0–0)
PLATELET # BLD AUTO: 86 K/UL — LOW (ref 150–400)
POTASSIUM SERPL-MCNC: SIGNIFICANT CHANGE UP MMOL/L (ref 3.5–5.3)
POTASSIUM SERPL-SCNC: SIGNIFICANT CHANGE UP MMOL/L (ref 3.5–5.3)
RBC # BLD: 4.22 M/UL — SIGNIFICANT CHANGE UP (ref 4.2–5.8)
RBC # FLD: 13.8 % — SIGNIFICANT CHANGE UP (ref 10.3–14.5)
SODIUM SERPL-SCNC: 136 MMOL/L — SIGNIFICANT CHANGE UP (ref 135–145)
SPECIMEN SOURCE: SIGNIFICANT CHANGE UP
WBC # BLD: 7.94 K/UL — SIGNIFICANT CHANGE UP (ref 3.8–10.5)
WBC # FLD AUTO: 7.94 K/UL — SIGNIFICANT CHANGE UP (ref 3.8–10.5)

## 2024-06-09 PROCEDURE — 71045 X-RAY EXAM CHEST 1 VIEW: CPT | Mod: 26

## 2024-06-09 PROCEDURE — 99232 SBSQ HOSP IP/OBS MODERATE 35: CPT

## 2024-06-09 PROCEDURE — 73560 X-RAY EXAM OF KNEE 1 OR 2: CPT | Mod: 26,50

## 2024-06-09 RX ADMIN — ENOXAPARIN SODIUM 40 MILLIGRAM(S): 100 INJECTION SUBCUTANEOUS at 18:16

## 2024-06-09 RX ADMIN — OXYCODONE HYDROCHLORIDE 60 MILLIGRAM(S): 100 SOLUTION ORAL at 00:13

## 2024-06-09 RX ADMIN — Medication 800 MILLIGRAM(S): at 22:53

## 2024-06-09 RX ADMIN — Medication 1000 MILLIGRAM(S): at 06:02

## 2024-06-09 RX ADMIN — DIAZEPAM 5 MILLIGRAM(S): 10 TABLET ORAL at 22:55

## 2024-06-09 RX ADMIN — Medication 114 MILLIGRAM(S): at 18:16

## 2024-06-09 RX ADMIN — ATORVASTATIN CALCIUM 40 MILLIGRAM(S): 20 TABLET, FILM COATED ORAL at 22:54

## 2024-06-09 RX ADMIN — Medication 2 TABLET(S): at 22:54

## 2024-06-09 RX ADMIN — ONDANSETRON HYDROCHLORIDE 4 MILLIGRAM(S): 2 INJECTION INTRAMUSCULAR; INTRAVENOUS at 12:35

## 2024-06-09 RX ADMIN — Medication 10 MILLIGRAM(S): at 13:36

## 2024-06-09 RX ADMIN — ONDANSETRON HYDROCHLORIDE 4 MILLIGRAM(S): 2 INJECTION INTRAMUSCULAR; INTRAVENOUS at 18:16

## 2024-06-09 RX ADMIN — Medication 50 MILLIGRAM(S): at 18:16

## 2024-06-09 RX ADMIN — DIAZEPAM 5 MILLIGRAM(S): 10 TABLET ORAL at 13:36

## 2024-06-09 RX ADMIN — POLYETHYLENE GLYCOL 3350 17 GRAM(S): 1 POWDER ORAL at 18:15

## 2024-06-09 RX ADMIN — Medication 114 MILLIGRAM(S): at 10:06

## 2024-06-09 RX ADMIN — Medication 800 MILLIGRAM(S): at 13:36

## 2024-06-09 RX ADMIN — Medication 1000 MILLIGRAM(S): at 23:05

## 2024-06-09 RX ADMIN — INSULIN LISPRO 2: 100 INJECTION, SOLUTION SUBCUTANEOUS at 19:00

## 2024-06-09 RX ADMIN — Medication 1000 MILLIGRAM(S): at 07:02

## 2024-06-09 RX ADMIN — Medication 10 MILLIGRAM(S): at 06:02

## 2024-06-09 RX ADMIN — Medication 1000 MILLIGRAM(S): at 12:05

## 2024-06-09 RX ADMIN — ISOSORBIDE DINITRATE 30 MILLIGRAM(S): 5 TABLET ORAL at 10:06

## 2024-06-09 RX ADMIN — Medication 114 MILLIGRAM(S): at 01:14

## 2024-06-09 RX ADMIN — OXYCODONE HYDROCHLORIDE 60 MILLIGRAM(S): 100 SOLUTION ORAL at 19:56

## 2024-06-09 RX ADMIN — RANOLAZINE 500 MILLIGRAM(S): 500 TABLET, EXTENDED RELEASE ORAL at 18:16

## 2024-06-09 RX ADMIN — DIAZEPAM 5 MILLIGRAM(S): 10 TABLET ORAL at 06:03

## 2024-06-09 RX ADMIN — Medication 800 MILLIGRAM(S): at 06:03

## 2024-06-09 RX ADMIN — PANTOPRAZOLE SODIUM 40 MILLIGRAM(S): 40 INJECTION, POWDER, FOR SOLUTION INTRAVENOUS at 06:16

## 2024-06-09 RX ADMIN — ONDANSETRON HYDROCHLORIDE 4 MILLIGRAM(S): 2 INJECTION INTRAMUSCULAR; INTRAVENOUS at 06:02

## 2024-06-09 RX ADMIN — MESALAMINE 250 MILLIGRAM(S): 500 CAPSULE ORAL at 01:14

## 2024-06-09 RX ADMIN — INSULIN GLARGINE 5 UNIT(S): 100 INJECTION, SOLUTION SUBCUTANEOUS at 22:54

## 2024-06-09 RX ADMIN — Medication 10 MILLIGRAM(S): at 22:54

## 2024-06-09 RX ADMIN — OXYCODONE HYDROCHLORIDE 60 MILLIGRAM(S): 100 SOLUTION ORAL at 11:08

## 2024-06-09 RX ADMIN — OXYCODONE HYDROCHLORIDE 60 MILLIGRAM(S): 100 SOLUTION ORAL at 10:08

## 2024-06-09 RX ADMIN — MESALAMINE 250 MILLIGRAM(S): 500 CAPSULE ORAL at 12:35

## 2024-06-09 RX ADMIN — DEXTROSE MONOHYDRATE AND SODIUM CHLORIDE 125 MILLILITER(S): 5; .3 INJECTION, SOLUTION INTRAVENOUS at 18:15

## 2024-06-09 RX ADMIN — Medication 1000 MILLIGRAM(S): at 13:36

## 2024-06-09 RX ADMIN — MESALAMINE 250 MILLIGRAM(S): 500 CAPSULE ORAL at 06:02

## 2024-06-09 RX ADMIN — ENOXAPARIN SODIUM 40 MILLIGRAM(S): 100 INJECTION SUBCUTANEOUS at 06:03

## 2024-06-09 RX ADMIN — OXYCODONE HYDROCHLORIDE 60 MILLIGRAM(S): 100 SOLUTION ORAL at 06:16

## 2024-06-09 RX ADMIN — POLYETHYLENE GLYCOL 3350 17 GRAM(S): 1 POWDER ORAL at 06:02

## 2024-06-09 RX ADMIN — Medication 50 MILLIGRAM(S): at 06:02

## 2024-06-09 RX ADMIN — OXYCODONE HYDROCHLORIDE 60 MILLIGRAM(S): 100 SOLUTION ORAL at 22:54

## 2024-06-09 RX ADMIN — ASPIRIN 81 MILLIGRAM(S): 325 TABLET, FILM COATED ORAL at 12:35

## 2024-06-09 RX ADMIN — LORATADINE 10 MILLIGRAM(S): 10 TABLET ORAL at 12:34

## 2024-06-09 RX ADMIN — OXYCODONE HYDROCHLORIDE 60 MILLIGRAM(S): 100 SOLUTION ORAL at 07:16

## 2024-06-09 RX ADMIN — Medication 5 MILLIGRAM(S): at 22:53

## 2024-06-09 RX ADMIN — OXYCODONE HYDROCHLORIDE 60 MILLIGRAM(S): 100 SOLUTION ORAL at 18:56

## 2024-06-09 RX ADMIN — MESALAMINE 250 MILLIGRAM(S): 500 CAPSULE ORAL at 18:16

## 2024-06-09 RX ADMIN — ONDANSETRON HYDROCHLORIDE 4 MILLIGRAM(S): 2 INJECTION INTRAMUSCULAR; INTRAVENOUS at 01:14

## 2024-06-09 RX ADMIN — RANOLAZINE 500 MILLIGRAM(S): 500 TABLET, EXTENDED RELEASE ORAL at 06:02

## 2024-06-09 RX ADMIN — OXYCODONE HYDROCHLORIDE 60 MILLIGRAM(S): 100 SOLUTION ORAL at 23:54

## 2024-06-10 DIAGNOSIS — B00.7 DISSEMINATED HERPESVIRAL DISEASE: ICD-10-CM
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
## 2024-06-10 LAB
ADD ON TEST-SPECIMEN IN LAB: SIGNIFICANT CHANGE UP
ALBUMIN SERPL ELPH-MCNC: 3.2 G/DL — LOW (ref 3.3–5)
ALP SERPL-CCNC: 89 U/L — SIGNIFICANT CHANGE UP (ref 40–120)
ALT FLD-CCNC: 30 U/L — SIGNIFICANT CHANGE UP (ref 10–45)
ANION GAP SERPL CALC-SCNC: 4 MMOL/L — LOW (ref 5–17)
AST SERPL-CCNC: 17 U/L — SIGNIFICANT CHANGE UP (ref 10–40)
BASOPHILS # BLD AUTO: 0.01 K/UL — SIGNIFICANT CHANGE UP (ref 0–0.2)
BASOPHILS NFR BLD AUTO: 0.1 % — SIGNIFICANT CHANGE UP (ref 0–2)
BILIRUB DIRECT SERPL-MCNC: <0.2 MG/DL — SIGNIFICANT CHANGE UP (ref 0–0.3)
BILIRUB INDIRECT FLD-MCNC: SIGNIFICANT CHANGE UP (ref 0.2–1)
BILIRUB SERPL-MCNC: 0.4 MG/DL — SIGNIFICANT CHANGE UP (ref 0.2–1.2)
BUN SERPL-MCNC: 8 MG/DL — SIGNIFICANT CHANGE UP (ref 7–23)
CALCIUM SERPL-MCNC: 8.8 MG/DL — SIGNIFICANT CHANGE UP (ref 8.4–10.5)
CHLORIDE SERPL-SCNC: 104 MMOL/L — SIGNIFICANT CHANGE UP (ref 96–108)
CO2 SERPL-SCNC: 32 MMOL/L — HIGH (ref 22–31)
CREAT SERPL-MCNC: 0.56 MG/DL — SIGNIFICANT CHANGE UP (ref 0.5–1.3)
EGFR: 115 ML/MIN/1.73M2 — SIGNIFICANT CHANGE UP
EOSINOPHIL # BLD AUTO: 0.13 K/UL — SIGNIFICANT CHANGE UP (ref 0–0.5)
EOSINOPHIL NFR BLD AUTO: 1.7 % — SIGNIFICANT CHANGE UP (ref 0–6)
GLUCOSE BLDC GLUCOMTR-MCNC: 118 MG/DL — HIGH (ref 70–99)
GLUCOSE BLDC GLUCOMTR-MCNC: 139 MG/DL — HIGH (ref 70–99)
GLUCOSE BLDC GLUCOMTR-MCNC: 175 MG/DL — HIGH (ref 70–99)
GLUCOSE BLDC GLUCOMTR-MCNC: 193 MG/DL — HIGH (ref 70–99)
GLUCOSE SERPL-MCNC: 132 MG/DL — HIGH (ref 70–99)
HCT VFR BLD CALC: 39.1 % — SIGNIFICANT CHANGE UP (ref 39–50)
HGB BLD-MCNC: 12.2 G/DL — LOW (ref 13–17)
IMM GRANULOCYTES NFR BLD AUTO: 0.8 % — SIGNIFICANT CHANGE UP (ref 0–0.9)
LYMPHOCYTES # BLD AUTO: 1.68 K/UL — SIGNIFICANT CHANGE UP (ref 1–3.3)
LYMPHOCYTES # BLD AUTO: 22.4 % — SIGNIFICANT CHANGE UP (ref 13–44)
MCHC RBC-ENTMCNC: 28.7 PG — SIGNIFICANT CHANGE UP (ref 27–34)
MCHC RBC-ENTMCNC: 31.2 GM/DL — LOW (ref 32–36)
MCV RBC AUTO: 92 FL — SIGNIFICANT CHANGE UP (ref 80–100)
MONOCYTES # BLD AUTO: 0.9 K/UL — SIGNIFICANT CHANGE UP (ref 0–0.9)
MONOCYTES NFR BLD AUTO: 12 % — SIGNIFICANT CHANGE UP (ref 2–14)
NEUTROPHILS # BLD AUTO: 4.73 K/UL — SIGNIFICANT CHANGE UP (ref 1.8–7.4)
NEUTROPHILS NFR BLD AUTO: 63 % — SIGNIFICANT CHANGE UP (ref 43–77)
NRBC # BLD: 0 /100 WBCS — SIGNIFICANT CHANGE UP (ref 0–0)
PLATELET # BLD AUTO: 78 K/UL — LOW (ref 150–400)
POTASSIUM SERPL-MCNC: 4.2 MMOL/L — SIGNIFICANT CHANGE UP (ref 3.5–5.3)
POTASSIUM SERPL-SCNC: 4.2 MMOL/L — SIGNIFICANT CHANGE UP (ref 3.5–5.3)
PROT SERPL-MCNC: 5.6 G/DL — LOW (ref 6–8.3)
RBC # BLD: 4.25 M/UL — SIGNIFICANT CHANGE UP (ref 4.2–5.8)
RBC # FLD: 14 % — SIGNIFICANT CHANGE UP (ref 10.3–14.5)
SODIUM SERPL-SCNC: 140 MMOL/L — SIGNIFICANT CHANGE UP (ref 135–145)
WBC # BLD: 7.37 K/UL — SIGNIFICANT CHANGE UP (ref 3.8–10.5)
WBC # FLD AUTO: 7.37 K/UL — SIGNIFICANT CHANGE UP (ref 3.8–10.5)

## 2024-06-10 PROCEDURE — 99233 SBSQ HOSP IP/OBS HIGH 50: CPT

## 2024-06-10 RX ORDER — FOSCARNET SODIUM 24 MG/ML
5000 INJECTION, SOLUTION INTRAVENOUS EVERY 8 HOURS
Refills: 0 | Status: DISCONTINUED | OUTPATIENT
Start: 2024-06-10 | End: 2024-06-10

## 2024-06-10 RX ORDER — FOSCARNET SODIUM 24 MG/ML
5000 INJECTION, SOLUTION INTRAVENOUS EVERY 8 HOURS
Refills: 0 | Status: DISCONTINUED | OUTPATIENT
Start: 2024-06-10 | End: 2024-06-19

## 2024-06-10 RX ADMIN — ISOSORBIDE DINITRATE 30 MILLIGRAM(S): 5 TABLET ORAL at 10:05

## 2024-06-10 RX ADMIN — Medication 1000 MILLIGRAM(S): at 21:05

## 2024-06-10 RX ADMIN — FOSCARNET SODIUM 500 MILLIGRAM(S): 24 INJECTION, SOLUTION INTRAVENOUS at 23:29

## 2024-06-10 RX ADMIN — Medication 50 MILLIGRAM(S): at 17:46

## 2024-06-10 RX ADMIN — Medication 2 TABLET(S): at 21:53

## 2024-06-10 RX ADMIN — Medication 5 MILLIGRAM(S): at 22:50

## 2024-06-10 RX ADMIN — Medication 10 MILLIGRAM(S): at 06:33

## 2024-06-10 RX ADMIN — FOSCARNET SODIUM 500 MILLIGRAM(S): 24 INJECTION, SOLUTION INTRAVENOUS at 16:45

## 2024-06-10 RX ADMIN — ONDANSETRON HYDROCHLORIDE 4 MILLIGRAM(S): 2 INJECTION INTRAMUSCULAR; INTRAVENOUS at 06:34

## 2024-06-10 RX ADMIN — DIAZEPAM 5 MILLIGRAM(S): 10 TABLET ORAL at 22:50

## 2024-06-10 RX ADMIN — ONDANSETRON HYDROCHLORIDE 4 MILLIGRAM(S): 2 INJECTION INTRAMUSCULAR; INTRAVENOUS at 00:43

## 2024-06-10 RX ADMIN — POLYETHYLENE GLYCOL 3350 17 GRAM(S): 1 POWDER ORAL at 17:45

## 2024-06-10 RX ADMIN — INSULIN LISPRO 2: 100 INJECTION, SOLUTION SUBCUTANEOUS at 22:50

## 2024-06-10 RX ADMIN — POLYETHYLENE GLYCOL 3350 17 GRAM(S): 1 POWDER ORAL at 06:34

## 2024-06-10 RX ADMIN — Medication 10 MILLIGRAM(S): at 14:35

## 2024-06-10 RX ADMIN — ENOXAPARIN SODIUM 40 MILLIGRAM(S): 100 INJECTION SUBCUTANEOUS at 17:46

## 2024-06-10 RX ADMIN — LORATADINE 10 MILLIGRAM(S): 10 TABLET ORAL at 12:11

## 2024-06-10 RX ADMIN — DIAZEPAM 5 MILLIGRAM(S): 10 TABLET ORAL at 06:33

## 2024-06-10 RX ADMIN — Medication 1000 MILLIGRAM(S): at 14:34

## 2024-06-10 RX ADMIN — ATORVASTATIN CALCIUM 40 MILLIGRAM(S): 20 TABLET, FILM COATED ORAL at 22:48

## 2024-06-10 RX ADMIN — Medication 10 MILLIGRAM(S): at 22:50

## 2024-06-10 RX ADMIN — RANOLAZINE 500 MILLIGRAM(S): 500 TABLET, EXTENDED RELEASE ORAL at 17:45

## 2024-06-10 RX ADMIN — ONDANSETRON HYDROCHLORIDE 4 MILLIGRAM(S): 2 INJECTION INTRAMUSCULAR; INTRAVENOUS at 12:11

## 2024-06-10 RX ADMIN — DIAZEPAM 5 MILLIGRAM(S): 10 TABLET ORAL at 14:34

## 2024-06-10 RX ADMIN — Medication 800 MILLIGRAM(S): at 06:33

## 2024-06-10 RX ADMIN — PANTOPRAZOLE SODIUM 40 MILLIGRAM(S): 40 INJECTION, POWDER, FOR SOLUTION INTRAVENOUS at 06:33

## 2024-06-10 RX ADMIN — Medication 1000 MILLIGRAM(S): at 07:32

## 2024-06-10 RX ADMIN — Medication 50 MILLIGRAM(S): at 06:26

## 2024-06-10 RX ADMIN — MESALAMINE 250 MILLIGRAM(S): 500 CAPSULE ORAL at 17:46

## 2024-06-10 RX ADMIN — MESALAMINE 250 MILLIGRAM(S): 500 CAPSULE ORAL at 00:43

## 2024-06-10 RX ADMIN — INSULIN LISPRO 2: 100 INJECTION, SOLUTION SUBCUTANEOUS at 12:22

## 2024-06-10 RX ADMIN — ENOXAPARIN SODIUM 40 MILLIGRAM(S): 100 INJECTION SUBCUTANEOUS at 06:26

## 2024-06-10 RX ADMIN — MESALAMINE 250 MILLIGRAM(S): 500 CAPSULE ORAL at 12:11

## 2024-06-10 RX ADMIN — ASPIRIN 81 MILLIGRAM(S): 325 TABLET, FILM COATED ORAL at 12:11

## 2024-06-10 RX ADMIN — RANOLAZINE 500 MILLIGRAM(S): 500 TABLET, EXTENDED RELEASE ORAL at 06:33

## 2024-06-10 RX ADMIN — Medication 800 MILLIGRAM(S): at 22:51

## 2024-06-10 RX ADMIN — MESALAMINE 250 MILLIGRAM(S): 500 CAPSULE ORAL at 22:54

## 2024-06-10 RX ADMIN — ONDANSETRON HYDROCHLORIDE 4 MILLIGRAM(S): 2 INJECTION INTRAMUSCULAR; INTRAVENOUS at 17:45

## 2024-06-10 RX ADMIN — OXYCODONE HYDROCHLORIDE 60 MILLIGRAM(S): 100 SOLUTION ORAL at 06:34

## 2024-06-10 RX ADMIN — ONDANSETRON HYDROCHLORIDE 4 MILLIGRAM(S): 2 INJECTION INTRAMUSCULAR; INTRAVENOUS at 22:54

## 2024-06-10 RX ADMIN — INSULIN GLARGINE 5 UNIT(S): 100 INJECTION, SOLUTION SUBCUTANEOUS at 22:52

## 2024-06-10 RX ADMIN — MESALAMINE 250 MILLIGRAM(S): 500 CAPSULE ORAL at 06:33

## 2024-06-10 RX ADMIN — Medication 800 MILLIGRAM(S): at 14:34

## 2024-06-10 RX ADMIN — Medication 1000 MILLIGRAM(S): at 06:32

## 2024-06-10 RX ADMIN — Medication 114 MILLIGRAM(S): at 06:25

## 2024-06-10 RX ADMIN — OXYCODONE HYDROCHLORIDE 60 MILLIGRAM(S): 100 SOLUTION ORAL at 07:34

## 2024-06-11 DIAGNOSIS — E87.8 OTHER DISORDERS OF ELECTROLYTE AND FLUID BALANCE, NOT ELSEWHERE CLASSIFIED: ICD-10-CM

## 2024-06-11 LAB
ADD ON TEST-SPECIMEN IN LAB: SIGNIFICANT CHANGE UP
ALBUMIN SERPL ELPH-MCNC: 3.2 G/DL — LOW (ref 3.3–5)
ALP SERPL-CCNC: 91 U/L — SIGNIFICANT CHANGE UP (ref 40–120)
ALT FLD-CCNC: 27 U/L — SIGNIFICANT CHANGE UP (ref 10–45)
ANION GAP SERPL CALC-SCNC: 8 MMOL/L — SIGNIFICANT CHANGE UP (ref 5–17)
AST SERPL-CCNC: 15 U/L — SIGNIFICANT CHANGE UP (ref 10–40)
BILIRUB DIRECT SERPL-MCNC: <0.2 MG/DL — SIGNIFICANT CHANGE UP (ref 0–0.3)
BILIRUB INDIRECT FLD-MCNC: SIGNIFICANT CHANGE UP MG/DL (ref 0.2–1)
BILIRUB SERPL-MCNC: 0.3 MG/DL — SIGNIFICANT CHANGE UP (ref 0.2–1.2)
BUN SERPL-MCNC: 7 MG/DL — SIGNIFICANT CHANGE UP (ref 7–23)
CALCIUM SERPL-MCNC: 8.5 MG/DL — SIGNIFICANT CHANGE UP (ref 8.4–10.5)
CHLORIDE SERPL-SCNC: 101 MMOL/L — SIGNIFICANT CHANGE UP (ref 96–108)
CO2 SERPL-SCNC: 34 MMOL/L — HIGH (ref 22–31)
CREAT SERPL-MCNC: 0.5 MG/DL — SIGNIFICANT CHANGE UP (ref 0.5–1.3)
EGFR: 119 ML/MIN/1.73M2 — SIGNIFICANT CHANGE UP
GLUCOSE BLDC GLUCOMTR-MCNC: 119 MG/DL — HIGH (ref 70–99)
GLUCOSE BLDC GLUCOMTR-MCNC: 143 MG/DL — HIGH (ref 70–99)
GLUCOSE BLDC GLUCOMTR-MCNC: 152 MG/DL — HIGH (ref 70–99)
GLUCOSE BLDC GLUCOMTR-MCNC: 176 MG/DL — HIGH (ref 70–99)
GLUCOSE BLDC GLUCOMTR-MCNC: 178 MG/DL — HIGH (ref 70–99)
GLUCOSE SERPL-MCNC: 131 MG/DL — HIGH (ref 70–99)
HCT VFR BLD CALC: 36 % — LOW (ref 39–50)
HGB BLD-MCNC: 11.1 G/DL — LOW (ref 13–17)
IGA FLD-MCNC: 201 MG/DL — SIGNIFICANT CHANGE UP (ref 84–499)
IGG FLD-MCNC: 533 MG/DL — LOW (ref 610–1660)
IGM SERPL-MCNC: 45 MG/DL — SIGNIFICANT CHANGE UP (ref 35–242)
KAPPA LC SER QL IFE: 1.21 MG/DL — SIGNIFICANT CHANGE UP (ref 0.33–1.94)
KAPPA/LAMBDA FREE LIGHT CHAIN RATIO, SERUM: 1.06 RATIO — SIGNIFICANT CHANGE UP (ref 0.26–1.65)
LAMBDA LC SER QL IFE: 1.14 MG/DL — SIGNIFICANT CHANGE UP (ref 0.57–2.63)
MAGNESIUM SERPL-MCNC: 1.5 MG/DL — LOW (ref 1.6–2.6)
MCHC RBC-ENTMCNC: 29 PG — SIGNIFICANT CHANGE UP (ref 27–34)
MCHC RBC-ENTMCNC: 30.8 GM/DL — LOW (ref 32–36)
MCV RBC AUTO: 94 FL — SIGNIFICANT CHANGE UP (ref 80–100)
NRBC # BLD: 0 /100 WBCS — SIGNIFICANT CHANGE UP (ref 0–0)
PHOSPHATE SERPL-MCNC: 2.2 MG/DL — LOW (ref 2.5–4.5)
PLATELET # BLD AUTO: 66 K/UL — LOW (ref 150–400)
POTASSIUM SERPL-MCNC: 3.9 MMOL/L — SIGNIFICANT CHANGE UP (ref 3.5–5.3)
POTASSIUM SERPL-SCNC: 3.9 MMOL/L — SIGNIFICANT CHANGE UP (ref 3.5–5.3)
PROT SERPL-MCNC: 5.9 G/DL — LOW (ref 6–8.3)
RBC # BLD: 3.83 M/UL — LOW (ref 4.2–5.8)
RBC # FLD: 14.3 % — SIGNIFICANT CHANGE UP (ref 10.3–14.5)
SODIUM SERPL-SCNC: 143 MMOL/L — SIGNIFICANT CHANGE UP (ref 135–145)
WBC # BLD: 5.84 K/UL — SIGNIFICANT CHANGE UP (ref 3.8–10.5)
WBC # FLD AUTO: 5.84 K/UL — SIGNIFICANT CHANGE UP (ref 3.8–10.5)

## 2024-06-11 PROCEDURE — 99233 SBSQ HOSP IP/OBS HIGH 50: CPT

## 2024-06-11 RX ORDER — DIAZEPAM 10 MG/1
5 TABLET ORAL EVERY 8 HOURS
Refills: 0 | Status: DISCONTINUED | OUTPATIENT
Start: 2024-06-11 | End: 2024-06-17

## 2024-06-11 RX ORDER — OXYCODONE HYDROCHLORIDE 100 MG/5ML
60 SOLUTION ORAL
Refills: 0 | Status: DISCONTINUED | OUTPATIENT
Start: 2024-06-11 | End: 2024-06-17

## 2024-06-11 RX ADMIN — FOSCARNET SODIUM 500 MILLIGRAM(S): 24 INJECTION, SOLUTION INTRAVENOUS at 21:56

## 2024-06-11 RX ADMIN — OXYCODONE HYDROCHLORIDE 60 MILLIGRAM(S): 100 SOLUTION ORAL at 01:31

## 2024-06-11 RX ADMIN — ONDANSETRON HYDROCHLORIDE 4 MILLIGRAM(S): 2 INJECTION INTRAMUSCULAR; INTRAVENOUS at 06:34

## 2024-06-11 RX ADMIN — Medication 10 MILLIGRAM(S): at 14:50

## 2024-06-11 RX ADMIN — OXYCODONE HYDROCHLORIDE 60 MILLIGRAM(S): 100 SOLUTION ORAL at 01:30

## 2024-06-11 RX ADMIN — MESALAMINE 250 MILLIGRAM(S): 500 CAPSULE ORAL at 06:36

## 2024-06-11 RX ADMIN — Medication 10 MILLIGRAM(S): at 06:35

## 2024-06-11 RX ADMIN — Medication 10 MILLIGRAM(S): at 21:58

## 2024-06-11 RX ADMIN — ISOSORBIDE DINITRATE 30 MILLIGRAM(S): 5 TABLET ORAL at 10:23

## 2024-06-11 RX ADMIN — ATORVASTATIN CALCIUM 40 MILLIGRAM(S): 20 TABLET, FILM COATED ORAL at 21:58

## 2024-06-11 RX ADMIN — ONDANSETRON HYDROCHLORIDE 4 MILLIGRAM(S): 2 INJECTION INTRAMUSCULAR; INTRAVENOUS at 12:22

## 2024-06-11 RX ADMIN — DIAZEPAM 5 MILLIGRAM(S): 10 TABLET ORAL at 14:49

## 2024-06-11 RX ADMIN — Medication 50 MILLIGRAM(S): at 17:52

## 2024-06-11 RX ADMIN — Medication 2 TABLET(S): at 21:58

## 2024-06-11 RX ADMIN — Medication 1000 MILLIGRAM(S): at 21:57

## 2024-06-11 RX ADMIN — MESALAMINE 250 MILLIGRAM(S): 500 CAPSULE ORAL at 23:31

## 2024-06-11 RX ADMIN — INSULIN LISPRO 2: 100 INJECTION, SOLUTION SUBCUTANEOUS at 07:21

## 2024-06-11 RX ADMIN — Medication 800 MILLIGRAM(S): at 06:35

## 2024-06-11 RX ADMIN — Medication 800 MILLIGRAM(S): at 14:49

## 2024-06-11 RX ADMIN — Medication 1000 MILLIGRAM(S): at 14:50

## 2024-06-11 RX ADMIN — Medication 1000 MILLIGRAM(S): at 22:57

## 2024-06-11 RX ADMIN — OXYCODONE HYDROCHLORIDE 60 MILLIGRAM(S): 100 SOLUTION ORAL at 10:23

## 2024-06-11 RX ADMIN — POLYETHYLENE GLYCOL 3350 17 GRAM(S): 1 POWDER ORAL at 17:56

## 2024-06-11 RX ADMIN — MESALAMINE 250 MILLIGRAM(S): 500 CAPSULE ORAL at 17:56

## 2024-06-11 RX ADMIN — FOSCARNET SODIUM 500 MILLIGRAM(S): 24 INJECTION, SOLUTION INTRAVENOUS at 13:33

## 2024-06-11 RX ADMIN — Medication 800 MILLIGRAM(S): at 21:56

## 2024-06-11 RX ADMIN — INSULIN LISPRO 2: 100 INJECTION, SOLUTION SUBCUTANEOUS at 21:57

## 2024-06-11 RX ADMIN — Medication 1000 MILLIGRAM(S): at 07:00

## 2024-06-11 RX ADMIN — Medication 5 MILLIGRAM(S): at 21:58

## 2024-06-11 RX ADMIN — LORATADINE 10 MILLIGRAM(S): 10 TABLET ORAL at 12:22

## 2024-06-11 RX ADMIN — INSULIN GLARGINE 5 UNIT(S): 100 INJECTION, SOLUTION SUBCUTANEOUS at 21:58

## 2024-06-11 RX ADMIN — ENOXAPARIN SODIUM 40 MILLIGRAM(S): 100 INJECTION SUBCUTANEOUS at 06:33

## 2024-06-11 RX ADMIN — MESALAMINE 250 MILLIGRAM(S): 500 CAPSULE ORAL at 13:32

## 2024-06-11 RX ADMIN — DIAZEPAM 5 MILLIGRAM(S): 10 TABLET ORAL at 21:56

## 2024-06-11 RX ADMIN — ASPIRIN 81 MILLIGRAM(S): 325 TABLET, FILM COATED ORAL at 12:21

## 2024-06-11 RX ADMIN — RANOLAZINE 500 MILLIGRAM(S): 500 TABLET, EXTENDED RELEASE ORAL at 17:52

## 2024-06-11 RX ADMIN — POLYETHYLENE GLYCOL 3350 17 GRAM(S): 1 POWDER ORAL at 06:36

## 2024-06-11 RX ADMIN — Medication 50 MILLIGRAM(S): at 06:35

## 2024-06-11 RX ADMIN — ENOXAPARIN SODIUM 40 MILLIGRAM(S): 100 INJECTION SUBCUTANEOUS at 17:56

## 2024-06-11 RX ADMIN — RANOLAZINE 500 MILLIGRAM(S): 500 TABLET, EXTENDED RELEASE ORAL at 06:36

## 2024-06-11 RX ADMIN — Medication 1000 MILLIGRAM(S): at 06:34

## 2024-06-11 RX ADMIN — Medication 1000 MILLIGRAM(S): at 00:25

## 2024-06-11 RX ADMIN — DEXTROSE MONOHYDRATE AND SODIUM CHLORIDE 125 MILLILITER(S): 5; .3 INJECTION, SOLUTION INTRAVENOUS at 08:10

## 2024-06-11 RX ADMIN — DIAZEPAM 5 MILLIGRAM(S): 10 TABLET ORAL at 06:34

## 2024-06-11 RX ADMIN — FOSCARNET SODIUM 500 MILLIGRAM(S): 24 INJECTION, SOLUTION INTRAVENOUS at 08:18

## 2024-06-11 RX ADMIN — ONDANSETRON HYDROCHLORIDE 4 MILLIGRAM(S): 2 INJECTION INTRAMUSCULAR; INTRAVENOUS at 17:51

## 2024-06-11 RX ADMIN — HYDROMORPHONE HCL 1 MILLIGRAM(S): 0.2 INJECTION, SOLUTION INTRAVENOUS at 17:52

## 2024-06-11 RX ADMIN — PANTOPRAZOLE SODIUM 40 MILLIGRAM(S): 40 INJECTION, POWDER, FOR SOLUTION INTRAVENOUS at 06:36

## 2024-06-12 LAB
ADD ON TEST-SPECIMEN IN LAB: SIGNIFICANT CHANGE UP
ANION GAP SERPL CALC-SCNC: 10 MMOL/L — SIGNIFICANT CHANGE UP (ref 5–17)
BUN SERPL-MCNC: 6 MG/DL — LOW (ref 7–23)
CALCIUM SERPL-MCNC: 8.8 MG/DL — SIGNIFICANT CHANGE UP (ref 8.4–10.5)
CHLORIDE SERPL-SCNC: 99 MMOL/L — SIGNIFICANT CHANGE UP (ref 96–108)
CO2 SERPL-SCNC: 30 MMOL/L — SIGNIFICANT CHANGE UP (ref 22–31)
CREAT SERPL-MCNC: 0.57 MG/DL — SIGNIFICANT CHANGE UP (ref 0.5–1.3)
EGFR: 114 ML/MIN/1.73M2 — SIGNIFICANT CHANGE UP
GLUCOSE BLDC GLUCOMTR-MCNC: 127 MG/DL — HIGH (ref 70–99)
GLUCOSE BLDC GLUCOMTR-MCNC: 127 MG/DL — HIGH (ref 70–99)
GLUCOSE BLDC GLUCOMTR-MCNC: 160 MG/DL — HIGH (ref 70–99)
GLUCOSE BLDC GLUCOMTR-MCNC: 172 MG/DL — HIGH (ref 70–99)
GLUCOSE SERPL-MCNC: 126 MG/DL — HIGH (ref 70–99)
HCT VFR BLD CALC: 34.6 % — LOW (ref 39–50)
HGB BLD-MCNC: 11.3 G/DL — LOW (ref 13–17)
HSV DNA1: SIGNIFICANT CHANGE UP
HSV DNA2: SIGNIFICANT CHANGE UP
HSV1 DNA BLD QL NAA+PROBE: SIGNIFICANT CHANGE UP
HSV2 DNA BLD QL NAA+PROBE: SIGNIFICANT CHANGE UP
MAGNESIUM SERPL-MCNC: 1.5 MG/DL — LOW (ref 1.6–2.6)
MCHC RBC-ENTMCNC: 28.8 PG — SIGNIFICANT CHANGE UP (ref 27–34)
MCHC RBC-ENTMCNC: 32.7 GM/DL — SIGNIFICANT CHANGE UP (ref 32–36)
MCV RBC AUTO: 88 FL — SIGNIFICANT CHANGE UP (ref 80–100)
NRBC # BLD: 0 /100 WBCS — SIGNIFICANT CHANGE UP (ref 0–0)
PHOSPHATE SERPL-MCNC: 2.7 MG/DL — SIGNIFICANT CHANGE UP (ref 2.5–4.5)
PLATELET # BLD AUTO: 75 K/UL — LOW (ref 150–400)
POTASSIUM SERPL-MCNC: 3.5 MMOL/L — SIGNIFICANT CHANGE UP (ref 3.5–5.3)
POTASSIUM SERPL-SCNC: 3.5 MMOL/L — SIGNIFICANT CHANGE UP (ref 3.5–5.3)
RBC # BLD: 3.93 M/UL — LOW (ref 4.2–5.8)
RBC # FLD: 14.3 % — SIGNIFICANT CHANGE UP (ref 10.3–14.5)
SODIUM SERPL-SCNC: 139 MMOL/L — SIGNIFICANT CHANGE UP (ref 135–145)
WBC # BLD: 7.56 K/UL — SIGNIFICANT CHANGE UP (ref 3.8–10.5)
WBC # FLD AUTO: 7.56 K/UL — SIGNIFICANT CHANGE UP (ref 3.8–10.5)

## 2024-06-12 PROCEDURE — 99233 SBSQ HOSP IP/OBS HIGH 50: CPT

## 2024-06-12 PROCEDURE — 99222 1ST HOSP IP/OBS MODERATE 55: CPT

## 2024-06-12 RX ORDER — MAGNESIUM SULFATE 100 %
2 POWDER (GRAM) MISCELLANEOUS ONCE
Refills: 0 | Status: COMPLETED | OUTPATIENT
Start: 2024-06-12 | End: 2024-06-12

## 2024-06-12 RX ORDER — ACETAMINOPHEN 325 MG
650 TABLET ORAL ONCE
Refills: 0 | Status: COMPLETED | OUTPATIENT
Start: 2024-06-12 | End: 2024-06-12

## 2024-06-12 RX ORDER — DIPHENHYDRAMINE HCL 12.5MG/5ML
25 ELIXIR ORAL ONCE
Refills: 0 | Status: COMPLETED | OUTPATIENT
Start: 2024-06-12 | End: 2024-06-12

## 2024-06-12 RX ORDER — HYDROMORPHONE HCL 0.2 MG/ML
1 INJECTION, SOLUTION INTRAVENOUS
Refills: 0 | Status: DISCONTINUED | OUTPATIENT
Start: 2024-06-12 | End: 2024-06-19

## 2024-06-12 RX ORDER — IMMUNE GLOBULIN (HUMAN) 10 G/100ML
18 INJECTION INTRAVENOUS; SUBCUTANEOUS ONCE
Refills: 0 | Status: COMPLETED | OUTPATIENT
Start: 2024-06-12 | End: 2024-06-12

## 2024-06-12 RX ORDER — LIDOCAINE HCL 28 MG/G
1 GEL TOPICAL ONCE
Refills: 0 | Status: COMPLETED | OUTPATIENT
Start: 2024-06-12 | End: 2024-06-12

## 2024-06-12 RX ADMIN — ISOSORBIDE DINITRATE 30 MILLIGRAM(S): 5 TABLET ORAL at 09:26

## 2024-06-12 RX ADMIN — INSULIN GLARGINE 5 UNIT(S): 100 INJECTION, SOLUTION SUBCUTANEOUS at 21:31

## 2024-06-12 RX ADMIN — LIDOCAINE HCL 1 PATCH: 28 GEL TOPICAL at 13:04

## 2024-06-12 RX ADMIN — LORATADINE 10 MILLIGRAM(S): 10 TABLET ORAL at 13:03

## 2024-06-12 RX ADMIN — Medication 25 GRAM(S): at 13:04

## 2024-06-12 RX ADMIN — Medication 10 MILLIGRAM(S): at 21:31

## 2024-06-12 RX ADMIN — PANTOPRAZOLE SODIUM 40 MILLIGRAM(S): 40 INJECTION, POWDER, FOR SOLUTION INTRAVENOUS at 06:54

## 2024-06-12 RX ADMIN — FOSCARNET SODIUM 500 MILLIGRAM(S): 24 INJECTION, SOLUTION INTRAVENOUS at 21:32

## 2024-06-12 RX ADMIN — INSULIN LISPRO 2: 100 INJECTION, SOLUTION SUBCUTANEOUS at 12:51

## 2024-06-12 RX ADMIN — OXYCODONE HYDROCHLORIDE 60 MILLIGRAM(S): 100 SOLUTION ORAL at 05:03

## 2024-06-12 RX ADMIN — ENOXAPARIN SODIUM 40 MILLIGRAM(S): 100 INJECTION SUBCUTANEOUS at 17:53

## 2024-06-12 RX ADMIN — Medication 10 MILLIGRAM(S): at 13:03

## 2024-06-12 RX ADMIN — Medication 1000 MILLIGRAM(S): at 21:30

## 2024-06-12 RX ADMIN — Medication 1000 MILLIGRAM(S): at 07:15

## 2024-06-12 RX ADMIN — FOSCARNET SODIUM 500 MILLIGRAM(S): 24 INJECTION, SOLUTION INTRAVENOUS at 06:13

## 2024-06-12 RX ADMIN — MESALAMINE 250 MILLIGRAM(S): 500 CAPSULE ORAL at 13:03

## 2024-06-12 RX ADMIN — ONDANSETRON HYDROCHLORIDE 4 MILLIGRAM(S): 2 INJECTION INTRAMUSCULAR; INTRAVENOUS at 06:14

## 2024-06-12 RX ADMIN — Medication 2 TABLET(S): at 21:31

## 2024-06-12 RX ADMIN — RANOLAZINE 500 MILLIGRAM(S): 500 TABLET, EXTENDED RELEASE ORAL at 17:55

## 2024-06-12 RX ADMIN — MESALAMINE 250 MILLIGRAM(S): 500 CAPSULE ORAL at 17:54

## 2024-06-12 RX ADMIN — POLYETHYLENE GLYCOL 3350 17 GRAM(S): 1 POWDER ORAL at 06:14

## 2024-06-12 RX ADMIN — INSULIN LISPRO 2: 100 INJECTION, SOLUTION SUBCUTANEOUS at 21:52

## 2024-06-12 RX ADMIN — Medication 800 MILLIGRAM(S): at 06:14

## 2024-06-12 RX ADMIN — ENOXAPARIN SODIUM 40 MILLIGRAM(S): 100 INJECTION SUBCUTANEOUS at 06:14

## 2024-06-12 RX ADMIN — Medication 1000 MILLIGRAM(S): at 06:15

## 2024-06-12 RX ADMIN — ATORVASTATIN CALCIUM 40 MILLIGRAM(S): 20 TABLET, FILM COATED ORAL at 21:31

## 2024-06-12 RX ADMIN — Medication 50 MILLIGRAM(S): at 06:15

## 2024-06-12 RX ADMIN — DIAZEPAM 5 MILLIGRAM(S): 10 TABLET ORAL at 13:03

## 2024-06-12 RX ADMIN — Medication 650 MILLIGRAM(S): at 17:54

## 2024-06-12 RX ADMIN — Medication 800 MILLIGRAM(S): at 21:31

## 2024-06-12 RX ADMIN — ASPIRIN 81 MILLIGRAM(S): 325 TABLET, FILM COATED ORAL at 13:03

## 2024-06-12 RX ADMIN — Medication 800 MILLIGRAM(S): at 13:03

## 2024-06-12 RX ADMIN — Medication 1000 MILLIGRAM(S): at 13:03

## 2024-06-12 RX ADMIN — FOSCARNET SODIUM 500 MILLIGRAM(S): 24 INJECTION, SOLUTION INTRAVENOUS at 13:04

## 2024-06-12 RX ADMIN — MESALAMINE 250 MILLIGRAM(S): 500 CAPSULE ORAL at 06:14

## 2024-06-12 RX ADMIN — RANOLAZINE 500 MILLIGRAM(S): 500 TABLET, EXTENDED RELEASE ORAL at 06:15

## 2024-06-12 RX ADMIN — DIAZEPAM 5 MILLIGRAM(S): 10 TABLET ORAL at 21:31

## 2024-06-12 RX ADMIN — LIDOCAINE HCL 1 PATCH: 28 GEL TOPICAL at 19:30

## 2024-06-12 RX ADMIN — ONDANSETRON HYDROCHLORIDE 4 MILLIGRAM(S): 2 INJECTION INTRAMUSCULAR; INTRAVENOUS at 13:03

## 2024-06-12 RX ADMIN — Medication 10 MILLIGRAM(S): at 06:15

## 2024-06-12 RX ADMIN — IMMUNE GLOBULIN (HUMAN) 60 GRAM(S): 10 INJECTION INTRAVENOUS; SUBCUTANEOUS at 18:40

## 2024-06-12 RX ADMIN — OXYCODONE HYDROCHLORIDE 60 MILLIGRAM(S): 100 SOLUTION ORAL at 04:03

## 2024-06-12 RX ADMIN — Medication 25 MILLIGRAM(S): at 17:55

## 2024-06-12 RX ADMIN — POLYETHYLENE GLYCOL 3350 17 GRAM(S): 1 POWDER ORAL at 17:54

## 2024-06-12 RX ADMIN — DIAZEPAM 5 MILLIGRAM(S): 10 TABLET ORAL at 06:14

## 2024-06-12 RX ADMIN — Medication 5 MILLIGRAM(S): at 21:31

## 2024-06-13 DIAGNOSIS — D80.1 NONFAMILIAL HYPOGAMMAGLOBULINEMIA: ICD-10-CM

## 2024-06-13 LAB
ANION GAP SERPL CALC-SCNC: 8 MMOL/L — SIGNIFICANT CHANGE UP (ref 5–17)
BUN SERPL-MCNC: 7 MG/DL — SIGNIFICANT CHANGE UP (ref 7–23)
CALCIUM SERPL-MCNC: 9 MG/DL — SIGNIFICANT CHANGE UP (ref 8.4–10.5)
CHLORIDE SERPL-SCNC: 102 MMOL/L — SIGNIFICANT CHANGE UP (ref 96–108)
CO2 SERPL-SCNC: 31 MMOL/L — SIGNIFICANT CHANGE UP (ref 22–31)
CREAT SERPL-MCNC: 0.51 MG/DL — SIGNIFICANT CHANGE UP (ref 0.5–1.3)
EGFR: 118 ML/MIN/1.73M2 — SIGNIFICANT CHANGE UP
GLUCOSE BLDC GLUCOMTR-MCNC: 133 MG/DL — HIGH (ref 70–99)
GLUCOSE BLDC GLUCOMTR-MCNC: 156 MG/DL — HIGH (ref 70–99)
GLUCOSE BLDC GLUCOMTR-MCNC: 187 MG/DL — HIGH (ref 70–99)
GLUCOSE BLDC GLUCOMTR-MCNC: 195 MG/DL — HIGH (ref 70–99)
GLUCOSE SERPL-MCNC: 144 MG/DL — HIGH (ref 70–99)
HCT VFR BLD CALC: 36.1 % — LOW (ref 39–50)
HGB BLD-MCNC: 11.5 G/DL — LOW (ref 13–17)
MAGNESIUM SERPL-MCNC: 1.6 MG/DL — SIGNIFICANT CHANGE UP (ref 1.6–2.6)
MCHC RBC-ENTMCNC: 28.7 PG — SIGNIFICANT CHANGE UP (ref 27–34)
MCHC RBC-ENTMCNC: 31.9 GM/DL — LOW (ref 32–36)
MCV RBC AUTO: 90 FL — SIGNIFICANT CHANGE UP (ref 80–100)
NRBC # BLD: 0 /100 WBCS — SIGNIFICANT CHANGE UP (ref 0–0)
PHOSPHATE SERPL-MCNC: 2.5 MG/DL — SIGNIFICANT CHANGE UP (ref 2.5–4.5)
PLATELET # BLD AUTO: 65 K/UL — LOW (ref 150–400)
POTASSIUM SERPL-MCNC: 4.1 MMOL/L — SIGNIFICANT CHANGE UP (ref 3.5–5.3)
POTASSIUM SERPL-SCNC: 4.1 MMOL/L — SIGNIFICANT CHANGE UP (ref 3.5–5.3)
RBC # BLD: 4.01 M/UL — LOW (ref 4.2–5.8)
RBC # FLD: 14.5 % — SIGNIFICANT CHANGE UP (ref 10.3–14.5)
SODIUM SERPL-SCNC: 141 MMOL/L — SIGNIFICANT CHANGE UP (ref 135–145)
WBC # BLD: 4.89 K/UL — SIGNIFICANT CHANGE UP (ref 3.8–10.5)
WBC # FLD AUTO: 4.89 K/UL — SIGNIFICANT CHANGE UP (ref 3.8–10.5)

## 2024-06-13 PROCEDURE — 99233 SBSQ HOSP IP/OBS HIGH 50: CPT

## 2024-06-13 PROCEDURE — 99232 SBSQ HOSP IP/OBS MODERATE 35: CPT

## 2024-06-13 RX ORDER — MAGNESIUM SULFATE 100 %
2 POWDER (GRAM) MISCELLANEOUS ONCE
Refills: 0 | Status: COMPLETED | OUTPATIENT
Start: 2024-06-13 | End: 2024-06-13

## 2024-06-13 RX ADMIN — ENOXAPARIN SODIUM 40 MILLIGRAM(S): 100 INJECTION SUBCUTANEOUS at 18:07

## 2024-06-13 RX ADMIN — POLYETHYLENE GLYCOL 3350 17 GRAM(S): 1 POWDER ORAL at 18:07

## 2024-06-13 RX ADMIN — Medication 800 MILLIGRAM(S): at 14:25

## 2024-06-13 RX ADMIN — Medication 800 MILLIGRAM(S): at 06:18

## 2024-06-13 RX ADMIN — MESALAMINE 250 MILLIGRAM(S): 500 CAPSULE ORAL at 06:16

## 2024-06-13 RX ADMIN — ONDANSETRON HYDROCHLORIDE 4 MILLIGRAM(S): 2 INJECTION INTRAMUSCULAR; INTRAVENOUS at 06:18

## 2024-06-13 RX ADMIN — ASPIRIN 81 MILLIGRAM(S): 325 TABLET, FILM COATED ORAL at 12:02

## 2024-06-13 RX ADMIN — MESALAMINE 250 MILLIGRAM(S): 500 CAPSULE ORAL at 18:06

## 2024-06-13 RX ADMIN — ENOXAPARIN SODIUM 40 MILLIGRAM(S): 100 INJECTION SUBCUTANEOUS at 06:18

## 2024-06-13 RX ADMIN — ISOSORBIDE DINITRATE 30 MILLIGRAM(S): 5 TABLET ORAL at 10:32

## 2024-06-13 RX ADMIN — INSULIN GLARGINE 5 UNIT(S): 100 INJECTION, SOLUTION SUBCUTANEOUS at 22:06

## 2024-06-13 RX ADMIN — OXYCODONE HYDROCHLORIDE 60 MILLIGRAM(S): 100 SOLUTION ORAL at 18:50

## 2024-06-13 RX ADMIN — DIAZEPAM 5 MILLIGRAM(S): 10 TABLET ORAL at 06:15

## 2024-06-13 RX ADMIN — ONDANSETRON HYDROCHLORIDE 4 MILLIGRAM(S): 2 INJECTION INTRAMUSCULAR; INTRAVENOUS at 23:07

## 2024-06-13 RX ADMIN — Medication 10 MILLIGRAM(S): at 14:26

## 2024-06-13 RX ADMIN — RANOLAZINE 500 MILLIGRAM(S): 500 TABLET, EXTENDED RELEASE ORAL at 18:07

## 2024-06-13 RX ADMIN — Medication 25 GRAM(S): at 16:03

## 2024-06-13 RX ADMIN — OXYCODONE HYDROCHLORIDE 60 MILLIGRAM(S): 100 SOLUTION ORAL at 12:01

## 2024-06-13 RX ADMIN — LIDOCAINE HCL 1 PATCH: 28 GEL TOPICAL at 00:00

## 2024-06-13 RX ADMIN — DIAZEPAM 5 MILLIGRAM(S): 10 TABLET ORAL at 14:26

## 2024-06-13 RX ADMIN — MESALAMINE 250 MILLIGRAM(S): 500 CAPSULE ORAL at 12:01

## 2024-06-13 RX ADMIN — Medication 50 MILLIGRAM(S): at 18:06

## 2024-06-13 RX ADMIN — FOSCARNET SODIUM 500 MILLIGRAM(S): 24 INJECTION, SOLUTION INTRAVENOUS at 14:25

## 2024-06-13 RX ADMIN — Medication 10 MILLIGRAM(S): at 22:07

## 2024-06-13 RX ADMIN — OXYCODONE HYDROCHLORIDE 60 MILLIGRAM(S): 100 SOLUTION ORAL at 18:07

## 2024-06-13 RX ADMIN — OXYCODONE HYDROCHLORIDE 60 MILLIGRAM(S): 100 SOLUTION ORAL at 06:23

## 2024-06-13 RX ADMIN — RANOLAZINE 500 MILLIGRAM(S): 500 TABLET, EXTENDED RELEASE ORAL at 06:14

## 2024-06-13 RX ADMIN — Medication 1000 MILLIGRAM(S): at 06:16

## 2024-06-13 RX ADMIN — ONDANSETRON HYDROCHLORIDE 4 MILLIGRAM(S): 2 INJECTION INTRAMUSCULAR; INTRAVENOUS at 18:07

## 2024-06-13 RX ADMIN — MESALAMINE 250 MILLIGRAM(S): 500 CAPSULE ORAL at 23:07

## 2024-06-13 RX ADMIN — LORATADINE 10 MILLIGRAM(S): 10 TABLET ORAL at 12:02

## 2024-06-13 RX ADMIN — Medication 1000 MILLIGRAM(S): at 23:07

## 2024-06-13 RX ADMIN — FOSCARNET SODIUM 500 MILLIGRAM(S): 24 INJECTION, SOLUTION INTRAVENOUS at 22:07

## 2024-06-13 RX ADMIN — INSULIN LISPRO 2: 100 INJECTION, SOLUTION SUBCUTANEOUS at 22:06

## 2024-06-13 RX ADMIN — INSULIN LISPRO 2: 100 INJECTION, SOLUTION SUBCUTANEOUS at 18:06

## 2024-06-13 RX ADMIN — OXYCODONE HYDROCHLORIDE 60 MILLIGRAM(S): 100 SOLUTION ORAL at 06:55

## 2024-06-13 RX ADMIN — OXYCODONE HYDROCHLORIDE 60 MILLIGRAM(S): 100 SOLUTION ORAL at 00:34

## 2024-06-13 RX ADMIN — ONDANSETRON HYDROCHLORIDE 4 MILLIGRAM(S): 2 INJECTION INTRAMUSCULAR; INTRAVENOUS at 12:02

## 2024-06-13 RX ADMIN — OXYCODONE HYDROCHLORIDE 60 MILLIGRAM(S): 100 SOLUTION ORAL at 12:42

## 2024-06-13 RX ADMIN — POLYETHYLENE GLYCOL 3350 17 GRAM(S): 1 POWDER ORAL at 06:17

## 2024-06-13 RX ADMIN — ATORVASTATIN CALCIUM 40 MILLIGRAM(S): 20 TABLET, FILM COATED ORAL at 22:07

## 2024-06-13 RX ADMIN — MESALAMINE 250 MILLIGRAM(S): 500 CAPSULE ORAL at 00:36

## 2024-06-13 RX ADMIN — Medication 1000 MILLIGRAM(S): at 22:07

## 2024-06-13 RX ADMIN — Medication 1000 MILLIGRAM(S): at 14:26

## 2024-06-13 RX ADMIN — PANTOPRAZOLE SODIUM 40 MILLIGRAM(S): 40 INJECTION, POWDER, FOR SOLUTION INTRAVENOUS at 06:17

## 2024-06-13 RX ADMIN — OXYCODONE HYDROCHLORIDE 60 MILLIGRAM(S): 100 SOLUTION ORAL at 01:00

## 2024-06-13 RX ADMIN — Medication 10 MILLIGRAM(S): at 06:17

## 2024-06-13 RX ADMIN — DIAZEPAM 5 MILLIGRAM(S): 10 TABLET ORAL at 22:07

## 2024-06-13 RX ADMIN — Medication 50 MILLIGRAM(S): at 06:14

## 2024-06-13 RX ADMIN — Medication 800 MILLIGRAM(S): at 22:07

## 2024-06-13 RX ADMIN — INSULIN LISPRO 2: 100 INJECTION, SOLUTION SUBCUTANEOUS at 12:02

## 2024-06-13 RX ADMIN — FOSCARNET SODIUM 500 MILLIGRAM(S): 24 INJECTION, SOLUTION INTRAVENOUS at 06:18

## 2024-06-14 DIAGNOSIS — R09.02 HYPOXEMIA: ICD-10-CM

## 2024-06-14 LAB
ANION GAP SERPL CALC-SCNC: 9 MMOL/L — SIGNIFICANT CHANGE UP (ref 5–17)
BUN SERPL-MCNC: 7 MG/DL — SIGNIFICANT CHANGE UP (ref 7–23)
CALCIUM SERPL-MCNC: 9.4 MG/DL — SIGNIFICANT CHANGE UP (ref 8.4–10.5)
CHLORIDE SERPL-SCNC: 102 MMOL/L — SIGNIFICANT CHANGE UP (ref 96–108)
CO2 SERPL-SCNC: 32 MMOL/L — HIGH (ref 22–31)
CREAT SERPL-MCNC: 0.62 MG/DL — SIGNIFICANT CHANGE UP (ref 0.5–1.3)
EGFR: 111 ML/MIN/1.73M2 — SIGNIFICANT CHANGE UP
GLUCOSE BLDC GLUCOMTR-MCNC: 129 MG/DL — HIGH (ref 70–99)
GLUCOSE BLDC GLUCOMTR-MCNC: 135 MG/DL — HIGH (ref 70–99)
GLUCOSE BLDC GLUCOMTR-MCNC: 137 MG/DL — HIGH (ref 70–99)
GLUCOSE BLDC GLUCOMTR-MCNC: 143 MG/DL — HIGH (ref 70–99)
GLUCOSE BLDC GLUCOMTR-MCNC: 152 MG/DL — HIGH (ref 70–99)
GLUCOSE SERPL-MCNC: 120 MG/DL — HIGH (ref 70–99)
HCT VFR BLD CALC: 37.1 % — LOW (ref 39–50)
HGB BLD-MCNC: 11.8 G/DL — LOW (ref 13–17)
MAGNESIUM SERPL-MCNC: 1.7 MG/DL — SIGNIFICANT CHANGE UP (ref 1.6–2.6)
MCHC RBC-ENTMCNC: 29 PG — SIGNIFICANT CHANGE UP (ref 27–34)
MCHC RBC-ENTMCNC: 31.8 GM/DL — LOW (ref 32–36)
MCV RBC AUTO: 91.2 FL — SIGNIFICANT CHANGE UP (ref 80–100)
NRBC # BLD: 0 /100 WBCS — SIGNIFICANT CHANGE UP (ref 0–0)
PHOSPHATE SERPL-MCNC: 3.3 MG/DL — SIGNIFICANT CHANGE UP (ref 2.5–4.5)
PLATELET # BLD AUTO: 69 K/UL — LOW (ref 150–400)
POTASSIUM SERPL-MCNC: 4.1 MMOL/L — SIGNIFICANT CHANGE UP (ref 3.5–5.3)
POTASSIUM SERPL-SCNC: 4.1 MMOL/L — SIGNIFICANT CHANGE UP (ref 3.5–5.3)
RBC # BLD: 4.07 M/UL — LOW (ref 4.2–5.8)
RBC # FLD: 14.6 % — HIGH (ref 10.3–14.5)
SODIUM SERPL-SCNC: 143 MMOL/L — SIGNIFICANT CHANGE UP (ref 135–145)
WBC # BLD: 5.52 K/UL — SIGNIFICANT CHANGE UP (ref 3.8–10.5)
WBC # FLD AUTO: 5.52 K/UL — SIGNIFICANT CHANGE UP (ref 3.8–10.5)

## 2024-06-14 PROCEDURE — 71045 X-RAY EXAM CHEST 1 VIEW: CPT | Mod: 26

## 2024-06-14 PROCEDURE — 99233 SBSQ HOSP IP/OBS HIGH 50: CPT

## 2024-06-14 RX ORDER — INSULIN LISPRO 100 [IU]/ML
3 INJECTION, SOLUTION SUBCUTANEOUS
Refills: 0 | Status: DISCONTINUED | OUTPATIENT
Start: 2024-06-14 | End: 2024-06-20

## 2024-06-14 RX ORDER — MAGNESIUM SULFATE 100 %
2 POWDER (GRAM) MISCELLANEOUS ONCE
Refills: 0 | Status: COMPLETED | OUTPATIENT
Start: 2024-06-14 | End: 2024-06-14

## 2024-06-14 RX ADMIN — OXYCODONE HYDROCHLORIDE 60 MILLIGRAM(S): 100 SOLUTION ORAL at 08:46

## 2024-06-14 RX ADMIN — FOSCARNET SODIUM 500 MILLIGRAM(S): 24 INJECTION, SOLUTION INTRAVENOUS at 15:27

## 2024-06-14 RX ADMIN — ATORVASTATIN CALCIUM 40 MILLIGRAM(S): 20 TABLET, FILM COATED ORAL at 21:33

## 2024-06-14 RX ADMIN — Medication 50 MILLIGRAM(S): at 18:52

## 2024-06-14 RX ADMIN — INSULIN LISPRO 3 UNIT(S): 100 INJECTION, SOLUTION SUBCUTANEOUS at 14:00

## 2024-06-14 RX ADMIN — INSULIN LISPRO 2: 100 INJECTION, SOLUTION SUBCUTANEOUS at 22:51

## 2024-06-14 RX ADMIN — FOSCARNET SODIUM 500 MILLIGRAM(S): 24 INJECTION, SOLUTION INTRAVENOUS at 21:37

## 2024-06-14 RX ADMIN — ONDANSETRON HYDROCHLORIDE 4 MILLIGRAM(S): 2 INJECTION INTRAMUSCULAR; INTRAVENOUS at 06:00

## 2024-06-14 RX ADMIN — DIAZEPAM 5 MILLIGRAM(S): 10 TABLET ORAL at 13:55

## 2024-06-14 RX ADMIN — ENOXAPARIN SODIUM 40 MILLIGRAM(S): 100 INJECTION SUBCUTANEOUS at 05:58

## 2024-06-14 RX ADMIN — Medication 50 MILLIGRAM(S): at 05:59

## 2024-06-14 RX ADMIN — INSULIN LISPRO 3 UNIT(S): 100 INJECTION, SOLUTION SUBCUTANEOUS at 18:53

## 2024-06-14 RX ADMIN — OXYCODONE HYDROCHLORIDE 60 MILLIGRAM(S): 100 SOLUTION ORAL at 20:29

## 2024-06-14 RX ADMIN — Medication 10 MILLIGRAM(S): at 05:59

## 2024-06-14 RX ADMIN — RANOLAZINE 500 MILLIGRAM(S): 500 TABLET, EXTENDED RELEASE ORAL at 05:58

## 2024-06-14 RX ADMIN — Medication 1000 MILLIGRAM(S): at 05:59

## 2024-06-14 RX ADMIN — LORATADINE 10 MILLIGRAM(S): 10 TABLET ORAL at 13:54

## 2024-06-14 RX ADMIN — RANOLAZINE 500 MILLIGRAM(S): 500 TABLET, EXTENDED RELEASE ORAL at 18:52

## 2024-06-14 RX ADMIN — MESALAMINE 250 MILLIGRAM(S): 500 CAPSULE ORAL at 18:52

## 2024-06-14 RX ADMIN — Medication 25 GRAM(S): at 13:58

## 2024-06-14 RX ADMIN — INSULIN GLARGINE 5 UNIT(S): 100 INJECTION, SOLUTION SUBCUTANEOUS at 22:52

## 2024-06-14 RX ADMIN — Medication 800 MILLIGRAM(S): at 13:53

## 2024-06-14 RX ADMIN — OXYCODONE HYDROCHLORIDE 60 MILLIGRAM(S): 100 SOLUTION ORAL at 09:46

## 2024-06-14 RX ADMIN — Medication 2 TABLET(S): at 21:33

## 2024-06-14 RX ADMIN — PANTOPRAZOLE SODIUM 40 MILLIGRAM(S): 40 INJECTION, POWDER, FOR SOLUTION INTRAVENOUS at 05:59

## 2024-06-14 RX ADMIN — MESALAMINE 250 MILLIGRAM(S): 500 CAPSULE ORAL at 13:54

## 2024-06-14 RX ADMIN — Medication 1000 MILLIGRAM(S): at 21:33

## 2024-06-14 RX ADMIN — Medication 5 MILLIGRAM(S): at 21:34

## 2024-06-14 RX ADMIN — MESALAMINE 250 MILLIGRAM(S): 500 CAPSULE ORAL at 05:58

## 2024-06-14 RX ADMIN — DIAZEPAM 5 MILLIGRAM(S): 10 TABLET ORAL at 05:59

## 2024-06-14 RX ADMIN — MESALAMINE 250 MILLIGRAM(S): 500 CAPSULE ORAL at 22:52

## 2024-06-14 RX ADMIN — OXYCODONE HYDROCHLORIDE 60 MILLIGRAM(S): 100 SOLUTION ORAL at 19:29

## 2024-06-14 RX ADMIN — ASPIRIN 81 MILLIGRAM(S): 325 TABLET, FILM COATED ORAL at 13:54

## 2024-06-14 RX ADMIN — Medication 10 MILLIGRAM(S): at 21:34

## 2024-06-14 RX ADMIN — POLYETHYLENE GLYCOL 3350 17 GRAM(S): 1 POWDER ORAL at 18:52

## 2024-06-14 RX ADMIN — Medication 800 MILLIGRAM(S): at 05:59

## 2024-06-14 RX ADMIN — Medication 800 MILLIGRAM(S): at 21:33

## 2024-06-14 RX ADMIN — ENOXAPARIN SODIUM 40 MILLIGRAM(S): 100 INJECTION SUBCUTANEOUS at 18:53

## 2024-06-14 RX ADMIN — FOSCARNET SODIUM 500 MILLIGRAM(S): 24 INJECTION, SOLUTION INTRAVENOUS at 06:00

## 2024-06-14 RX ADMIN — Medication 10 MILLIGRAM(S): at 13:54

## 2024-06-14 RX ADMIN — Medication 1000 MILLIGRAM(S): at 06:59

## 2024-06-14 RX ADMIN — Medication 1000 MILLIGRAM(S): at 13:55

## 2024-06-14 RX ADMIN — DIAZEPAM 5 MILLIGRAM(S): 10 TABLET ORAL at 21:33

## 2024-06-15 LAB
ANION GAP SERPL CALC-SCNC: 10 MMOL/L — SIGNIFICANT CHANGE UP (ref 5–17)
BUN SERPL-MCNC: 8 MG/DL — SIGNIFICANT CHANGE UP (ref 7–23)
CALCIUM SERPL-MCNC: 9.5 MG/DL — SIGNIFICANT CHANGE UP (ref 8.4–10.5)
CHLORIDE SERPL-SCNC: 99 MMOL/L — SIGNIFICANT CHANGE UP (ref 96–108)
CO2 SERPL-SCNC: 34 MMOL/L — HIGH (ref 22–31)
CREAT SERPL-MCNC: 0.61 MG/DL — SIGNIFICANT CHANGE UP (ref 0.5–1.3)
EGFR: 112 ML/MIN/1.73M2 — SIGNIFICANT CHANGE UP
GLUCOSE BLDC GLUCOMTR-MCNC: 120 MG/DL — HIGH (ref 70–99)
GLUCOSE BLDC GLUCOMTR-MCNC: 141 MG/DL — HIGH (ref 70–99)
GLUCOSE BLDC GLUCOMTR-MCNC: 204 MG/DL — HIGH (ref 70–99)
GLUCOSE BLDC GLUCOMTR-MCNC: 94 MG/DL — SIGNIFICANT CHANGE UP (ref 70–99)
GLUCOSE SERPL-MCNC: 136 MG/DL — HIGH (ref 70–99)
HCT VFR BLD CALC: 37.3 % — LOW (ref 39–50)
HGB BLD-MCNC: 11.8 G/DL — LOW (ref 13–17)
MAGNESIUM SERPL-MCNC: 1.6 MG/DL — SIGNIFICANT CHANGE UP (ref 1.6–2.6)
MCHC RBC-ENTMCNC: 28.6 PG — SIGNIFICANT CHANGE UP (ref 27–34)
MCHC RBC-ENTMCNC: 31.6 GM/DL — LOW (ref 32–36)
MCV RBC AUTO: 90.3 FL — SIGNIFICANT CHANGE UP (ref 80–100)
NRBC # BLD: 0 /100 WBCS — SIGNIFICANT CHANGE UP (ref 0–0)
PHOSPHATE SERPL-MCNC: 3.3 MG/DL — SIGNIFICANT CHANGE UP (ref 2.5–4.5)
PLATELET # BLD AUTO: 69 K/UL — LOW (ref 150–400)
POTASSIUM SERPL-MCNC: 4.2 MMOL/L — SIGNIFICANT CHANGE UP (ref 3.5–5.3)
POTASSIUM SERPL-SCNC: 4.2 MMOL/L — SIGNIFICANT CHANGE UP (ref 3.5–5.3)
RBC # BLD: 4.13 M/UL — LOW (ref 4.2–5.8)
RBC # FLD: 14.6 % — HIGH (ref 10.3–14.5)
SODIUM SERPL-SCNC: 143 MMOL/L — SIGNIFICANT CHANGE UP (ref 135–145)
WBC # BLD: 5.22 K/UL — SIGNIFICANT CHANGE UP (ref 3.8–10.5)
WBC # FLD AUTO: 5.22 K/UL — SIGNIFICANT CHANGE UP (ref 3.8–10.5)

## 2024-06-15 PROCEDURE — 99223 1ST HOSP IP/OBS HIGH 75: CPT

## 2024-06-15 PROCEDURE — 71045 X-RAY EXAM CHEST 1 VIEW: CPT | Mod: 26

## 2024-06-15 RX ADMIN — MESALAMINE 250 MILLIGRAM(S): 500 CAPSULE ORAL at 11:36

## 2024-06-15 RX ADMIN — INSULIN LISPRO 3 UNIT(S): 100 INJECTION, SOLUTION SUBCUTANEOUS at 07:48

## 2024-06-15 RX ADMIN — MESALAMINE 250 MILLIGRAM(S): 500 CAPSULE ORAL at 23:46

## 2024-06-15 RX ADMIN — LORATADINE 10 MILLIGRAM(S): 10 TABLET ORAL at 11:35

## 2024-06-15 RX ADMIN — Medication 1000 MILLIGRAM(S): at 14:29

## 2024-06-15 RX ADMIN — ISOSORBIDE DINITRATE 30 MILLIGRAM(S): 5 TABLET ORAL at 09:39

## 2024-06-15 RX ADMIN — MESALAMINE 250 MILLIGRAM(S): 500 CAPSULE ORAL at 06:43

## 2024-06-15 RX ADMIN — DIAZEPAM 5 MILLIGRAM(S): 10 TABLET ORAL at 06:43

## 2024-06-15 RX ADMIN — FOSCARNET SODIUM 500 MILLIGRAM(S): 24 INJECTION, SOLUTION INTRAVENOUS at 06:53

## 2024-06-15 RX ADMIN — RANOLAZINE 500 MILLIGRAM(S): 500 TABLET, EXTENDED RELEASE ORAL at 18:26

## 2024-06-15 RX ADMIN — Medication 10 MILLIGRAM(S): at 06:43

## 2024-06-15 RX ADMIN — INSULIN LISPRO 4: 100 INJECTION, SOLUTION SUBCUTANEOUS at 18:25

## 2024-06-15 RX ADMIN — ENOXAPARIN SODIUM 40 MILLIGRAM(S): 100 INJECTION SUBCUTANEOUS at 06:44

## 2024-06-15 RX ADMIN — POLYETHYLENE GLYCOL 3350 17 GRAM(S): 1 POWDER ORAL at 18:24

## 2024-06-15 RX ADMIN — MESALAMINE 250 MILLIGRAM(S): 500 CAPSULE ORAL at 18:26

## 2024-06-15 RX ADMIN — Medication 50 MILLIGRAM(S): at 17:26

## 2024-06-15 RX ADMIN — ASPIRIN 81 MILLIGRAM(S): 325 TABLET, FILM COATED ORAL at 11:36

## 2024-06-15 RX ADMIN — Medication 800 MILLIGRAM(S): at 06:42

## 2024-06-15 RX ADMIN — INSULIN LISPRO 3 UNIT(S): 100 INJECTION, SOLUTION SUBCUTANEOUS at 11:36

## 2024-06-15 RX ADMIN — FOSCARNET SODIUM 500 MILLIGRAM(S): 24 INJECTION, SOLUTION INTRAVENOUS at 14:29

## 2024-06-15 RX ADMIN — Medication 50 MILLIGRAM(S): at 06:43

## 2024-06-15 RX ADMIN — Medication 10 MILLIGRAM(S): at 14:29

## 2024-06-15 RX ADMIN — INSULIN LISPRO 3 UNIT(S): 100 INJECTION, SOLUTION SUBCUTANEOUS at 18:25

## 2024-06-15 RX ADMIN — Medication 1000 MILLIGRAM(S): at 23:36

## 2024-06-15 RX ADMIN — RANOLAZINE 500 MILLIGRAM(S): 500 TABLET, EXTENDED RELEASE ORAL at 06:43

## 2024-06-15 RX ADMIN — ENOXAPARIN SODIUM 40 MILLIGRAM(S): 100 INJECTION SUBCUTANEOUS at 18:24

## 2024-06-15 RX ADMIN — PANTOPRAZOLE SODIUM 40 MILLIGRAM(S): 40 INJECTION, POWDER, FOR SOLUTION INTRAVENOUS at 06:43

## 2024-06-15 RX ADMIN — Medication 1000 MILLIGRAM(S): at 06:43

## 2024-06-16 LAB
ANION GAP SERPL CALC-SCNC: 9 MMOL/L — SIGNIFICANT CHANGE UP (ref 5–17)
BUN SERPL-MCNC: 6 MG/DL — LOW (ref 7–23)
CALCIUM SERPL-MCNC: 9.4 MG/DL — SIGNIFICANT CHANGE UP (ref 8.4–10.5)
CHLORIDE SERPL-SCNC: 99 MMOL/L — SIGNIFICANT CHANGE UP (ref 96–108)
CO2 SERPL-SCNC: 31 MMOL/L — SIGNIFICANT CHANGE UP (ref 22–31)
CREAT SERPL-MCNC: 0.52 MG/DL — SIGNIFICANT CHANGE UP (ref 0.5–1.3)
EGFR: 118 ML/MIN/1.73M2 — SIGNIFICANT CHANGE UP
GLUCOSE BLDC GLUCOMTR-MCNC: 129 MG/DL — HIGH (ref 70–99)
GLUCOSE BLDC GLUCOMTR-MCNC: 140 MG/DL — HIGH (ref 70–99)
GLUCOSE BLDC GLUCOMTR-MCNC: 147 MG/DL — HIGH (ref 70–99)
GLUCOSE BLDC GLUCOMTR-MCNC: 160 MG/DL — HIGH (ref 70–99)
GLUCOSE SERPL-MCNC: 154 MG/DL — HIGH (ref 70–99)
HCT VFR BLD CALC: 35.1 % — LOW (ref 39–50)
HGB BLD-MCNC: 11.5 G/DL — LOW (ref 13–17)
MCHC RBC-ENTMCNC: 29.2 PG — SIGNIFICANT CHANGE UP (ref 27–34)
MCHC RBC-ENTMCNC: 32.8 GM/DL — SIGNIFICANT CHANGE UP (ref 32–36)
MCV RBC AUTO: 89.1 FL — SIGNIFICANT CHANGE UP (ref 80–100)
NRBC # BLD: 0 /100 WBCS — SIGNIFICANT CHANGE UP (ref 0–0)
PLATELET # BLD AUTO: 64 K/UL — LOW (ref 150–400)
POTASSIUM SERPL-MCNC: 3.9 MMOL/L — SIGNIFICANT CHANGE UP (ref 3.5–5.3)
POTASSIUM SERPL-SCNC: 3.9 MMOL/L — SIGNIFICANT CHANGE UP (ref 3.5–5.3)
RBC # BLD: 3.94 M/UL — LOW (ref 4.2–5.8)
RBC # FLD: 14.6 % — HIGH (ref 10.3–14.5)
SODIUM SERPL-SCNC: 139 MMOL/L — SIGNIFICANT CHANGE UP (ref 135–145)
WBC # BLD: 6.72 K/UL — SIGNIFICANT CHANGE UP (ref 3.8–10.5)
WBC # FLD AUTO: 6.72 K/UL — SIGNIFICANT CHANGE UP (ref 3.8–10.5)

## 2024-06-16 PROCEDURE — 99233 SBSQ HOSP IP/OBS HIGH 50: CPT

## 2024-06-16 RX ORDER — POTASSIUM CHLORIDE 600 MG/1
20 TABLET, FILM COATED, EXTENDED RELEASE ORAL ONCE
Refills: 0 | Status: COMPLETED | OUTPATIENT
Start: 2024-06-16 | End: 2024-06-16

## 2024-06-16 RX ADMIN — MESALAMINE 250 MILLIGRAM(S): 500 CAPSULE ORAL at 06:02

## 2024-06-16 RX ADMIN — FOSCARNET SODIUM 500 MILLIGRAM(S): 24 INJECTION, SOLUTION INTRAVENOUS at 15:01

## 2024-06-16 RX ADMIN — POLYETHYLENE GLYCOL 3350 17 GRAM(S): 1 POWDER ORAL at 06:02

## 2024-06-16 RX ADMIN — Medication 5 MILLIGRAM(S): at 23:01

## 2024-06-16 RX ADMIN — INSULIN GLARGINE 5 UNIT(S): 100 INJECTION, SOLUTION SUBCUTANEOUS at 23:02

## 2024-06-16 RX ADMIN — Medication 10 MILLIGRAM(S): at 23:01

## 2024-06-16 RX ADMIN — OXYCODONE HYDROCHLORIDE 60 MILLIGRAM(S): 100 SOLUTION ORAL at 10:04

## 2024-06-16 RX ADMIN — PANTOPRAZOLE SODIUM 40 MILLIGRAM(S): 40 INJECTION, POWDER, FOR SOLUTION INTRAVENOUS at 06:03

## 2024-06-16 RX ADMIN — Medication 800 MILLIGRAM(S): at 23:00

## 2024-06-16 RX ADMIN — POLYETHYLENE GLYCOL 3350 17 GRAM(S): 1 POWDER ORAL at 18:53

## 2024-06-16 RX ADMIN — DIAZEPAM 5 MILLIGRAM(S): 10 TABLET ORAL at 15:01

## 2024-06-16 RX ADMIN — LORATADINE 10 MILLIGRAM(S): 10 TABLET ORAL at 13:02

## 2024-06-16 RX ADMIN — Medication 10 MILLIGRAM(S): at 06:03

## 2024-06-16 RX ADMIN — Medication 1000 MILLIGRAM(S): at 23:01

## 2024-06-16 RX ADMIN — Medication 1000 MILLIGRAM(S): at 06:03

## 2024-06-16 RX ADMIN — MESALAMINE 250 MILLIGRAM(S): 500 CAPSULE ORAL at 23:01

## 2024-06-16 RX ADMIN — Medication 50 MILLIGRAM(S): at 18:53

## 2024-06-16 RX ADMIN — FOSCARNET SODIUM 500 MILLIGRAM(S): 24 INJECTION, SOLUTION INTRAVENOUS at 23:02

## 2024-06-16 RX ADMIN — RANOLAZINE 500 MILLIGRAM(S): 500 TABLET, EXTENDED RELEASE ORAL at 06:03

## 2024-06-16 RX ADMIN — ENOXAPARIN SODIUM 40 MILLIGRAM(S): 100 INJECTION SUBCUTANEOUS at 18:53

## 2024-06-16 RX ADMIN — MESALAMINE 250 MILLIGRAM(S): 500 CAPSULE ORAL at 18:53

## 2024-06-16 RX ADMIN — FOSCARNET SODIUM 500 MILLIGRAM(S): 24 INJECTION, SOLUTION INTRAVENOUS at 06:04

## 2024-06-16 RX ADMIN — POTASSIUM CHLORIDE 20 MILLIEQUIVALENT(S): 600 TABLET, FILM COATED, EXTENDED RELEASE ORAL at 13:23

## 2024-06-16 RX ADMIN — ATORVASTATIN CALCIUM 40 MILLIGRAM(S): 20 TABLET, FILM COATED ORAL at 23:00

## 2024-06-16 RX ADMIN — INSULIN LISPRO 3 UNIT(S): 100 INJECTION, SOLUTION SUBCUTANEOUS at 06:52

## 2024-06-16 RX ADMIN — RANOLAZINE 500 MILLIGRAM(S): 500 TABLET, EXTENDED RELEASE ORAL at 18:53

## 2024-06-16 RX ADMIN — INSULIN LISPRO 3 UNIT(S): 100 INJECTION, SOLUTION SUBCUTANEOUS at 19:05

## 2024-06-16 RX ADMIN — Medication 1000 MILLIGRAM(S): at 15:02

## 2024-06-16 RX ADMIN — Medication 10 MILLIGRAM(S): at 15:03

## 2024-06-16 RX ADMIN — Medication 800 MILLIGRAM(S): at 06:03

## 2024-06-16 RX ADMIN — ENOXAPARIN SODIUM 40 MILLIGRAM(S): 100 INJECTION SUBCUTANEOUS at 06:02

## 2024-06-16 RX ADMIN — ONDANSETRON HYDROCHLORIDE 4 MILLIGRAM(S): 2 INJECTION INTRAMUSCULAR; INTRAVENOUS at 18:53

## 2024-06-16 RX ADMIN — Medication 50 MILLIGRAM(S): at 06:03

## 2024-06-16 RX ADMIN — DIAZEPAM 5 MILLIGRAM(S): 10 TABLET ORAL at 06:03

## 2024-06-16 RX ADMIN — ONDANSETRON HYDROCHLORIDE 4 MILLIGRAM(S): 2 INJECTION INTRAMUSCULAR; INTRAVENOUS at 23:01

## 2024-06-16 RX ADMIN — Medication 1000 MILLIGRAM(S): at 07:03

## 2024-06-16 RX ADMIN — Medication 2 TABLET(S): at 23:02

## 2024-06-16 RX ADMIN — INSULIN LISPRO 2: 100 INJECTION, SOLUTION SUBCUTANEOUS at 19:04

## 2024-06-16 RX ADMIN — Medication 800 MILLIGRAM(S): at 15:02

## 2024-06-16 RX ADMIN — ONDANSETRON HYDROCHLORIDE 4 MILLIGRAM(S): 2 INJECTION INTRAMUSCULAR; INTRAVENOUS at 13:02

## 2024-06-16 RX ADMIN — ASPIRIN 81 MILLIGRAM(S): 325 TABLET, FILM COATED ORAL at 13:02

## 2024-06-16 RX ADMIN — ISOSORBIDE DINITRATE 30 MILLIGRAM(S): 5 TABLET ORAL at 10:04

## 2024-06-16 RX ADMIN — MESALAMINE 250 MILLIGRAM(S): 500 CAPSULE ORAL at 13:04

## 2024-06-17 DIAGNOSIS — M79.89 OTHER SPECIFIED SOFT TISSUE DISORDERS: ICD-10-CM

## 2024-06-17 LAB
ANION GAP SERPL CALC-SCNC: 7 MMOL/L — SIGNIFICANT CHANGE UP (ref 5–17)
ANION GAP SERPL CALC-SCNC: 9 MMOL/L — SIGNIFICANT CHANGE UP (ref 5–17)
BUN SERPL-MCNC: 10 MG/DL — SIGNIFICANT CHANGE UP (ref 7–23)
BUN SERPL-MCNC: 9 MG/DL — SIGNIFICANT CHANGE UP (ref 7–23)
CALCIUM SERPL-MCNC: 9.4 MG/DL — SIGNIFICANT CHANGE UP (ref 8.4–10.5)
CALCIUM SERPL-MCNC: 9.4 MG/DL — SIGNIFICANT CHANGE UP (ref 8.4–10.5)
CHLORIDE SERPL-SCNC: 96 MMOL/L — SIGNIFICANT CHANGE UP (ref 96–108)
CHLORIDE SERPL-SCNC: 98 MMOL/L — SIGNIFICANT CHANGE UP (ref 96–108)
CO2 SERPL-SCNC: 27 MMOL/L — SIGNIFICANT CHANGE UP (ref 22–31)
CO2 SERPL-SCNC: 34 MMOL/L — HIGH (ref 22–31)
CREAT SERPL-MCNC: 0.43 MG/DL — LOW (ref 0.5–1.3)
CREAT SERPL-MCNC: 0.46 MG/DL — LOW (ref 0.5–1.3)
EGFR: 122 ML/MIN/1.73M2 — SIGNIFICANT CHANGE UP
EGFR: 125 ML/MIN/1.73M2 — SIGNIFICANT CHANGE UP
GLUCOSE BLDC GLUCOMTR-MCNC: 130 MG/DL — HIGH (ref 70–99)
GLUCOSE BLDC GLUCOMTR-MCNC: 136 MG/DL — HIGH (ref 70–99)
GLUCOSE BLDC GLUCOMTR-MCNC: 161 MG/DL — HIGH (ref 70–99)
GLUCOSE BLDC GLUCOMTR-MCNC: 185 MG/DL — HIGH (ref 70–99)
GLUCOSE SERPL-MCNC: 133 MG/DL — HIGH (ref 70–99)
GLUCOSE SERPL-MCNC: 150 MG/DL — HIGH (ref 70–99)
HCT VFR BLD CALC: 32.8 % — LOW (ref 39–50)
HCT VFR BLD CALC: 35.9 % — LOW (ref 39–50)
HGB BLD-MCNC: 10.6 G/DL — LOW (ref 13–17)
HGB BLD-MCNC: 11.2 G/DL — LOW (ref 13–17)
MAGNESIUM SERPL-MCNC: 1.3 MG/DL — LOW (ref 1.6–2.6)
MCHC RBC-ENTMCNC: 28.6 PG — SIGNIFICANT CHANGE UP (ref 27–34)
MCHC RBC-ENTMCNC: 28.9 PG — SIGNIFICANT CHANGE UP (ref 27–34)
MCHC RBC-ENTMCNC: 31.2 GM/DL — LOW (ref 32–36)
MCHC RBC-ENTMCNC: 32.3 GM/DL — SIGNIFICANT CHANGE UP (ref 32–36)
MCV RBC AUTO: 88.6 FL — SIGNIFICANT CHANGE UP (ref 80–100)
MCV RBC AUTO: 92.5 FL — SIGNIFICANT CHANGE UP (ref 80–100)
NRBC # BLD: 0 /100 WBCS — SIGNIFICANT CHANGE UP (ref 0–0)
NRBC # BLD: 0 /100 WBCS — SIGNIFICANT CHANGE UP (ref 0–0)
PHOSPHATE SERPL-MCNC: 3.7 MG/DL — SIGNIFICANT CHANGE UP (ref 2.5–4.5)
PLATELET # BLD AUTO: 39 K/UL — LOW (ref 150–400)
PLATELET # BLD AUTO: 76 K/UL — LOW (ref 150–400)
POTASSIUM SERPL-MCNC: 3.7 MMOL/L — SIGNIFICANT CHANGE UP (ref 3.5–5.3)
POTASSIUM SERPL-MCNC: 4.5 MMOL/L — SIGNIFICANT CHANGE UP (ref 3.5–5.3)
POTASSIUM SERPL-SCNC: 3.7 MMOL/L — SIGNIFICANT CHANGE UP (ref 3.5–5.3)
POTASSIUM SERPL-SCNC: 4.5 MMOL/L — SIGNIFICANT CHANGE UP (ref 3.5–5.3)
RBC # BLD: 3.7 M/UL — LOW (ref 4.2–5.8)
RBC # BLD: 3.88 M/UL — LOW (ref 4.2–5.8)
RBC # FLD: 14.5 % — SIGNIFICANT CHANGE UP (ref 10.3–14.5)
RBC # FLD: 14.6 % — HIGH (ref 10.3–14.5)
SODIUM SERPL-SCNC: 134 MMOL/L — LOW (ref 135–145)
SODIUM SERPL-SCNC: 137 MMOL/L — SIGNIFICANT CHANGE UP (ref 135–145)
WBC # BLD: 4.64 K/UL — SIGNIFICANT CHANGE UP (ref 3.8–10.5)
WBC # BLD: 5.12 K/UL — SIGNIFICANT CHANGE UP (ref 3.8–10.5)
WBC # FLD AUTO: 4.64 K/UL — SIGNIFICANT CHANGE UP (ref 3.8–10.5)
WBC # FLD AUTO: 5.12 K/UL — SIGNIFICANT CHANGE UP (ref 3.8–10.5)

## 2024-06-17 PROCEDURE — 93971 EXTREMITY STUDY: CPT | Mod: 26,LT

## 2024-06-17 PROCEDURE — 99232 SBSQ HOSP IP/OBS MODERATE 35: CPT

## 2024-06-17 RX ORDER — NALOXEGOL OXALATE 25 MG/1
25 TABLET, FILM COATED ORAL ONCE
Refills: 0 | Status: COMPLETED | OUTPATIENT
Start: 2024-06-17 | End: 2024-06-17

## 2024-06-17 RX ORDER — POTASSIUM CHLORIDE 600 MG/1
40 TABLET, FILM COATED, EXTENDED RELEASE ORAL ONCE
Refills: 0 | Status: COMPLETED | OUTPATIENT
Start: 2024-06-17 | End: 2024-06-17

## 2024-06-17 RX ORDER — MAGNESIUM SULFATE 100 %
2 POWDER (GRAM) MISCELLANEOUS
Refills: 0 | Status: COMPLETED | OUTPATIENT
Start: 2024-06-17 | End: 2024-06-17

## 2024-06-17 RX ORDER — FUROSEMIDE 10 MG/ML
20 INJECTION, SOLUTION INTRAMUSCULAR; INTRAVENOUS ONCE
Refills: 0 | Status: COMPLETED | OUTPATIENT
Start: 2024-06-17 | End: 2024-06-17

## 2024-06-17 RX ORDER — OXYCODONE HYDROCHLORIDE 100 MG/5ML
60 SOLUTION ORAL
Refills: 0 | Status: DISCONTINUED | OUTPATIENT
Start: 2024-06-17 | End: 2024-06-19

## 2024-06-17 RX ORDER — DIAZEPAM 10 MG/1
5 TABLET ORAL EVERY 8 HOURS
Refills: 0 | Status: DISCONTINUED | OUTPATIENT
Start: 2024-06-17 | End: 2024-06-19

## 2024-06-17 RX ADMIN — Medication 800 MILLIGRAM(S): at 07:12

## 2024-06-17 RX ADMIN — RANOLAZINE 500 MILLIGRAM(S): 500 TABLET, EXTENDED RELEASE ORAL at 07:12

## 2024-06-17 RX ADMIN — INSULIN GLARGINE 5 UNIT(S): 100 INJECTION, SOLUTION SUBCUTANEOUS at 22:18

## 2024-06-17 RX ADMIN — DIAZEPAM 5 MILLIGRAM(S): 10 TABLET ORAL at 23:58

## 2024-06-17 RX ADMIN — Medication 5 MILLIGRAM(S): at 23:57

## 2024-06-17 RX ADMIN — INSULIN LISPRO 3 UNIT(S): 100 INJECTION, SOLUTION SUBCUTANEOUS at 18:29

## 2024-06-17 RX ADMIN — Medication 10 MILLIGRAM(S): at 23:57

## 2024-06-17 RX ADMIN — Medication 25 GRAM(S): at 18:34

## 2024-06-17 RX ADMIN — DIAZEPAM 5 MILLIGRAM(S): 10 TABLET ORAL at 14:38

## 2024-06-17 RX ADMIN — PANTOPRAZOLE SODIUM 40 MILLIGRAM(S): 40 INJECTION, POWDER, FOR SOLUTION INTRAVENOUS at 07:12

## 2024-06-17 RX ADMIN — FOSCARNET SODIUM 500 MILLIGRAM(S): 24 INJECTION, SOLUTION INTRAVENOUS at 14:39

## 2024-06-17 RX ADMIN — Medication 800 MILLIGRAM(S): at 23:57

## 2024-06-17 RX ADMIN — Medication 1000 MILLIGRAM(S): at 07:13

## 2024-06-17 RX ADMIN — Medication 1000 MILLIGRAM(S): at 14:38

## 2024-06-17 RX ADMIN — INSULIN LISPRO 2: 100 INJECTION, SOLUTION SUBCUTANEOUS at 18:30

## 2024-06-17 RX ADMIN — Medication 25 GRAM(S): at 12:45

## 2024-06-17 RX ADMIN — ENOXAPARIN SODIUM 40 MILLIGRAM(S): 100 INJECTION SUBCUTANEOUS at 07:11

## 2024-06-17 RX ADMIN — Medication 1000 MILLIGRAM(S): at 00:00

## 2024-06-17 RX ADMIN — FUROSEMIDE 20 MILLIGRAM(S): 10 INJECTION, SOLUTION INTRAMUSCULAR; INTRAVENOUS at 14:39

## 2024-06-17 RX ADMIN — MESALAMINE 250 MILLIGRAM(S): 500 CAPSULE ORAL at 18:33

## 2024-06-17 RX ADMIN — POLYETHYLENE GLYCOL 3350 17 GRAM(S): 1 POWDER ORAL at 18:33

## 2024-06-17 RX ADMIN — NALOXEGOL OXALATE 25 MILLIGRAM(S): 25 TABLET, FILM COATED ORAL at 23:58

## 2024-06-17 RX ADMIN — Medication 10 MILLIGRAM(S): at 07:12

## 2024-06-17 RX ADMIN — Medication 2 TABLET(S): at 23:57

## 2024-06-17 RX ADMIN — MESALAMINE 250 MILLIGRAM(S): 500 CAPSULE ORAL at 07:12

## 2024-06-17 RX ADMIN — FOSCARNET SODIUM 500 MILLIGRAM(S): 24 INJECTION, SOLUTION INTRAVENOUS at 23:56

## 2024-06-17 RX ADMIN — FOSCARNET SODIUM 500 MILLIGRAM(S): 24 INJECTION, SOLUTION INTRAVENOUS at 07:13

## 2024-06-17 RX ADMIN — ASPIRIN 81 MILLIGRAM(S): 325 TABLET, FILM COATED ORAL at 11:57

## 2024-06-17 RX ADMIN — MESALAMINE 250 MILLIGRAM(S): 500 CAPSULE ORAL at 11:57

## 2024-06-17 RX ADMIN — RANOLAZINE 500 MILLIGRAM(S): 500 TABLET, EXTENDED RELEASE ORAL at 18:34

## 2024-06-17 RX ADMIN — Medication 1000 MILLIGRAM(S): at 23:57

## 2024-06-17 RX ADMIN — INSULIN LISPRO 3 UNIT(S): 100 INJECTION, SOLUTION SUBCUTANEOUS at 09:06

## 2024-06-17 RX ADMIN — Medication 10 MILLIGRAM(S): at 14:38

## 2024-06-17 RX ADMIN — POLYETHYLENE GLYCOL 3350 17 GRAM(S): 1 POWDER ORAL at 07:13

## 2024-06-17 RX ADMIN — POTASSIUM CHLORIDE 40 MILLIEQUIVALENT(S): 600 TABLET, FILM COATED, EXTENDED RELEASE ORAL at 12:45

## 2024-06-17 RX ADMIN — Medication 800 MILLIGRAM(S): at 14:38

## 2024-06-17 RX ADMIN — ATORVASTATIN CALCIUM 40 MILLIGRAM(S): 20 TABLET, FILM COATED ORAL at 23:57

## 2024-06-17 RX ADMIN — INSULIN LISPRO 2: 100 INJECTION, SOLUTION SUBCUTANEOUS at 22:18

## 2024-06-17 RX ADMIN — LORATADINE 10 MILLIGRAM(S): 10 TABLET ORAL at 11:57

## 2024-06-17 RX ADMIN — Medication 50 MILLIGRAM(S): at 18:34

## 2024-06-17 RX ADMIN — ONDANSETRON HYDROCHLORIDE 4 MILLIGRAM(S): 2 INJECTION INTRAMUSCULAR; INTRAVENOUS at 11:57

## 2024-06-17 RX ADMIN — INSULIN LISPRO 3 UNIT(S): 100 INJECTION, SOLUTION SUBCUTANEOUS at 12:45

## 2024-06-17 RX ADMIN — Medication 1000 MILLIGRAM(S): at 08:13

## 2024-06-17 RX ADMIN — ENOXAPARIN SODIUM 40 MILLIGRAM(S): 100 INJECTION SUBCUTANEOUS at 18:33

## 2024-06-17 RX ADMIN — ISOSORBIDE DINITRATE 30 MILLIGRAM(S): 5 TABLET ORAL at 09:24

## 2024-06-18 LAB
ANION GAP SERPL CALC-SCNC: 8 MMOL/L — SIGNIFICANT CHANGE UP (ref 5–17)
BUN SERPL-MCNC: 8 MG/DL — SIGNIFICANT CHANGE UP (ref 7–23)
CALCIUM SERPL-MCNC: 9.4 MG/DL — SIGNIFICANT CHANGE UP (ref 8.4–10.5)
CHLORIDE SERPL-SCNC: 98 MMOL/L — SIGNIFICANT CHANGE UP (ref 96–108)
CO2 SERPL-SCNC: 33 MMOL/L — HIGH (ref 22–31)
CREAT SERPL-MCNC: 0.5 MG/DL — SIGNIFICANT CHANGE UP (ref 0.5–1.3)
EGFR: 119 ML/MIN/1.73M2 — SIGNIFICANT CHANGE UP
GLUCOSE BLDC GLUCOMTR-MCNC: 125 MG/DL — HIGH (ref 70–99)
GLUCOSE BLDC GLUCOMTR-MCNC: 129 MG/DL — HIGH (ref 70–99)
GLUCOSE BLDC GLUCOMTR-MCNC: 168 MG/DL — HIGH (ref 70–99)
GLUCOSE BLDC GLUCOMTR-MCNC: 197 MG/DL — HIGH (ref 70–99)
GLUCOSE SERPL-MCNC: 124 MG/DL — HIGH (ref 70–99)
HCT VFR BLD CALC: 36.3 % — LOW (ref 39–50)
HGB BLD-MCNC: 11.5 G/DL — LOW (ref 13–17)
MAGNESIUM SERPL-MCNC: 1.5 MG/DL — LOW (ref 1.6–2.6)
MCHC RBC-ENTMCNC: 28.3 PG — SIGNIFICANT CHANGE UP (ref 27–34)
MCHC RBC-ENTMCNC: 31.7 GM/DL — LOW (ref 32–36)
MCV RBC AUTO: 89.4 FL — SIGNIFICANT CHANGE UP (ref 80–100)
NRBC # BLD: 0 /100 WBCS — SIGNIFICANT CHANGE UP (ref 0–0)
PHOSPHATE SERPL-MCNC: 2.4 MG/DL — LOW (ref 2.5–4.5)
PLATELET # BLD AUTO: 125 K/UL — LOW (ref 150–400)
POTASSIUM SERPL-MCNC: 4 MMOL/L — SIGNIFICANT CHANGE UP (ref 3.5–5.3)
POTASSIUM SERPL-SCNC: 4 MMOL/L — SIGNIFICANT CHANGE UP (ref 3.5–5.3)
RBC # BLD: 4.06 M/UL — LOW (ref 4.2–5.8)
RBC # FLD: 14.3 % — SIGNIFICANT CHANGE UP (ref 10.3–14.5)
SODIUM SERPL-SCNC: 139 MMOL/L — SIGNIFICANT CHANGE UP (ref 135–145)
WBC # BLD: 5.01 K/UL — SIGNIFICANT CHANGE UP (ref 3.8–10.5)
WBC # FLD AUTO: 5.01 K/UL — SIGNIFICANT CHANGE UP (ref 3.8–10.5)

## 2024-06-18 PROCEDURE — 36000 PLACE NEEDLE IN VEIN: CPT

## 2024-06-18 PROCEDURE — 76937 US GUIDE VASCULAR ACCESS: CPT | Mod: 26

## 2024-06-18 PROCEDURE — 99232 SBSQ HOSP IP/OBS MODERATE 35: CPT

## 2024-06-18 RX ORDER — POTASSIUM CHLORIDE 600 MG/1
40 TABLET, FILM COATED, EXTENDED RELEASE ORAL ONCE
Refills: 0 | Status: COMPLETED | OUTPATIENT
Start: 2024-06-18 | End: 2024-06-18

## 2024-06-18 RX ORDER — MAGNESIUM SULFATE 100 %
2 POWDER (GRAM) MISCELLANEOUS ONCE
Refills: 0 | Status: COMPLETED | OUTPATIENT
Start: 2024-06-18 | End: 2024-06-18

## 2024-06-18 RX ADMIN — POLYETHYLENE GLYCOL 3350 17 GRAM(S): 1 POWDER ORAL at 19:08

## 2024-06-18 RX ADMIN — ONDANSETRON HYDROCHLORIDE 4 MILLIGRAM(S): 2 INJECTION INTRAMUSCULAR; INTRAVENOUS at 18:53

## 2024-06-18 RX ADMIN — INSULIN GLARGINE 5 UNIT(S): 100 INJECTION, SOLUTION SUBCUTANEOUS at 22:31

## 2024-06-18 RX ADMIN — MESALAMINE 250 MILLIGRAM(S): 500 CAPSULE ORAL at 00:12

## 2024-06-18 RX ADMIN — FOSCARNET SODIUM 500 MILLIGRAM(S): 24 INJECTION, SOLUTION INTRAVENOUS at 22:32

## 2024-06-18 RX ADMIN — ASPIRIN 81 MILLIGRAM(S): 325 TABLET, FILM COATED ORAL at 10:32

## 2024-06-18 RX ADMIN — FOSCARNET SODIUM 500 MILLIGRAM(S): 24 INJECTION, SOLUTION INTRAVENOUS at 06:04

## 2024-06-18 RX ADMIN — Medication 10 MILLIGRAM(S): at 14:14

## 2024-06-18 RX ADMIN — RANOLAZINE 500 MILLIGRAM(S): 500 TABLET, EXTENDED RELEASE ORAL at 06:04

## 2024-06-18 RX ADMIN — FOSCARNET SODIUM 500 MILLIGRAM(S): 24 INJECTION, SOLUTION INTRAVENOUS at 14:15

## 2024-06-18 RX ADMIN — MESALAMINE 250 MILLIGRAM(S): 500 CAPSULE ORAL at 10:38

## 2024-06-18 RX ADMIN — INSULIN LISPRO 3 UNIT(S): 100 INJECTION, SOLUTION SUBCUTANEOUS at 07:22

## 2024-06-18 RX ADMIN — INSULIN LISPRO 2: 100 INJECTION, SOLUTION SUBCUTANEOUS at 18:23

## 2024-06-18 RX ADMIN — POTASSIUM CHLORIDE 40 MILLIEQUIVALENT(S): 600 TABLET, FILM COATED, EXTENDED RELEASE ORAL at 14:14

## 2024-06-18 RX ADMIN — LORATADINE 10 MILLIGRAM(S): 10 TABLET ORAL at 10:38

## 2024-06-18 RX ADMIN — RANOLAZINE 500 MILLIGRAM(S): 500 TABLET, EXTENDED RELEASE ORAL at 18:53

## 2024-06-18 RX ADMIN — Medication 800 MILLIGRAM(S): at 06:03

## 2024-06-18 RX ADMIN — INSULIN LISPRO 2: 100 INJECTION, SOLUTION SUBCUTANEOUS at 22:02

## 2024-06-18 RX ADMIN — Medication 10 MILLIGRAM(S): at 06:03

## 2024-06-18 RX ADMIN — ONDANSETRON HYDROCHLORIDE 4 MILLIGRAM(S): 2 INJECTION INTRAMUSCULAR; INTRAVENOUS at 10:37

## 2024-06-18 RX ADMIN — Medication 800 MILLIGRAM(S): at 14:14

## 2024-06-18 RX ADMIN — Medication 1000 MILLIGRAM(S): at 06:04

## 2024-06-18 RX ADMIN — Medication 50 MILLIGRAM(S): at 06:03

## 2024-06-18 RX ADMIN — MESALAMINE 250 MILLIGRAM(S): 500 CAPSULE ORAL at 06:03

## 2024-06-18 RX ADMIN — POLYETHYLENE GLYCOL 3350 17 GRAM(S): 1 POWDER ORAL at 06:04

## 2024-06-18 RX ADMIN — INSULIN LISPRO 3 UNIT(S): 100 INJECTION, SOLUTION SUBCUTANEOUS at 18:23

## 2024-06-18 RX ADMIN — Medication 1000 MILLIGRAM(S): at 14:13

## 2024-06-18 RX ADMIN — DIAZEPAM 5 MILLIGRAM(S): 10 TABLET ORAL at 06:04

## 2024-06-18 RX ADMIN — ENOXAPARIN SODIUM 40 MILLIGRAM(S): 100 INJECTION SUBCUTANEOUS at 18:53

## 2024-06-18 RX ADMIN — ENOXAPARIN SODIUM 40 MILLIGRAM(S): 100 INJECTION SUBCUTANEOUS at 06:04

## 2024-06-18 RX ADMIN — INSULIN LISPRO 3 UNIT(S): 100 INJECTION, SOLUTION SUBCUTANEOUS at 12:45

## 2024-06-18 RX ADMIN — Medication 50 MILLIGRAM(S): at 18:53

## 2024-06-18 RX ADMIN — DIAZEPAM 5 MILLIGRAM(S): 10 TABLET ORAL at 16:39

## 2024-06-18 RX ADMIN — PANTOPRAZOLE SODIUM 40 MILLIGRAM(S): 40 INJECTION, POWDER, FOR SOLUTION INTRAVENOUS at 06:03

## 2024-06-18 RX ADMIN — MESALAMINE 250 MILLIGRAM(S): 500 CAPSULE ORAL at 18:53

## 2024-06-18 RX ADMIN — ONDANSETRON HYDROCHLORIDE 4 MILLIGRAM(S): 2 INJECTION INTRAMUSCULAR; INTRAVENOUS at 06:03

## 2024-06-19 LAB
ANION GAP SERPL CALC-SCNC: 7 MMOL/L — SIGNIFICANT CHANGE UP (ref 5–17)
BASE EXCESS BLDA CALC-SCNC: 9.7 MMOL/L — HIGH (ref -2–3)
BUN SERPL-MCNC: 7 MG/DL — SIGNIFICANT CHANGE UP (ref 7–23)
CALCIUM SERPL-MCNC: 8.9 MG/DL — SIGNIFICANT CHANGE UP (ref 8.4–10.5)
CHLORIDE SERPL-SCNC: 100 MMOL/L — SIGNIFICANT CHANGE UP (ref 96–108)
CO2 BLDA-SCNC: 40 MMOL/L — HIGH (ref 19–24)
CO2 SERPL-SCNC: 32 MMOL/L — HIGH (ref 22–31)
CREAT SERPL-MCNC: 0.49 MG/DL — LOW (ref 0.5–1.3)
EGFR: 120 ML/MIN/1.73M2 — SIGNIFICANT CHANGE UP
GLUCOSE BLDC GLUCOMTR-MCNC: 124 MG/DL — HIGH (ref 70–99)
GLUCOSE BLDC GLUCOMTR-MCNC: 140 MG/DL — HIGH (ref 70–99)
GLUCOSE BLDC GLUCOMTR-MCNC: 159 MG/DL — HIGH (ref 70–99)
GLUCOSE BLDC GLUCOMTR-MCNC: 181 MG/DL — HIGH (ref 70–99)
GLUCOSE SERPL-MCNC: 113 MG/DL — HIGH (ref 70–99)
HCO3 BLDA-SCNC: 38 MMOL/L — HIGH (ref 21–28)
HCT VFR BLD CALC: 35.9 % — LOW (ref 39–50)
HGB BLD-MCNC: 11.2 G/DL — LOW (ref 13–17)
MAGNESIUM SERPL-MCNC: 1.2 MG/DL — LOW (ref 1.6–2.6)
MCHC RBC-ENTMCNC: 28.2 PG — SIGNIFICANT CHANGE UP (ref 27–34)
MCHC RBC-ENTMCNC: 31.2 GM/DL — LOW (ref 32–36)
MCV RBC AUTO: 90.4 FL — SIGNIFICANT CHANGE UP (ref 80–100)
NRBC # BLD: 0 /100 WBCS — SIGNIFICANT CHANGE UP (ref 0–0)
PCO2 BLDA: 65 MMHG — HIGH (ref 35–48)
PH BLDA: 7.37 — SIGNIFICANT CHANGE UP (ref 7.35–7.45)
PHOSPHATE SERPL-MCNC: 3 MG/DL — SIGNIFICANT CHANGE UP (ref 2.5–4.5)
PLATELET # BLD AUTO: 153 K/UL — SIGNIFICANT CHANGE UP (ref 150–400)
PO2 BLDA: <33 MMHG — CRITICAL LOW (ref 83–108)
POTASSIUM SERPL-MCNC: 3.8 MMOL/L — SIGNIFICANT CHANGE UP (ref 3.5–5.3)
POTASSIUM SERPL-SCNC: 3.8 MMOL/L — SIGNIFICANT CHANGE UP (ref 3.5–5.3)
RBC # BLD: 3.97 M/UL — LOW (ref 4.2–5.8)
RBC # FLD: 14.5 % — SIGNIFICANT CHANGE UP (ref 10.3–14.5)
SAO2 % BLDA: 29.5 % — LOW (ref 94–98)
SODIUM SERPL-SCNC: 139 MMOL/L — SIGNIFICANT CHANGE UP (ref 135–145)
VZV DNA, PCR RESULT: NEGATIVE — SIGNIFICANT CHANGE UP
WBC # BLD: 4.34 K/UL — SIGNIFICANT CHANGE UP (ref 3.8–10.5)
WBC # FLD AUTO: 4.34 K/UL — SIGNIFICANT CHANGE UP (ref 3.8–10.5)

## 2024-06-19 PROCEDURE — 99233 SBSQ HOSP IP/OBS HIGH 50: CPT

## 2024-06-19 PROCEDURE — 71045 X-RAY EXAM CHEST 1 VIEW: CPT | Mod: 26

## 2024-06-19 PROCEDURE — 71275 CT ANGIOGRAPHY CHEST: CPT | Mod: 26

## 2024-06-19 RX ORDER — MAGNESIUM SULFATE 100 %
2 POWDER (GRAM) MISCELLANEOUS ONCE
Refills: 0 | Status: COMPLETED | OUTPATIENT
Start: 2024-06-19 | End: 2024-06-19

## 2024-06-19 RX ORDER — DIAZEPAM 10 MG/1
5 TABLET ORAL EVERY 12 HOURS
Refills: 0 | Status: DISCONTINUED | OUTPATIENT
Start: 2024-06-19 | End: 2024-06-20

## 2024-06-19 RX ORDER — DEXTROSE MONOHYDRATE AND SODIUM CHLORIDE 5; .3 G/100ML; G/100ML
500 INJECTION, SOLUTION INTRAVENOUS ONCE
Refills: 0 | Status: COMPLETED | OUTPATIENT
Start: 2024-06-19 | End: 2024-06-19

## 2024-06-19 RX ORDER — OXYCODONE HYDROCHLORIDE 100 MG/5ML
60 SOLUTION ORAL EVERY 4 HOURS
Refills: 0 | Status: DISCONTINUED | OUTPATIENT
Start: 2024-06-19 | End: 2024-06-20

## 2024-06-19 RX ADMIN — LORATADINE 10 MILLIGRAM(S): 10 TABLET ORAL at 11:17

## 2024-06-19 RX ADMIN — ENOXAPARIN SODIUM 40 MILLIGRAM(S): 100 INJECTION SUBCUTANEOUS at 17:11

## 2024-06-19 RX ADMIN — ATORVASTATIN CALCIUM 40 MILLIGRAM(S): 20 TABLET, FILM COATED ORAL at 22:44

## 2024-06-19 RX ADMIN — INSULIN LISPRO 2: 100 INJECTION, SOLUTION SUBCUTANEOUS at 12:49

## 2024-06-19 RX ADMIN — Medication 1000 MILLIGRAM(S): at 05:21

## 2024-06-19 RX ADMIN — Medication 2 TABLET(S): at 22:44

## 2024-06-19 RX ADMIN — Medication 1000 MILLIGRAM(S): at 14:23

## 2024-06-19 RX ADMIN — ONDANSETRON HYDROCHLORIDE 4 MILLIGRAM(S): 2 INJECTION INTRAMUSCULAR; INTRAVENOUS at 17:11

## 2024-06-19 RX ADMIN — PANTOPRAZOLE SODIUM 40 MILLIGRAM(S): 40 INJECTION, POWDER, FOR SOLUTION INTRAVENOUS at 05:24

## 2024-06-19 RX ADMIN — Medication 1000 MILLIGRAM(S): at 22:44

## 2024-06-19 RX ADMIN — Medication 800 MILLIGRAM(S): at 13:49

## 2024-06-19 RX ADMIN — POLYETHYLENE GLYCOL 3350 17 GRAM(S): 1 POWDER ORAL at 17:11

## 2024-06-19 RX ADMIN — DEXTROSE MONOHYDRATE AND SODIUM CHLORIDE 500 MILLILITER(S): 5; .3 INJECTION, SOLUTION INTRAVENOUS at 05:33

## 2024-06-19 RX ADMIN — Medication 1000 MILLIGRAM(S): at 13:49

## 2024-06-19 RX ADMIN — ONDANSETRON HYDROCHLORIDE 4 MILLIGRAM(S): 2 INJECTION INTRAMUSCULAR; INTRAVENOUS at 05:21

## 2024-06-19 RX ADMIN — INSULIN LISPRO 3 UNIT(S): 100 INJECTION, SOLUTION SUBCUTANEOUS at 12:50

## 2024-06-19 RX ADMIN — ASPIRIN 81 MILLIGRAM(S): 325 TABLET, FILM COATED ORAL at 11:17

## 2024-06-19 RX ADMIN — Medication 25 GRAM(S): at 12:52

## 2024-06-19 RX ADMIN — INSULIN LISPRO 2: 100 INJECTION, SOLUTION SUBCUTANEOUS at 17:49

## 2024-06-19 RX ADMIN — MESALAMINE 250 MILLIGRAM(S): 500 CAPSULE ORAL at 17:11

## 2024-06-19 RX ADMIN — POLYETHYLENE GLYCOL 3350 17 GRAM(S): 1 POWDER ORAL at 05:22

## 2024-06-19 RX ADMIN — MESALAMINE 250 MILLIGRAM(S): 500 CAPSULE ORAL at 11:17

## 2024-06-19 RX ADMIN — INSULIN LISPRO 3 UNIT(S): 100 INJECTION, SOLUTION SUBCUTANEOUS at 17:50

## 2024-06-19 RX ADMIN — INSULIN GLARGINE 5 UNIT(S): 100 INJECTION, SOLUTION SUBCUTANEOUS at 22:45

## 2024-06-19 RX ADMIN — DIAZEPAM 5 MILLIGRAM(S): 10 TABLET ORAL at 17:11

## 2024-06-19 RX ADMIN — ONDANSETRON HYDROCHLORIDE 4 MILLIGRAM(S): 2 INJECTION INTRAMUSCULAR; INTRAVENOUS at 11:17

## 2024-06-19 RX ADMIN — Medication 1000 MILLIGRAM(S): at 23:44

## 2024-06-19 RX ADMIN — Medication 5 MILLIGRAM(S): at 22:45

## 2024-06-19 RX ADMIN — INSULIN LISPRO 3 UNIT(S): 100 INJECTION, SOLUTION SUBCUTANEOUS at 06:57

## 2024-06-19 RX ADMIN — RANOLAZINE 500 MILLIGRAM(S): 500 TABLET, EXTENDED RELEASE ORAL at 17:11

## 2024-06-19 RX ADMIN — RANOLAZINE 500 MILLIGRAM(S): 500 TABLET, EXTENDED RELEASE ORAL at 05:21

## 2024-06-19 RX ADMIN — MESALAMINE 250 MILLIGRAM(S): 500 CAPSULE ORAL at 05:21

## 2024-06-19 RX ADMIN — ENOXAPARIN SODIUM 40 MILLIGRAM(S): 100 INJECTION SUBCUTANEOUS at 05:20

## 2024-06-19 RX ADMIN — FOSCARNET SODIUM 500 MILLIGRAM(S): 24 INJECTION, SOLUTION INTRAVENOUS at 06:40

## 2024-06-19 RX ADMIN — FOSCARNET SODIUM 500 MILLIGRAM(S): 24 INJECTION, SOLUTION INTRAVENOUS at 16:07

## 2024-06-19 RX ADMIN — Medication 1000 MILLIGRAM(S): at 06:21

## 2024-06-20 ENCOUNTER — TRANSCRIPTION ENCOUNTER (OUTPATIENT)
Age: 59
End: 2024-06-20

## 2024-06-20 VITALS
SYSTOLIC BLOOD PRESSURE: 130 MMHG | DIASTOLIC BLOOD PRESSURE: 61 MMHG | RESPIRATION RATE: 19 BRPM | OXYGEN SATURATION: 96 % | HEART RATE: 88 BPM

## 2024-06-20 LAB
ADD ON TEST-SPECIMEN IN LAB: SIGNIFICANT CHANGE UP
ANION GAP SERPL CALC-SCNC: 9 MMOL/L — SIGNIFICANT CHANGE UP (ref 5–17)
BUN SERPL-MCNC: 5 MG/DL — LOW (ref 7–23)
CALCIUM SERPL-MCNC: 9.5 MG/DL — SIGNIFICANT CHANGE UP (ref 8.4–10.5)
CHLORIDE SERPL-SCNC: 99 MMOL/L — SIGNIFICANT CHANGE UP (ref 96–108)
CO2 SERPL-SCNC: 31 MMOL/L — SIGNIFICANT CHANGE UP (ref 22–31)
CREAT SERPL-MCNC: 0.52 MG/DL — SIGNIFICANT CHANGE UP (ref 0.5–1.3)
EGFR: 118 ML/MIN/1.73M2 — SIGNIFICANT CHANGE UP
GLUCOSE BLDC GLUCOMTR-MCNC: 147 MG/DL — HIGH (ref 70–99)
GLUCOSE BLDC GLUCOMTR-MCNC: 164 MG/DL — HIGH (ref 70–99)
GLUCOSE SERPL-MCNC: 146 MG/DL — HIGH (ref 70–99)
HCT VFR BLD CALC: 34.2 % — LOW (ref 39–50)
HGB BLD-MCNC: 10.6 G/DL — LOW (ref 13–17)
MAGNESIUM SERPL-MCNC: 1.4 MG/DL — LOW (ref 1.6–2.6)
MCHC RBC-ENTMCNC: 28.4 PG — SIGNIFICANT CHANGE UP (ref 27–34)
MCHC RBC-ENTMCNC: 31 GM/DL — LOW (ref 32–36)
MCV RBC AUTO: 91.7 FL — SIGNIFICANT CHANGE UP (ref 80–100)
NRBC # BLD: 0 /100 WBCS — SIGNIFICANT CHANGE UP (ref 0–0)
PLATELET # BLD AUTO: 170 K/UL — SIGNIFICANT CHANGE UP (ref 150–400)
POTASSIUM SERPL-MCNC: 3.7 MMOL/L — SIGNIFICANT CHANGE UP (ref 3.5–5.3)
POTASSIUM SERPL-SCNC: 3.7 MMOL/L — SIGNIFICANT CHANGE UP (ref 3.5–5.3)
RBC # BLD: 3.73 M/UL — LOW (ref 4.2–5.8)
RBC # FLD: 14.4 % — SIGNIFICANT CHANGE UP (ref 10.3–14.5)
SODIUM SERPL-SCNC: 139 MMOL/L — SIGNIFICANT CHANGE UP (ref 135–145)
WBC # BLD: 4.01 K/UL — SIGNIFICANT CHANGE UP (ref 3.8–10.5)
WBC # FLD AUTO: 4.01 K/UL — SIGNIFICANT CHANGE UP (ref 3.8–10.5)

## 2024-06-20 PROCEDURE — 84443 ASSAY THYROID STIM HORMONE: CPT

## 2024-06-20 PROCEDURE — 85025 COMPLETE CBC W/AUTO DIFF WBC: CPT

## 2024-06-20 PROCEDURE — 72131 CT LUMBAR SPINE W/O DYE: CPT | Mod: MC

## 2024-06-20 PROCEDURE — 83735 ASSAY OF MAGNESIUM: CPT

## 2024-06-20 PROCEDURE — 87529 HSV DNA AMP PROBE: CPT

## 2024-06-20 PROCEDURE — 72128 CT CHEST SPINE W/O DYE: CPT | Mod: MC

## 2024-06-20 PROCEDURE — 97530 THERAPEUTIC ACTIVITIES: CPT

## 2024-06-20 PROCEDURE — 83036 HEMOGLOBIN GLYCOSYLATED A1C: CPT

## 2024-06-20 PROCEDURE — 72158 MRI LUMBAR SPINE W/O & W/DYE: CPT | Mod: MC

## 2024-06-20 PROCEDURE — 97162 PT EVAL MOD COMPLEX 30 MIN: CPT

## 2024-06-20 PROCEDURE — 82803 BLOOD GASES ANY COMBINATION: CPT

## 2024-06-20 PROCEDURE — 85379 FIBRIN DEGRADATION QUANT: CPT

## 2024-06-20 PROCEDURE — 97535 SELF CARE MNGMENT TRAINING: CPT

## 2024-06-20 PROCEDURE — 93971 EXTREMITY STUDY: CPT

## 2024-06-20 PROCEDURE — C1755: CPT

## 2024-06-20 PROCEDURE — 76000 FLUOROSCOPY <1 HR PHYS/QHP: CPT

## 2024-06-20 PROCEDURE — 84100 ASSAY OF PHOSPHORUS: CPT

## 2024-06-20 PROCEDURE — 80076 HEPATIC FUNCTION PANEL: CPT

## 2024-06-20 PROCEDURE — 87798 DETECT AGENT NOS DNA AMP: CPT

## 2024-06-20 PROCEDURE — 97165 OT EVAL LOW COMPLEX 30 MIN: CPT

## 2024-06-20 PROCEDURE — 85027 COMPLETE CBC AUTOMATED: CPT

## 2024-06-20 PROCEDURE — 82550 ASSAY OF CK (CPK): CPT

## 2024-06-20 PROCEDURE — 88300 SURGICAL PATH GROSS: CPT

## 2024-06-20 PROCEDURE — C1820: CPT

## 2024-06-20 PROCEDURE — 87389 HIV-1 AG W/HIV-1&-2 AB AG IA: CPT

## 2024-06-20 PROCEDURE — 80053 COMPREHEN METABOLIC PANEL: CPT

## 2024-06-20 PROCEDURE — 73560 X-RAY EXAM OF KNEE 1 OR 2: CPT

## 2024-06-20 PROCEDURE — C1889: CPT

## 2024-06-20 PROCEDURE — 97110 THERAPEUTIC EXERCISES: CPT

## 2024-06-20 PROCEDURE — 99232 SBSQ HOSP IP/OBS MODERATE 35: CPT

## 2024-06-20 PROCEDURE — 71275 CT ANGIOGRAPHY CHEST: CPT | Mod: MC

## 2024-06-20 PROCEDURE — 85610 PROTHROMBIN TIME: CPT

## 2024-06-20 PROCEDURE — 36415 COLL VENOUS BLD VENIPUNCTURE: CPT

## 2024-06-20 PROCEDURE — 84484 ASSAY OF TROPONIN QUANT: CPT

## 2024-06-20 PROCEDURE — 97116 GAIT TRAINING THERAPY: CPT

## 2024-06-20 PROCEDURE — 72157 MRI CHEST SPINE W/O & W/DYE: CPT | Mod: MC

## 2024-06-20 PROCEDURE — C1778: CPT

## 2024-06-20 PROCEDURE — 84439 ASSAY OF FREE THYROXINE: CPT

## 2024-06-20 PROCEDURE — 74018 RADEX ABDOMEN 1 VIEW: CPT

## 2024-06-20 PROCEDURE — 82962 GLUCOSE BLOOD TEST: CPT

## 2024-06-20 PROCEDURE — C1772: CPT

## 2024-06-20 PROCEDURE — 82553 CREATINE MB FRACTION: CPT

## 2024-06-20 PROCEDURE — 85730 THROMBOPLASTIN TIME PARTIAL: CPT

## 2024-06-20 PROCEDURE — 80048 BASIC METABOLIC PNL TOTAL CA: CPT

## 2024-06-20 PROCEDURE — 71045 X-RAY EXAM CHEST 1 VIEW: CPT

## 2024-06-20 PROCEDURE — A9585: CPT

## 2024-06-20 PROCEDURE — 82784 ASSAY IGA/IGD/IGG/IGM EACH: CPT

## 2024-06-20 RX ORDER — NALOXONE HYDROCHLORIDE 1 MG/ML
0.4 INJECTION PARENTERAL
Qty: 1 | Refills: 0
Start: 2024-06-20

## 2024-06-20 RX ORDER — RANOLAZINE 500 MG/1
1 TABLET, EXTENDED RELEASE ORAL
Qty: 0 | Refills: 0 | DISCHARGE
Start: 2024-06-20

## 2024-06-20 RX ORDER — METOPROLOL TARTRATE 50 MG
1 TABLET ORAL
Qty: 0 | Refills: 0 | DISCHARGE
Start: 2024-06-20

## 2024-06-20 RX ORDER — SENNOSIDES 8.6 MG
2 TABLET ORAL
Qty: 0 | Refills: 0 | DISCHARGE
Start: 2024-06-20

## 2024-06-20 RX ORDER — ATORVASTATIN CALCIUM 20 MG/1
1 TABLET, FILM COATED ORAL
Qty: 0 | Refills: 0 | DISCHARGE
Start: 2024-06-20

## 2024-06-20 RX ORDER — ASPIRIN/CALCIUM CARB/MAGNESIUM 324 MG
1 TABLET ORAL
Qty: 0 | Refills: 0 | DISCHARGE

## 2024-06-20 RX ORDER — POLYETHYLENE GLYCOL 3350 1 G/G
17 POWDER ORAL
Refills: 0 | DISCHARGE

## 2024-06-20 RX ORDER — GABAPENTIN 400 MG/1
1 CAPSULE ORAL
Qty: 0 | Refills: 0 | DISCHARGE

## 2024-06-20 RX ORDER — FUROSEMIDE 10 MG/ML
1 INJECTION, SOLUTION INTRAMUSCULAR; INTRAVENOUS
Refills: 0 | DISCHARGE

## 2024-06-20 RX ORDER — ESOMEPRAZOLE MAGNESIUM 40 MG/1
1 CAPSULE, DELAYED RELEASE ORAL
Qty: 0 | Refills: 0 | DISCHARGE

## 2024-06-20 RX ORDER — ACETAMINOPHEN 325 MG
2 TABLET ORAL
Qty: 0 | Refills: 0 | DISCHARGE
Start: 2024-06-20

## 2024-06-20 RX ORDER — RANOLAZINE 500 MG/1
500 TABLET, EXTENDED RELEASE ORAL
Refills: 0 | DISCHARGE

## 2024-06-20 RX ORDER — MAGNESIUM OXIDE 400 MG/1
1 TABLET ORAL
Qty: 0 | Refills: 0 | DISCHARGE
Start: 2024-06-20

## 2024-06-20 RX ORDER — METFORMIN HYDROCHLORIDE 850 MG/1
1 TABLET, FILM COATED ORAL
Refills: 0 | DISCHARGE

## 2024-06-20 RX ORDER — ENOXAPARIN SODIUM 100 MG/ML
40 INJECTION SUBCUTANEOUS
Qty: 0 | Refills: 0 | DISCHARGE
Start: 2024-06-20

## 2024-06-20 RX ORDER — INSULIN LISPRO 100 [IU]/ML
2 INJECTION, SOLUTION SUBCUTANEOUS
Qty: 0 | Refills: 0 | DISCHARGE
Start: 2024-06-20

## 2024-06-20 RX ORDER — ISOSORBIDE DINITRATE 5 MG/1
1 TABLET ORAL
Qty: 0 | Refills: 0 | DISCHARGE
Start: 2024-06-20

## 2024-06-20 RX ORDER — PANTOPRAZOLE SODIUM 40 MG/10ML
1 INJECTION, POWDER, FOR SOLUTION INTRAVENOUS
Qty: 0 | Refills: 0 | DISCHARGE
Start: 2024-06-20

## 2024-06-20 RX ORDER — ATORVASTATIN CALCIUM 20 MG/1
1 TABLET, FILM COATED ORAL
Refills: 0 | DISCHARGE

## 2024-06-20 RX ORDER — INSULIN GLARGINE 100 [IU]/ML
5 INJECTION, SOLUTION SUBCUTANEOUS
Qty: 0 | Refills: 0 | DISCHARGE
Start: 2024-06-20

## 2024-06-20 RX ORDER — GLIPIZIDE 5 MG
1 TABLET ORAL
Refills: 0 | DISCHARGE

## 2024-06-20 RX ORDER — MAGNESIUM OXIDE 400 MG/1
400 TABLET ORAL
Refills: 0 | Status: DISCONTINUED | OUTPATIENT
Start: 2024-06-20 | End: 2024-06-20

## 2024-06-20 RX ORDER — MESALAMINE 500 MG/1
1 CAPSULE ORAL
Qty: 0 | Refills: 0 | DISCHARGE
Start: 2024-06-20

## 2024-06-20 RX ORDER — POLYETHYLENE GLYCOL 3350 1 G/G
17 POWDER ORAL
Qty: 0 | Refills: 0 | DISCHARGE
Start: 2024-06-20

## 2024-06-20 RX ORDER — MESALAMINE 500 MG/1
4 CAPSULE ORAL
Refills: 0 | DISCHARGE

## 2024-06-20 RX ORDER — GABAPENTIN
2 POWDER (GRAM) MISCELLANEOUS
Qty: 0 | Refills: 0 | DISCHARGE
Start: 2024-06-20

## 2024-06-20 RX ORDER — OXYCODONE HYDROCHLORIDE 100 MG/5ML
2 SOLUTION ORAL
Qty: 0 | Refills: 0 | DISCHARGE
Start: 2024-06-20

## 2024-06-20 RX ORDER — ISOSORBIDE DINITRATE 5 MG/1
1 TABLET ORAL
Refills: 0 | DISCHARGE

## 2024-06-20 RX ORDER — CYCLOBENZAPRINE HYDROCHLORIDE 10 MG/1
1 TABLET, FILM COATED ORAL
Qty: 0 | Refills: 0 | DISCHARGE

## 2024-06-20 RX ORDER — METOPROLOL TARTRATE 50 MG
1 TABLET ORAL
Qty: 0 | Refills: 0 | DISCHARGE

## 2024-06-20 RX ORDER — MAGNESIUM SULFATE 100 %
1 POWDER (GRAM) MISCELLANEOUS ONCE
Refills: 0 | Status: COMPLETED | OUTPATIENT
Start: 2024-06-20 | End: 2024-06-20

## 2024-06-20 RX ORDER — LEVOCETIRIZINE DIHYDROCHLORIDE 5 MG/1
1 TABLET ORAL
Refills: 0 | DISCHARGE

## 2024-06-20 RX ORDER — DIAZEPAM 10 MG/1
1 TABLET ORAL
Qty: 0 | Refills: 0 | DISCHARGE
Start: 2024-06-20

## 2024-06-20 RX ORDER — DICLOFENAC SODIUM 75 MG/1
1 TABLET, DELAYED RELEASE ORAL
Refills: 0 | DISCHARGE

## 2024-06-20 RX ORDER — LORATADINE 10 MG/1
1 TABLET ORAL
Qty: 0 | Refills: 0 | DISCHARGE
Start: 2024-06-20

## 2024-06-20 RX ORDER — INSULIN LISPRO 100 [IU]/ML
5 INJECTION, SOLUTION SUBCUTANEOUS
Qty: 0 | Refills: 0 | DISCHARGE
Start: 2024-06-20

## 2024-06-20 RX ORDER — ASPIRIN 325 MG/1
1 TABLET, FILM COATED ORAL
Qty: 0 | Refills: 0 | DISCHARGE
Start: 2024-06-20

## 2024-06-20 RX ADMIN — ENOXAPARIN SODIUM 40 MILLIGRAM(S): 100 INJECTION SUBCUTANEOUS at 05:40

## 2024-06-20 RX ADMIN — Medication 1000 MILLIGRAM(S): at 05:39

## 2024-06-20 RX ADMIN — POLYETHYLENE GLYCOL 3350 17 GRAM(S): 1 POWDER ORAL at 05:41

## 2024-06-20 RX ADMIN — INSULIN LISPRO 3 UNIT(S): 100 INJECTION, SOLUTION SUBCUTANEOUS at 06:49

## 2024-06-20 RX ADMIN — Medication 800 MILLIGRAM(S): at 13:08

## 2024-06-20 RX ADMIN — MESALAMINE 250 MILLIGRAM(S): 500 CAPSULE ORAL at 12:43

## 2024-06-20 RX ADMIN — PANTOPRAZOLE SODIUM 40 MILLIGRAM(S): 40 INJECTION, POWDER, FOR SOLUTION INTRAVENOUS at 05:39

## 2024-06-20 RX ADMIN — DIAZEPAM 5 MILLIGRAM(S): 10 TABLET ORAL at 06:33

## 2024-06-20 RX ADMIN — Medication 1000 MILLIGRAM(S): at 13:08

## 2024-06-20 RX ADMIN — ISOSORBIDE DINITRATE 30 MILLIGRAM(S): 5 TABLET ORAL at 13:07

## 2024-06-20 RX ADMIN — RANOLAZINE 500 MILLIGRAM(S): 500 TABLET, EXTENDED RELEASE ORAL at 05:40

## 2024-06-20 RX ADMIN — LORATADINE 10 MILLIGRAM(S): 10 TABLET ORAL at 12:43

## 2024-06-20 RX ADMIN — ONDANSETRON HYDROCHLORIDE 4 MILLIGRAM(S): 2 INJECTION INTRAMUSCULAR; INTRAVENOUS at 12:44

## 2024-06-20 RX ADMIN — INSULIN LISPRO 3 UNIT(S): 100 INJECTION, SOLUTION SUBCUTANEOUS at 12:45

## 2024-06-20 RX ADMIN — ASPIRIN 81 MILLIGRAM(S): 325 TABLET, FILM COATED ORAL at 12:44

## 2024-06-20 RX ADMIN — Medication 100 GRAM(S): at 13:07

## 2024-06-20 RX ADMIN — Medication 1000 MILLIGRAM(S): at 06:39

## 2024-06-20 RX ADMIN — ONDANSETRON HYDROCHLORIDE 4 MILLIGRAM(S): 2 INJECTION INTRAMUSCULAR; INTRAVENOUS at 05:40

## 2024-06-20 RX ADMIN — MESALAMINE 250 MILLIGRAM(S): 500 CAPSULE ORAL at 05:39

## 2024-06-20 RX ADMIN — INSULIN LISPRO 2: 100 INJECTION, SOLUTION SUBCUTANEOUS at 12:44

## 2024-06-20 RX ADMIN — Medication 800 MILLIGRAM(S): at 06:34

## 2024-06-26 LAB — MISCELLANEOUS TEST NAME: SIGNIFICANT CHANGE UP

## (undated) DEVICE — MEDTRONIC CATHETER PASSER 38CM

## (undated) DEVICE — STAPLER SKIN PROXIMATE

## (undated) DEVICE — SYR LUER LOK 5CC

## (undated) DEVICE — SUT ETHIBOND EXCEL 2-0 30"

## (undated) DEVICE — REFIL TY PMP SYNCHROMED

## (undated) DEVICE — SUT MONOCRYL 3-0 18" PS-1

## (undated) DEVICE — DRAPE TOP SHEET 53" X 101"

## (undated) DEVICE — SUT VICRYL 0 18" CT-2 (POP-OFF)

## (undated) DEVICE — SYR LUER LOK 10CC

## (undated) DEVICE — SYR LUER LOK 3CC

## (undated) DEVICE — SUT VICRYL 3-0 18" SH UNDYED (POP-OFF)

## (undated) DEVICE — DRAPE SPLIT SHEET 77" X 108"

## (undated) DEVICE — GLV 6.5 PROTEXIS (BLUE)

## (undated) DEVICE — SUT VICRYL 2-0 18" CT-1 UNDYED (POP-OFF)

## (undated) DEVICE — SUT ETHIBOND 2-0 36" SH

## (undated) DEVICE — PREP CHLORAPREP HI-LITE ORANGE 26ML

## (undated) DEVICE — DRAPE IOBAN 33" X 23"

## (undated) DEVICE — SUT VICRYL 0 27" UR-5

## (undated) DEVICE — SUT VICRYL 2-0 27" CT-2 UNDYED

## (undated) DEVICE — MARKING PEN W RULER

## (undated) DEVICE — SUT VICRYL 3-0 18" SH (POP-OFF)

## (undated) DEVICE — DRAPE C ARM C-ARMOUR

## (undated) DEVICE — DRSG DERMABOND 0.7ML

## (undated) DEVICE — WARMING BLANKET LOWER ADULT

## (undated) DEVICE — DRSG BANDAID 0.75X3"

## (undated) DEVICE — DRAPE C ARM 41X74"

## (undated) DEVICE — VENODYNE/SCD SLEEVE CALF MEDIUM

## (undated) DEVICE — DRSG TEGADERM 4X10"

## (undated) DEVICE — DRSG AQUACEL 3.5 X 10"

## (undated) DEVICE — SPECIMEN CONTAINER 4OZ

## (undated) DEVICE — ULTRASOUND PROBE COVER NEOGAURD LATEX FREE

## (undated) DEVICE — SUT ETHIBOND 2-0 30" CT-1

## (undated) DEVICE — SUT SILK 2-0 30" PSL

## (undated) DEVICE — SUT VICRYL PLUS 2-0 18" CT-2 (POP-OFF)

## (undated) DEVICE — GLV 6.5 PROTEXIS (WHITE)

## (undated) DEVICE — DRSG XEROFORM 1 X 8"

## (undated) DEVICE — PACK GENERAL MINOR

## (undated) DEVICE — SUT ETHIBOND 2-0 30" RB-1

## (undated) DEVICE — TRAY ANES SPINAL EPI COMBO W DRUGS

## (undated) DEVICE — SYR CONTROL LUER LOK 10CC

## (undated) DEVICE — SPECIMEN CONTAINER 100ML

## (undated) DEVICE — NEUROSTIMULATOR EXTERNAL WIRELESS WENS

## (undated) DEVICE — SUT VICRYL 3-0 18" PS-1 UNDYED

## (undated) DEVICE — DRAPE 1/2 SHEET 40X57"

## (undated) DEVICE — DRAPE IOBAN 23" X 23"

## (undated) DEVICE — DRAPE TOWEL BLUE 17" X 24"

## (undated) DEVICE — SYR LOR GLASS LUER SLIP 5CC